# Patient Record
Sex: FEMALE | Race: WHITE | NOT HISPANIC OR LATINO | Employment: OTHER | URBAN - NONMETROPOLITAN AREA
[De-identification: names, ages, dates, MRNs, and addresses within clinical notes are randomized per-mention and may not be internally consistent; named-entity substitution may affect disease eponyms.]

---

## 2024-01-28 ENCOUNTER — HOSPITAL ENCOUNTER (EMERGENCY)
Facility: HOSPITAL | Age: 89
Discharge: HOME | End: 2024-01-28
Attending: EMERGENCY MEDICINE
Payer: MEDICARE

## 2024-01-28 ENCOUNTER — APPOINTMENT (OUTPATIENT)
Dept: RADIOLOGY | Facility: HOSPITAL | Age: 89
End: 2024-01-28
Payer: MEDICARE

## 2024-01-28 VITALS
DIASTOLIC BLOOD PRESSURE: 64 MMHG | WEIGHT: 176.37 LBS | BODY MASS INDEX: 34.63 KG/M2 | HEART RATE: 78 BPM | HEIGHT: 60 IN | RESPIRATION RATE: 18 BRPM | OXYGEN SATURATION: 100 % | TEMPERATURE: 96.6 F | SYSTOLIC BLOOD PRESSURE: 121 MMHG

## 2024-01-28 DIAGNOSIS — K52.9 COLITIS: Primary | ICD-10-CM

## 2024-01-28 DIAGNOSIS — N39.0 URINARY TRACT INFECTION WITHOUT HEMATURIA, SITE UNSPECIFIED: ICD-10-CM

## 2024-01-28 LAB
ALBUMIN SERPL BCP-MCNC: 3.9 G/DL (ref 3.4–5)
ALP SERPL-CCNC: 64 U/L (ref 33–136)
ALT SERPL W P-5'-P-CCNC: 8 U/L (ref 7–45)
ANION GAP SERPL CALC-SCNC: 12 MMOL/L (ref 10–20)
APPEARANCE UR: ABNORMAL
AST SERPL W P-5'-P-CCNC: 17 U/L (ref 9–39)
BACTERIA #/AREA URNS AUTO: ABNORMAL /HPF
BASOPHILS # BLD AUTO: 0.08 X10*3/UL (ref 0–0.1)
BASOPHILS NFR BLD AUTO: 1.2 %
BILIRUB SERPL-MCNC: 0.8 MG/DL (ref 0–1.2)
BILIRUB UR STRIP.AUTO-MCNC: NEGATIVE MG/DL
BUN SERPL-MCNC: 20 MG/DL (ref 6–23)
CALCIUM SERPL-MCNC: 10 MG/DL (ref 8.6–10.3)
CHLORIDE SERPL-SCNC: 102 MMOL/L (ref 98–107)
CO2 SERPL-SCNC: 28 MMOL/L (ref 21–32)
COLOR UR: YELLOW
CREAT SERPL-MCNC: 1.11 MG/DL (ref 0.5–1.05)
EGFRCR SERPLBLD CKD-EPI 2021: 48 ML/MIN/1.73M*2
EOSINOPHIL # BLD AUTO: 0.26 X10*3/UL (ref 0–0.4)
EOSINOPHIL NFR BLD AUTO: 3.8 %
ERYTHROCYTE [DISTWIDTH] IN BLOOD BY AUTOMATED COUNT: 14 % (ref 11.5–14.5)
FLUAV RNA RESP QL NAA+PROBE: NOT DETECTED
FLUBV RNA RESP QL NAA+PROBE: NOT DETECTED
GLUCOSE SERPL-MCNC: 153 MG/DL (ref 74–99)
GLUCOSE UR STRIP.AUTO-MCNC: NEGATIVE MG/DL
HCT VFR BLD AUTO: 34.4 % (ref 36–46)
HEMOCCULT SP1 STL QL: POSITIVE
HGB BLD-MCNC: 11.1 G/DL (ref 12–16)
HOLD SPECIMEN: NORMAL
IMM GRANULOCYTES # BLD AUTO: 0.03 X10*3/UL (ref 0–0.5)
IMM GRANULOCYTES NFR BLD AUTO: 0.4 % (ref 0–0.9)
KETONES UR STRIP.AUTO-MCNC: NEGATIVE MG/DL
LACTATE SERPL-SCNC: 1.9 MMOL/L (ref 0.4–2)
LEUKOCYTE ESTERASE UR QL STRIP.AUTO: ABNORMAL
LIPASE SERPL-CCNC: 25 U/L (ref 9–82)
LYMPHOCYTES # BLD AUTO: 1.44 X10*3/UL (ref 0.8–3)
LYMPHOCYTES NFR BLD AUTO: 20.8 %
MCH RBC QN AUTO: 29.8 PG (ref 26–34)
MCHC RBC AUTO-ENTMCNC: 32.3 G/DL (ref 32–36)
MCV RBC AUTO: 93 FL (ref 80–100)
MONOCYTES # BLD AUTO: 0.52 X10*3/UL (ref 0.05–0.8)
MONOCYTES NFR BLD AUTO: 7.5 %
NEUTROPHILS # BLD AUTO: 4.58 X10*3/UL (ref 1.6–5.5)
NEUTROPHILS NFR BLD AUTO: 66.3 %
NITRITE UR QL STRIP.AUTO: POSITIVE
NRBC BLD-RTO: 0 /100 WBCS (ref 0–0)
PH UR STRIP.AUTO: 5 [PH]
PLATELET # BLD AUTO: 260 X10*3/UL (ref 150–450)
POTASSIUM SERPL-SCNC: 3.9 MMOL/L (ref 3.5–5.3)
PROT SERPL-MCNC: 6.8 G/DL (ref 6.4–8.2)
PROT UR STRIP.AUTO-MCNC: NEGATIVE MG/DL
RBC # BLD AUTO: 3.72 X10*6/UL (ref 4–5.2)
RBC # UR STRIP.AUTO: ABNORMAL /UL
RBC #/AREA URNS AUTO: ABNORMAL /HPF
SARS-COV-2 RNA RESP QL NAA+PROBE: NOT DETECTED
SARS-COV-2 RNA RESP QL NAA+PROBE: NOT DETECTED
SODIUM SERPL-SCNC: 138 MMOL/L (ref 136–145)
SP GR UR STRIP.AUTO: 1.01
TRANS CELLS #/AREA UR COMP ASSIST: ABNORMAL /HPF
UROBILINOGEN UR STRIP.AUTO-MCNC: <2 MG/DL
WBC # BLD AUTO: 6.9 X10*3/UL (ref 4.4–11.3)
WBC #/AREA URNS AUTO: >50 /HPF

## 2024-01-28 PROCEDURE — 81001 URINALYSIS AUTO W/SCOPE: CPT | Performed by: EMERGENCY MEDICINE

## 2024-01-28 PROCEDURE — 83690 ASSAY OF LIPASE: CPT | Performed by: EMERGENCY MEDICINE

## 2024-01-28 PROCEDURE — 99284 EMERGENCY DEPT VISIT MOD MDM: CPT | Mod: 25

## 2024-01-28 PROCEDURE — 96361 HYDRATE IV INFUSION ADD-ON: CPT

## 2024-01-28 PROCEDURE — 82270 OCCULT BLOOD FECES: CPT | Performed by: EMERGENCY MEDICINE

## 2024-01-28 PROCEDURE — 96365 THER/PROPH/DIAG IV INF INIT: CPT

## 2024-01-28 PROCEDURE — 36415 COLL VENOUS BLD VENIPUNCTURE: CPT | Performed by: EMERGENCY MEDICINE

## 2024-01-28 PROCEDURE — 80053 COMPREHEN METABOLIC PANEL: CPT | Performed by: EMERGENCY MEDICINE

## 2024-01-28 PROCEDURE — 87086 URINE CULTURE/COLONY COUNT: CPT | Mod: CONLAB | Performed by: EMERGENCY MEDICINE

## 2024-01-28 PROCEDURE — 87637 SARSCOV2&INF A&B&RSV AMP PRB: CPT | Performed by: EMERGENCY MEDICINE

## 2024-01-28 PROCEDURE — 83605 ASSAY OF LACTIC ACID: CPT | Performed by: EMERGENCY MEDICINE

## 2024-01-28 PROCEDURE — 87506 IADNA-DNA/RNA PROBE TQ 6-11: CPT | Mod: CONLAB | Performed by: EMERGENCY MEDICINE

## 2024-01-28 PROCEDURE — 2500000004 HC RX 250 GENERAL PHARMACY W/ HCPCS (ALT 636 FOR OP/ED): Performed by: EMERGENCY MEDICINE

## 2024-01-28 PROCEDURE — 87493 C DIFF AMPLIFIED PROBE: CPT | Mod: 59 | Performed by: EMERGENCY MEDICINE

## 2024-01-28 PROCEDURE — 74176 CT ABD & PELVIS W/O CONTRAST: CPT

## 2024-01-28 PROCEDURE — 99285 EMERGENCY DEPT VISIT HI MDM: CPT | Performed by: EMERGENCY MEDICINE

## 2024-01-28 PROCEDURE — 74176 CT ABD & PELVIS W/O CONTRAST: CPT | Performed by: RADIOLOGY

## 2024-01-28 PROCEDURE — 85025 COMPLETE CBC W/AUTO DIFF WBC: CPT | Performed by: EMERGENCY MEDICINE

## 2024-01-28 RX ORDER — HYDROCHLOROTHIAZIDE 25 MG/1
25 TABLET ORAL DAILY
COMMUNITY

## 2024-01-28 RX ORDER — CIPROFLOXACIN 2 MG/ML
400 INJECTION, SOLUTION INTRAVENOUS ONCE
Status: COMPLETED | OUTPATIENT
Start: 2024-01-28 | End: 2024-01-28

## 2024-01-28 RX ORDER — CIPROFLOXACIN 500 MG/1
250 TABLET ORAL 2 TIMES DAILY
Qty: 10 TABLET | Refills: 0 | Status: SHIPPED | OUTPATIENT
Start: 2024-01-28 | End: 2024-02-07

## 2024-01-28 RX ORDER — SODIUM CHLORIDE 9 MG/ML
100 INJECTION, SOLUTION INTRAVENOUS CONTINUOUS
Status: DISCONTINUED | OUTPATIENT
Start: 2024-01-28 | End: 2024-01-28 | Stop reason: HOSPADM

## 2024-01-28 RX ADMIN — CIPROFLOXACIN 400 MG: 2 INJECTION, SOLUTION INTRAVENOUS at 14:13

## 2024-01-28 RX ADMIN — SODIUM CHLORIDE 100 ML/HR: 9 INJECTION, SOLUTION INTRAVENOUS at 11:47

## 2024-01-28 RX ADMIN — SODIUM CHLORIDE 500 ML: 9 INJECTION, SOLUTION INTRAVENOUS at 11:49

## 2024-01-28 ASSESSMENT — COLUMBIA-SUICIDE SEVERITY RATING SCALE - C-SSRS
6. HAVE YOU EVER DONE ANYTHING, STARTED TO DO ANYTHING, OR PREPARED TO DO ANYTHING TO END YOUR LIFE?: NO
1. IN THE PAST MONTH, HAVE YOU WISHED YOU WERE DEAD OR WISHED YOU COULD GO TO SLEEP AND NOT WAKE UP?: NO
2. HAVE YOU ACTUALLY HAD ANY THOUGHTS OF KILLING YOURSELF?: NO

## 2024-01-28 ASSESSMENT — PAIN - FUNCTIONAL ASSESSMENT: PAIN_FUNCTIONAL_ASSESSMENT: 0-10

## 2024-01-28 ASSESSMENT — PAIN SCALES - GENERAL: PAINLEVEL_OUTOF10: 0 - NO PAIN

## 2024-01-28 NOTE — ED PROVIDER NOTES
[facility]  ED  Provider Note  1/28/2024 10:19 AM  AC06/AC06        History of Present Illness:   Vanessa Adame is a 88 y.o. female presenting to the ED for hypotension and diarrhea, beginning 3 days ago.  The complaint has been intermittent, moderate in severity, and worsened by coughing.  Patient was transferred from the [facility] for low blood pressure of 60 systolic.  On arrival to ED her blood pressure is 129/64.  The patient reports she been having diarrhea 2 or 3 times a day for the past 4 days or so.  She denies any significant abdominal pain but has some mild cramping.  She denies any blood in the stool.  She has no episode earlier last year and had hypokalemia with dehydration.  She feels somewhat lightheaded but denies true vertigo or spinning.  She denies any fever or chills.  She denies any change in medication or diet.      Review of Systems:   Pertinent positives and review of systems as noted above.  Remaining 10 review of systems is negative or noncontributory to today's episode of care.  Review of Systems       --------------------------------------------- PAST HISTORY ---------------------------------------------  Past Medical History:  has no past medical history on file.    Past Surgical History:  has no past surgical history on file.    Social History:      Family History: family history is not on file. Unless otherwise noted, family history is non contributory    Patient's Medications    No medications on file      The patient’s home medications have been reviewed.    Allergies: Patient has no allergy information on record.    -------------------------------------------------- RESULTS -------------------------------------------------  All laboratory and radiology results have been personally reviewed by myself   LABS:  Labs Reviewed   OCCULT BLOOD X1, STOOL - Abnormal       Result Value    Occult Blood, Stool X1 Positive (*)    COMPREHENSIVE METABOLIC  PANEL - Abnormal    Glucose 153 (*)     Sodium 138      Potassium 3.9      Chloride 102      Bicarbonate 28      Anion Gap 12      Urea Nitrogen 20      Creatinine 1.11 (*)     eGFR 48 (*)     Calcium 10.0      Albumin 3.9      Alkaline Phosphatase 64      Total Protein 6.8      AST 17      Bilirubin, Total 0.8      ALT 8     CBC WITH AUTO DIFFERENTIAL - Abnormal    WBC 6.9      nRBC 0.0      RBC 3.72 (*)     Hemoglobin 11.1 (*)     Hematocrit 34.4 (*)     MCV 93      MCH 29.8      MCHC 32.3      RDW 14.0      Platelets 260      Neutrophils % 66.3      Immature Granulocytes %, Automated 0.4      Lymphocytes % 20.8      Monocytes % 7.5      Eosinophils % 3.8      Basophils % 1.2      Neutrophils Absolute 4.58      Immature Granulocytes Absolute, Automated 0.03      Lymphocytes Absolute 1.44      Monocytes Absolute 0.52      Eosinophils Absolute 0.26      Basophils Absolute 0.08     URINALYSIS WITH REFLEX CULTURE AND MICROSCOPIC - Abnormal    Color, Urine Yellow      Appearance, Urine Hazy (*)     Specific Gravity, Urine 1.006      pH, Urine 5.0      Protein, Urine NEGATIVE      Glucose, Urine NEGATIVE      Blood, Urine SMALL (1+) (*)     Ketones, Urine NEGATIVE      Bilirubin, Urine NEGATIVE      Urobilinogen, Urine <2.0      Nitrite, Urine POSITIVE (*)     Leukocyte Esterase, Urine LARGE (3+) (*)    MICROSCOPIC ONLY, URINE - Abnormal    WBC, Urine >50 (*)     RBC, Urine 6-10 (*)     Transitional Epithelial Cells, Urine 1-2 (FEW)      Bacteria, Urine 1+ (*)    LACTATE - Normal    Lactate 1.9      Narrative:     Venipuncture immediately after or during the administration of Metamizole may lead to falsely low results. Testing should be performed immediately  prior to Metamizole dosing.   LIPASE - Normal    Lipase 25      Narrative:     Venipuncture immediately after or during the administration of Metamizole may lead to falsely low results. Testing should be performed immediately prior to Metamizole dosing.    SARS-COV-2 PCR, SCREEN ASYMPTOMATIC - Normal    Coronavirus 2019, PCR Not Detected      Narrative:     This assay has received FDA Emergency Use Authorization (EUA) and is only authorized for the duration of time that circumstances exist to justify the authorization of the emergency use of in vitro diagnostic tests for the detection of SARS-CoV-2 virus and/or diagnosis of COVID-19 infection under section 564(b)(1) of the Act, 21 U.S.C. 360bbb-3(b)(1). This assay is an in vitro diagnostic nucleic acid amplification test for the qualitative detection of SARS-CoV-2 from nasopharyngeal specimens and has been validated for use at [facility]. Negative results do not preclude COVID-19 infections and should not be used as the sole basis for diagnosis, treatment, or other management decisions.     SARS-COV-2 AND INFLUENZA A/B PCR - Normal    Flu A Result Not Detected      Flu B Result Not Detected      Coronavirus 2019, PCR Not Detected      Narrative:     This assay has received FDA Emergency Use Authorization (EUA) and  is only authorized for the duration of time that circumstances exist to justify the authorization of the emergency use of in vitro diagnostic tests for the detection of SARS-CoV-2 virus and/or diagnosis of COVID-19 infection under section 564(b)(1) of the Act, 21 U.S.C. 360bbb-3(b)(1). Testing for SARS-CoV-2 is only recommended for patients who meet current clinical and/or epidemiological criteria as defined by federal, state, or local public health directives. This assay is an in vitro diagnostic nucleic acid amplification test for the qualitative detection of SARS-CoV-2, Influenza A, and Influenza B from nasopharyngeal specimens and has been validated for use at [facility]. Negative results do not preclude COVID-19 infections or Influenza A/B infections, and should not be used as the sole basis for diagnosis, treatment, or other management decisions. If  Influenza A/B and RSV PCR results are negative, testing for Parainfluenza virus, Adenovirus and Metapneumovirus is routinely performed for [facility] pediatric oncology and intensive care inpatients, and is available on other patients by placing an add-on request.    URINE CULTURE   STOOL PATHOGEN PANEL, PCR   C. DIFFICILE, PCR   URINE CULTURE   URINALYSIS WITH REFLEX CULTURE AND MICROSCOPIC    Narrative:     The following orders were created for panel order Urinalysis with Reflex Culture and Microscopic.  Procedure                               Abnormality         Status                     ---------                               -----------         ------                     Urinalysis with Reflex C...[[id number]]  Abnormal            Final result               Extra Urine Gray Tube[[id number]]                                                         Please view results for these tests on the individual orders.   EXTRA URINE GRAY TUBE         RADIOLOGY:  Interpreted by Radiologist.  CT abdomen pelvis wo IV contrast   Final Result   Mild-to-moderate colonic wall thickening and trace pericolonic fat   stranding within the left colon that may reflect an element of   colitis, likely infectious/inflammatory in etiology. There is no   evidence of bowel obstruction. Colonic diverticulosis is evident   within this region without definite CT evidence of focal acute   diverticulitis.        Nonobstructing 3 mm left renal calculus.        Similar appearing presumed hepatic cysts. Additional chronic findings   as above.        MACRO:   None        Signed by: Roger Euceda 1/28/2024 12:24 PM   Dictation workstation:   [id number]          No results found for this or any previous visit (from the past 4464 hour(s)).  ------------------------- NURSING NOTES AND VITALS REVIEWED ---------------------------   The nursing notes within the ED encounter and vital signs as below have been reviewed.   There were no vitals taken for this  visit.  Oxygen Saturation Interpretation: Normal      ---------------------------------------------------PHYSICAL EXAM--------------------------------------  Physical Exam   Constitutional/General: Alert and oriented x3, well appearing, non toxic in NAD  Head: Normocephalic and atraumatic  Eyes: PERRL, EOMI, conjunctiva normal, sclera non icteric  Mouth: Oropharynx clear, handling secretions, no trismus, no asymmetry of the posterior oropharynx or uvular edema  Neck: Supple, full ROM, non tender to palpation in the midline, no stridor, no crepitus, no meningeal signs  Respiratory: Lungs clear to auscultation bilaterally, no wheezes, rales, or rhonchi. Not in respiratory distress  Cardiovascular:  Regular rate. Regular rhythm. No murmurs, gallops, or rubs. 2+ distal pulses  Chest: No chest wall tenderness  GI:  Abdomen Soft, Non tender, Non distended.  +BS. No organomegaly, no palpable masses,  No rebound, guarding, or rigidity.   Musculoskeletal: Moves all extremities x 4. Warm and well perfused, no clubbing, cyanosis, or edema. Capillary refill <3 seconds  Integument: skin warm and dry. No rashes.   Lymphatic: no lymphadenopathy noted  Neurologic: GCS 15, no focal deficits, symmetric strength 5/5 in the upper and lower extremities bilaterally  Psychiatric: Normal Affect    Procedures    ------------------------------ ED COURSE/MEDICAL DECISION MAKING----------------------    Medical Decision Making:   Patient has some subtle evidence of possible colitis in the left colon on CT scan.  She has no fever or chills.  Her white blood cell count is normal.  There is no evidence of ischemia.  She does have a significant urinary tract infection.  Will place the patient on Cipro to cover the possible colitis as well as the urinary tract infection.  She is to follow-up with her primary care doctor in 2 to 3 days for recheck.  I advised return to the ER for worsening symptoms or concerns.  Diagnoses as of 01/28/24 1556    Colitis   Urinary tract infection without hematuria, site unspecified      Counseling:   The emergency provider has spoken with the patient and family member patient and daughter and discussed today’s results, in addition to providing specific details for the plan of care and counseling regarding the diagnosis and prognosis.  Questions are answered at this time and they are agreeable with the plan.      --------------------------------- IMPRESSION AND DISPOSITION ---------------------------------        IMPRESSION  1. Colitis    2. Urinary tract infection without hematuria, site unspecified        DISPOSITION  Disposition: Discharge to nursing home  Patient condition is fair      Billing Provider Critical Care Time: 0 minutes     Emeterio Luna MD  01/28/24 1553       Emeterio Luna MD  01/28/24 1554       Emeterio Luna MD  01/28/24 1555

## 2024-01-28 NOTE — DISCHARGE INSTRUCTIONS
Ciprofloxacin as prescribed twice per day.    Follow-up with your doctor on Thursday or Friday for recheck.    Return for worsening symptoms or concerns.      Continue regular home medications.

## 2024-01-29 LAB

## 2024-01-30 LAB
BACTERIA UR CULT: ABNORMAL
BACTERIA UR CULT: NORMAL
C DIF TOX TCDA+TCDB STL QL NAA+PROBE: NOT DETECTED

## 2024-01-31 ENCOUNTER — TELEPHONE (OUTPATIENT)
Dept: PHARMACY | Facility: HOSPITAL | Age: 89
End: 2024-01-31
Payer: MEDICARE

## 2024-01-31 NOTE — PROGRESS NOTES
EDPD Note: Lab/Chart Reviewed    Reviewed Ms. Vanessa Adame 's chart regarding a contaminated urine culture/result that was taken during their recent emergency room visit. The patient was transferred back to their rehab/LTC facility .Therefore, I have faxed this information to [facility] at fax number [phone] .    No results found for the last 90 days.      No further follow up needed from EDPD Team.     Renan Vega, PharmD

## 2024-09-06 ENCOUNTER — APPOINTMENT (OUTPATIENT)
Dept: RADIOLOGY | Facility: HOSPITAL | Age: 89
End: 2024-09-06
Payer: MEDICARE

## 2024-09-06 ENCOUNTER — APPOINTMENT (OUTPATIENT)
Dept: CARDIOLOGY | Facility: HOSPITAL | Age: 89
End: 2024-09-06
Payer: MEDICARE

## 2024-09-06 ENCOUNTER — HOSPITAL ENCOUNTER (INPATIENT)
Facility: HOSPITAL | Age: 89
LOS: 1 days | Discharge: HOME | End: 2024-09-07
Attending: FAMILY MEDICINE | Admitting: INTERNAL MEDICINE
Payer: MEDICARE

## 2024-09-06 DIAGNOSIS — U07.1 COVID: Primary | ICD-10-CM

## 2024-09-06 DIAGNOSIS — R53.83 LETHARGY: ICD-10-CM

## 2024-09-06 DIAGNOSIS — K52.9 COLITIS: ICD-10-CM

## 2024-09-06 DIAGNOSIS — U07.1 COVID-19 VIRUS INFECTION: ICD-10-CM

## 2024-09-06 LAB
ALBUMIN SERPL BCP-MCNC: 3.8 G/DL (ref 3.4–5)
ALP SERPL-CCNC: 62 U/L (ref 33–136)
ALT SERPL W P-5'-P-CCNC: 10 U/L (ref 7–45)
ANION GAP SERPL CALC-SCNC: 11 MMOL/L (ref 10–20)
APPEARANCE UR: CLEAR
AST SERPL W P-5'-P-CCNC: 20 U/L (ref 9–39)
BASOPHILS # BLD AUTO: 0.03 X10*3/UL (ref 0–0.1)
BASOPHILS NFR BLD AUTO: 0.6 %
BILIRUB SERPL-MCNC: 0.5 MG/DL (ref 0–1.2)
BILIRUB UR STRIP.AUTO-MCNC: NEGATIVE MG/DL
BNP SERPL-MCNC: 73 PG/ML (ref 0–99)
BUN SERPL-MCNC: 20 MG/DL (ref 6–23)
CALCIUM SERPL-MCNC: 9 MG/DL (ref 8.6–10.3)
CARDIAC TROPONIN I PNL SERPL HS: 9 NG/L (ref 0–13)
CHLORIDE SERPL-SCNC: 101 MMOL/L (ref 98–107)
CO2 SERPL-SCNC: 25 MMOL/L (ref 21–32)
COLOR UR: ABNORMAL
CREAT SERPL-MCNC: 0.9 MG/DL (ref 0.5–1.05)
EGFRCR SERPLBLD CKD-EPI 2021: 62 ML/MIN/1.73M*2
EOSINOPHIL # BLD AUTO: 0.1 X10*3/UL (ref 0–0.4)
EOSINOPHIL NFR BLD AUTO: 2.1 %
ERYTHROCYTE [DISTWIDTH] IN BLOOD BY AUTOMATED COUNT: 13.2 % (ref 11.5–14.5)
GLUCOSE SERPL-MCNC: 120 MG/DL (ref 74–99)
GLUCOSE UR STRIP.AUTO-MCNC: NEGATIVE MG/DL
HCT VFR BLD AUTO: 32.1 % (ref 36–46)
HGB BLD-MCNC: 10.6 G/DL (ref 12–16)
HOLD SPECIMEN: NORMAL
IMM GRANULOCYTES # BLD AUTO: 0 X10*3/UL (ref 0–0.5)
IMM GRANULOCYTES NFR BLD AUTO: 0 % (ref 0–0.9)
KETONES UR STRIP.AUTO-MCNC: NEGATIVE MG/DL
LACTATE SERPL-SCNC: 1.1 MMOL/L (ref 0.4–2)
LEUKOCYTE ESTERASE UR QL STRIP.AUTO: NEGATIVE
LYMPHOCYTES # BLD AUTO: 2.13 X10*3/UL (ref 0.8–3)
LYMPHOCYTES NFR BLD AUTO: 43.8 %
MAGNESIUM SERPL-MCNC: 1.78 MG/DL (ref 1.6–2.4)
MCH RBC QN AUTO: 29.9 PG (ref 26–34)
MCHC RBC AUTO-ENTMCNC: 33 G/DL (ref 32–36)
MCV RBC AUTO: 90 FL (ref 80–100)
MONOCYTES # BLD AUTO: 0.45 X10*3/UL (ref 0.05–0.8)
MONOCYTES NFR BLD AUTO: 9.3 %
NEUTROPHILS # BLD AUTO: 2.15 X10*3/UL (ref 1.6–5.5)
NEUTROPHILS NFR BLD AUTO: 44.2 %
NITRITE UR QL STRIP.AUTO: NEGATIVE
NRBC BLD-RTO: 0 /100 WBCS (ref 0–0)
PH UR STRIP.AUTO: 5 [PH]
PLATELET # BLD AUTO: 193 X10*3/UL (ref 150–450)
POTASSIUM SERPL-SCNC: 3.6 MMOL/L (ref 3.5–5.3)
PROT SERPL-MCNC: 6.6 G/DL (ref 6.4–8.2)
PROT UR STRIP.AUTO-MCNC: NEGATIVE MG/DL
RBC # BLD AUTO: 3.55 X10*6/UL (ref 4–5.2)
RBC # UR STRIP.AUTO: NEGATIVE /UL
SARS-COV-2 RNA RESP QL NAA+PROBE: DETECTED
SODIUM SERPL-SCNC: 133 MMOL/L (ref 136–145)
SP GR UR STRIP.AUTO: 1
UROBILINOGEN UR STRIP.AUTO-MCNC: <2 MG/DL
WBC # BLD AUTO: 4.9 X10*3/UL (ref 4.4–11.3)

## 2024-09-06 PROCEDURE — 84484 ASSAY OF TROPONIN QUANT: CPT | Performed by: FAMILY MEDICINE

## 2024-09-06 PROCEDURE — 96361 HYDRATE IV INFUSION ADD-ON: CPT

## 2024-09-06 PROCEDURE — 70450 CT HEAD/BRAIN W/O DYE: CPT

## 2024-09-06 PROCEDURE — 36415 COLL VENOUS BLD VENIPUNCTURE: CPT | Performed by: FAMILY MEDICINE

## 2024-09-06 PROCEDURE — 2500000002 HC RX 250 W HCPCS SELF ADMINISTERED DRUGS (ALT 637 FOR MEDICARE OP, ALT 636 FOR OP/ED): Mod: IPSPLIT | Performed by: NURSE PRACTITIONER

## 2024-09-06 PROCEDURE — 83605 ASSAY OF LACTIC ACID: CPT | Performed by: FAMILY MEDICINE

## 2024-09-06 PROCEDURE — 93005 ELECTROCARDIOGRAM TRACING: CPT

## 2024-09-06 PROCEDURE — 83735 ASSAY OF MAGNESIUM: CPT | Performed by: FAMILY MEDICINE

## 2024-09-06 PROCEDURE — 85025 COMPLETE CBC W/AUTO DIFF WBC: CPT | Performed by: FAMILY MEDICINE

## 2024-09-06 PROCEDURE — 2500000004 HC RX 250 GENERAL PHARMACY W/ HCPCS (ALT 636 FOR OP/ED): Mod: IPSPLIT | Performed by: NURSE PRACTITIONER

## 2024-09-06 PROCEDURE — 94760 N-INVAS EAR/PLS OXIMETRY 1: CPT | Mod: IPSPLIT

## 2024-09-06 PROCEDURE — 81003 URINALYSIS AUTO W/O SCOPE: CPT | Performed by: FAMILY MEDICINE

## 2024-09-06 PROCEDURE — 83880 ASSAY OF NATRIURETIC PEPTIDE: CPT | Performed by: FAMILY MEDICINE

## 2024-09-06 PROCEDURE — 71045 X-RAY EXAM CHEST 1 VIEW: CPT | Mod: FOREIGN READ | Performed by: RADIOLOGY

## 2024-09-06 PROCEDURE — 80053 COMPREHEN METABOLIC PANEL: CPT | Performed by: FAMILY MEDICINE

## 2024-09-06 PROCEDURE — 2500000001 HC RX 250 WO HCPCS SELF ADMINISTERED DRUGS (ALT 637 FOR MEDICARE OP): Mod: IPSPLIT | Performed by: NURSE PRACTITIONER

## 2024-09-06 PROCEDURE — 87635 SARS-COV-2 COVID-19 AMP PRB: CPT | Performed by: FAMILY MEDICINE

## 2024-09-06 PROCEDURE — 1210000001 HC SEMI-PRIVATE ROOM DAILY: Mod: IPSPLIT

## 2024-09-06 PROCEDURE — 96360 HYDRATION IV INFUSION INIT: CPT

## 2024-09-06 PROCEDURE — 71045 X-RAY EXAM CHEST 1 VIEW: CPT

## 2024-09-06 PROCEDURE — 2500000004 HC RX 250 GENERAL PHARMACY W/ HCPCS (ALT 636 FOR OP/ED): Performed by: FAMILY MEDICINE

## 2024-09-06 PROCEDURE — 93970 EXTREMITY STUDY: CPT

## 2024-09-06 PROCEDURE — 3E0DX3Z INTRODUCTION OF ANTI-INFLAMMATORY INTO MOUTH AND PHARYNX, EXTERNAL APPROACH: ICD-10-PCS | Performed by: INTERNAL MEDICINE

## 2024-09-06 PROCEDURE — 2500000004 HC RX 250 GENERAL PHARMACY W/ HCPCS (ALT 636 FOR OP/ED): Mod: IPSPLIT

## 2024-09-06 PROCEDURE — 70450 CT HEAD/BRAIN W/O DYE: CPT | Performed by: RADIOLOGY

## 2024-09-06 PROCEDURE — 99285 EMERGENCY DEPT VISIT HI MDM: CPT | Mod: 25

## 2024-09-06 PROCEDURE — 93971 EXTREMITY STUDY: CPT | Mod: FOREIGN READ | Performed by: RADIOLOGY

## 2024-09-06 RX ORDER — FUROSEMIDE 40 MG/1
40 TABLET ORAL DAILY
COMMUNITY

## 2024-09-06 RX ORDER — METOPROLOL TARTRATE 25 MG/1
25 TABLET, FILM COATED ORAL 2 TIMES DAILY
COMMUNITY

## 2024-09-06 RX ORDER — DEXAMETHASONE 4 MG/1
6 TABLET ORAL DAILY
Status: DISCONTINUED | OUTPATIENT
Start: 2024-09-07 | End: 2024-09-07 | Stop reason: HOSPADM

## 2024-09-06 RX ORDER — SODIUM CHLORIDE 9 MG/ML
INJECTION, SOLUTION INTRAVENOUS
Status: COMPLETED
Start: 2024-09-06 | End: 2024-09-06

## 2024-09-06 RX ORDER — CETIRIZINE HYDROCHLORIDE 10 MG/1
10 TABLET ORAL DAILY
Status: DISCONTINUED | OUTPATIENT
Start: 2024-09-06 | End: 2024-09-07 | Stop reason: HOSPADM

## 2024-09-06 RX ORDER — DEXAMETHASONE 4 MG/1
6 TABLET ORAL ONCE
Status: COMPLETED | OUTPATIENT
Start: 2024-09-06 | End: 2024-09-06

## 2024-09-06 RX ORDER — ENALAPRIL MALEATE 20 MG/1
20 TABLET ORAL DAILY
COMMUNITY

## 2024-09-06 RX ORDER — PANTOPRAZOLE SODIUM 40 MG/10ML
40 INJECTION, POWDER, LYOPHILIZED, FOR SOLUTION INTRAVENOUS
Status: DISCONTINUED | OUTPATIENT
Start: 2024-09-07 | End: 2024-09-07

## 2024-09-06 RX ORDER — OXYBUTYNIN CHLORIDE 5 MG/1
5 TABLET ORAL 2 TIMES DAILY
COMMUNITY

## 2024-09-06 RX ORDER — ONDANSETRON 4 MG/1
4 TABLET, ORALLY DISINTEGRATING ORAL EVERY 8 HOURS PRN
Status: DISCONTINUED | OUTPATIENT
Start: 2024-09-06 | End: 2024-09-07 | Stop reason: HOSPADM

## 2024-09-06 RX ORDER — LOPERAMIDE HCL 2 MG
2 TABLET ORAL AS NEEDED
COMMUNITY

## 2024-09-06 RX ORDER — ENALAPRIL MALEATE 5 MG/1
20 TABLET ORAL DAILY
Status: DISCONTINUED | OUTPATIENT
Start: 2024-09-06 | End: 2024-09-07 | Stop reason: HOSPADM

## 2024-09-06 RX ORDER — AZITHROMYCIN 250 MG/1
500 TABLET, FILM COATED ORAL
Status: DISCONTINUED | OUTPATIENT
Start: 2024-09-06 | End: 2024-09-07 | Stop reason: HOSPADM

## 2024-09-06 RX ORDER — POTASSIUM CHLORIDE 20 MEQ/1
20 TABLET, EXTENDED RELEASE ORAL 2 TIMES DAILY
Status: DISCONTINUED | OUTPATIENT
Start: 2024-09-06 | End: 2024-09-07 | Stop reason: HOSPADM

## 2024-09-06 RX ORDER — METOPROLOL TARTRATE 25 MG/1
25 TABLET, FILM COATED ORAL 2 TIMES DAILY
Status: DISCONTINUED | OUTPATIENT
Start: 2024-09-06 | End: 2024-09-07 | Stop reason: HOSPADM

## 2024-09-06 RX ORDER — FLUTICASONE PROPIONATE 50 MCG
1 SPRAY, SUSPENSION (ML) NASAL DAILY PRN
COMMUNITY

## 2024-09-06 RX ORDER — IBUPROFEN 200 MG
200 TABLET ORAL DAILY PRN
COMMUNITY

## 2024-09-06 RX ORDER — LANOLIN ALCOHOL/MO/W.PET/CERES
50 CREAM (GRAM) TOPICAL 2 TIMES DAILY
COMMUNITY

## 2024-09-06 RX ORDER — POLYETHYLENE GLYCOL 3350 17 G/17G
17 POWDER, FOR SOLUTION ORAL DAILY
Status: DISCONTINUED | OUTPATIENT
Start: 2024-09-06 | End: 2024-09-07 | Stop reason: HOSPADM

## 2024-09-06 RX ORDER — ACETAMINOPHEN 325 MG/1
650 TABLET ORAL EVERY 6 HOURS PRN
Status: DISCONTINUED | OUTPATIENT
Start: 2024-09-06 | End: 2024-09-07 | Stop reason: HOSPADM

## 2024-09-06 RX ORDER — ONDANSETRON HYDROCHLORIDE 2 MG/ML
4 INJECTION, SOLUTION INTRAVENOUS EVERY 8 HOURS PRN
Status: DISCONTINUED | OUTPATIENT
Start: 2024-09-06 | End: 2024-09-07 | Stop reason: HOSPADM

## 2024-09-06 RX ORDER — POTASSIUM CHLORIDE 20 MEQ/1
20 TABLET, EXTENDED RELEASE ORAL 2 TIMES DAILY
COMMUNITY

## 2024-09-06 RX ORDER — ACETAMINOPHEN 325 MG/1
650 TABLET ORAL EVERY 6 HOURS PRN
COMMUNITY

## 2024-09-06 RX ORDER — LORATADINE 10 MG/1
10 TABLET ORAL DAILY
COMMUNITY

## 2024-09-06 RX ORDER — ENOXAPARIN SODIUM 100 MG/ML
40 INJECTION SUBCUTANEOUS EVERY 24 HOURS
Status: DISCONTINUED | OUTPATIENT
Start: 2024-09-06 | End: 2024-09-07 | Stop reason: HOSPADM

## 2024-09-06 RX ORDER — SODIUM CHLORIDE 9 MG/ML
125 INJECTION, SOLUTION INTRAVENOUS CONTINUOUS
Status: DISCONTINUED | OUTPATIENT
Start: 2024-09-06 | End: 2024-09-07 | Stop reason: HOSPADM

## 2024-09-06 RX ORDER — FLUTICASONE PROPIONATE 50 MCG
1 SPRAY, SUSPENSION (ML) NASAL DAILY PRN
Status: DISCONTINUED | OUTPATIENT
Start: 2024-09-06 | End: 2024-09-07 | Stop reason: HOSPADM

## 2024-09-06 RX ORDER — PANTOPRAZOLE SODIUM 40 MG/1
40 TABLET, DELAYED RELEASE ORAL
Status: DISCONTINUED | OUTPATIENT
Start: 2024-09-07 | End: 2024-09-07 | Stop reason: HOSPADM

## 2024-09-06 SDOH — ECONOMIC STABILITY: HOUSING INSECURITY: AT ANY TIME IN THE PAST 12 MONTHS, WERE YOU HOMELESS OR LIVING IN A SHELTER (INCLUDING NOW)?: NO

## 2024-09-06 SDOH — ECONOMIC STABILITY: TRANSPORTATION INSECURITY
IN THE PAST 12 MONTHS, HAS THE LACK OF TRANSPORTATION KEPT YOU FROM MEDICAL APPOINTMENTS OR FROM GETTING MEDICATIONS?: NO

## 2024-09-06 SDOH — SOCIAL STABILITY: SOCIAL INSECURITY: DO YOU FEEL ANYONE HAS EXPLOITED OR TAKEN ADVANTAGE OF YOU FINANCIALLY OR OF YOUR PERSONAL PROPERTY?: NO

## 2024-09-06 SDOH — SOCIAL STABILITY: SOCIAL INSECURITY: ARE YOU OR HAVE YOU BEEN THREATENED OR ABUSED PHYSICALLY, EMOTIONALLY, OR SEXUALLY BY ANYONE?: NO

## 2024-09-06 SDOH — ECONOMIC STABILITY: INCOME INSECURITY: HOW HARD IS IT FOR YOU TO PAY FOR THE VERY BASICS LIKE FOOD, HOUSING, MEDICAL CARE, AND HEATING?: NOT HARD AT ALL

## 2024-09-06 SDOH — SOCIAL STABILITY: SOCIAL INSECURITY: DO YOU FEEL UNSAFE GOING BACK TO THE PLACE WHERE YOU ARE LIVING?: NO

## 2024-09-06 SDOH — SOCIAL STABILITY: SOCIAL INSECURITY: DOES ANYONE TRY TO KEEP YOU FROM HAVING/CONTACTING OTHER FRIENDS OR DOING THINGS OUTSIDE YOUR HOME?: NO

## 2024-09-06 SDOH — SOCIAL STABILITY: SOCIAL INSECURITY: ARE THERE ANY APPARENT SIGNS OF INJURIES/BEHAVIORS THAT COULD BE RELATED TO ABUSE/NEGLECT?: NO

## 2024-09-06 SDOH — ECONOMIC STABILITY: TRANSPORTATION INSECURITY
IN THE PAST 12 MONTHS, HAS LACK OF TRANSPORTATION KEPT YOU FROM MEETINGS, WORK, OR FROM GETTING THINGS NEEDED FOR DAILY LIVING?: NO

## 2024-09-06 SDOH — ECONOMIC STABILITY: INCOME INSECURITY: IN THE LAST 12 MONTHS, WAS THERE A TIME WHEN YOU WERE NOT ABLE TO PAY THE MORTGAGE OR RENT ON TIME?: NO

## 2024-09-06 SDOH — SOCIAL STABILITY: SOCIAL INSECURITY: WERE YOU ABLE TO COMPLETE ALL THE BEHAVIORAL HEALTH SCREENINGS?: YES

## 2024-09-06 SDOH — SOCIAL STABILITY: SOCIAL INSECURITY: ABUSE: ADULT

## 2024-09-06 SDOH — SOCIAL STABILITY: SOCIAL INSECURITY: HAS ANYONE EVER THREATENED TO HURT YOUR FAMILY OR YOUR PETS?: NO

## 2024-09-06 SDOH — SOCIAL STABILITY: SOCIAL INSECURITY: HAVE YOU HAD THOUGHTS OF HARMING ANYONE ELSE?: NO

## 2024-09-06 SDOH — ECONOMIC STABILITY: HOUSING INSECURITY: IN THE PAST 12 MONTHS, HOW MANY TIMES HAVE YOU MOVED WHERE YOU WERE LIVING?: 1

## 2024-09-06 ASSESSMENT — COGNITIVE AND FUNCTIONAL STATUS - GENERAL
DAILY ACTIVITIY SCORE: 23
STANDING UP FROM CHAIR USING ARMS: A LITTLE
WALKING IN HOSPITAL ROOM: A LITTLE
MOVING TO AND FROM BED TO CHAIR: A LITTLE
TURNING FROM BACK TO SIDE WHILE IN FLAT BAD: A LITTLE
TURNING FROM BACK TO SIDE WHILE IN FLAT BAD: A LITTLE
CLIMB 3 TO 5 STEPS WITH RAILING: A LOT
STANDING UP FROM CHAIR USING ARMS: A LITTLE
CLIMB 3 TO 5 STEPS WITH RAILING: A LOT
MOVING TO AND FROM BED TO CHAIR: A LITTLE
DAILY ACTIVITIY SCORE: 23
WALKING IN HOSPITAL ROOM: A LITTLE
PATIENT BASELINE BEDBOUND: NO
MOBILITY SCORE: 18
DRESSING REGULAR LOWER BODY CLOTHING: A LITTLE
DRESSING REGULAR LOWER BODY CLOTHING: A LITTLE
MOBILITY SCORE: 18

## 2024-09-06 ASSESSMENT — ACTIVITIES OF DAILY LIVING (ADL)
ASSISTIVE_DEVICE: OTHER (COMMENT)
WALKS IN HOME: NEEDS ASSISTANCE
PATIENT'S MEMORY ADEQUATE TO SAFELY COMPLETE DAILY ACTIVITIES?: YES
GROOMING: INDEPENDENT
HEARING - RIGHT EAR: FUNCTIONAL
JUDGMENT_ADEQUATE_SAFELY_COMPLETE_DAILY_ACTIVITIES: YES
ADEQUATE_TO_COMPLETE_ADL: YES
DRESSING YOURSELF: INDEPENDENT
BATHING: INDEPENDENT
TOILETING: NEEDS ASSISTANCE
HEARING - LEFT EAR: FUNCTIONAL
FEEDING YOURSELF: INDEPENDENT

## 2024-09-06 ASSESSMENT — PATIENT HEALTH QUESTIONNAIRE - PHQ9
SUM OF ALL RESPONSES TO PHQ9 QUESTIONS 1 & 2: 0
1. LITTLE INTEREST OR PLEASURE IN DOING THINGS: NOT AT ALL
2. FEELING DOWN, DEPRESSED OR HOPELESS: NOT AT ALL

## 2024-09-06 ASSESSMENT — LIFESTYLE VARIABLES
SKIP TO QUESTIONS 9-10: 1
HOW OFTEN DO YOU HAVE 6 OR MORE DRINKS ON ONE OCCASION: NEVER
HOW OFTEN DO YOU HAVE A DRINK CONTAINING ALCOHOL: NEVER
AUDIT-C TOTAL SCORE: 0
HOW MANY STANDARD DRINKS CONTAINING ALCOHOL DO YOU HAVE ON A TYPICAL DAY: PATIENT DOES NOT DRINK
AUDIT-C TOTAL SCORE: 0

## 2024-09-06 ASSESSMENT — PAIN SCALES - GENERAL
PAINLEVEL_OUTOF10: 0 - NO PAIN
PAINLEVEL_OUTOF10: 0 - NO PAIN

## 2024-09-06 ASSESSMENT — PAIN - FUNCTIONAL ASSESSMENT
PAIN_FUNCTIONAL_ASSESSMENT: 0-10

## 2024-09-06 ASSESSMENT — COLUMBIA-SUICIDE SEVERITY RATING SCALE - C-SSRS
1. IN THE PAST MONTH, HAVE YOU WISHED YOU WERE DEAD OR WISHED YOU COULD GO TO SLEEP AND NOT WAKE UP?: NO
2. HAVE YOU ACTUALLY HAD ANY THOUGHTS OF KILLING YOURSELF?: NO
6. HAVE YOU EVER DONE ANYTHING, STARTED TO DO ANYTHING, OR PREPARED TO DO ANYTHING TO END YOUR LIFE?: NO

## 2024-09-06 NOTE — PROGRESS NOTES
09/06/24 1542   Discharge Planning   Living Arrangements Other (Comment)   Support Systems Other (Comment)   Assistance Needed mod I suing rollator   Type of Residence Assisted living   Care Facility Name [facility]   Expected Discharge Disposition Home     [facility] the [facility] and uses a Rolator for ambulation safety. She is here with Covid and will need to return in isolation to the [facility]. HERIBERTO Luna

## 2024-09-06 NOTE — ED PROVIDER NOTES
HPI   Chief Complaint   Patient presents with    Weakness, Gen       HPI  This 88-year-old female patient brought into the emergency room with a complaint of weakness, patient got up to use the bathroom and stated that she was really unable to walk as result she was brought to the ER for evaluation however she denies any difficulty moving arms or legs any chest pain or short of breath any arm or leg drift or numbness ting arms or legs.  Denies any palpitation rapid or slow heartbeat blacked out or pass out.  Denies trauma fall or injury.  Also denies any fever or chills nausea vomiting or any diarrhea currently however she had diarrhea about 2 days ago 2 loose bowel movement without any blood in stool or black stool.  Denies UTI symptoms.  Denies patient admitted with sinus congestion and stuffy nose.  Denies any chest pain or short of breath.    Family history: Reviewed  Social history: Reviewed, denies substance abuse.  Review of system: 10 review of system obtained review of system as In HPI otherwise negative.    Patient History   No past medical history on file.  No past surgical history on file.  No family history on file.  Social History     Tobacco Use    Smoking status: Never    Smokeless tobacco: Never   Substance Use Topics    Alcohol use: Not on file    Drug use: Not on file   The EKG obtained 8 825 hours showed sinus rhythm first-degree AV block, left axis deviation rate of 72.  Left bundle branch block.  Abnormal EKG.  Left bundle kristen block first-degree AV block also cited on prior EKG since November 7, 2021.  No new changes.  Abnormal EKG.  I read this EKG.    Second EKG done at 939 hours: Sinus rhythm with rate of 65, first-degree AV block, left axis deviation, left bundle branch block.  No ST-T wave elevation left bundle kristen block also started on multiple EKGs since then number 7/2021.  No new changes noted.  Abnormal EKG.  I read this EKG.        Physical Exam   ED Triage Vitals [09/06/24  "0749]   Temperature Heart Rate Respirations BP   36.8 °C (98.2 °F) 74 20 133/85      SpO2 Temp Source Heart Rate Source Patient Position   100 % Temporal -- Lying      BP Location FiO2 (%)     Right arm --       Physical Exam  Constitutional:       Appearance: Normal appearance.      Comments: Elderly female patient was breathing without acute distress.  Somewhat anxious when asked about any problem but no tachypnea hypoxemia respiratory symptoms noted awake alert oriented x 3 no facial drooping or drooling no stridor breathing comfortably.  Throat was clear intact no ear nose discharge noted neck is supple   HENT:      Head: Normocephalic and atraumatic.      Right Ear: External ear normal.      Left Ear: External ear normal.      Nose: Nose normal.      Mouth/Throat:      Mouth: Mucous membranes are moist.   Eyes:      Extraocular Movements: Extraocular movements intact.      Conjunctiva/sclera: Conjunctivae normal.      Pupils: Pupils are equal, round, and reactive to light.   Neck:      Vascular: No carotid bruit.   Cardiovascular:      Rate and Rhythm: Normal rate and regular rhythm.      Pulses: Normal pulses.      Heart sounds: Normal heart sounds. No murmur heard.     No friction rub. No gallop.      Comments: Regular rate and rhythm no friction rub no murmur no JVD \"peripheral pulses noted some tenderness in the left upper calf but no palpable venous cords Homans' sign negative she also complained of right leg pain but there was no tenderness in palpation with palpation of right lower extremity.  Intact DP PT pulses good capillary refill negative for toes intact sensation.  Pulmonary:      Effort: Pulmonary effort is normal. No respiratory distress.      Breath sounds: Normal breath sounds. No stridor. No rales.      Comments: Clear breath sound bilaterally without wheezing rales or rhonchi no tachypnea hypoxemia restlessness noted good skin perfusion left calf slightly tender as described in cardiovascular " examination.  No JVD good peripheral pulses good skin perfusion intact distal pulse intact sensation no cyanosis or hypoxemia.  No tachypnea.  Abdominal:      General: Abdomen is flat. Bowel sounds are normal.      Palpations: Abdomen is soft.      Comments: Careful examination abdomen revealed abdomen soft positive bowel sound nontender no guarding, rebound or rigidity.  No CVA tenderness noted.   Musculoskeletal:         General: No swelling, tenderness, deformity or signs of injury. Normal range of motion.      Cervical back: Normal range of motion and neck supple. No rigidity or tenderness.      Right lower leg: No edema.      Left lower leg: No edema.   Lymphadenopathy:      Cervical: No cervical adenopathy.   Skin:     Comments: No rash or bruising or petechiae ecchymosis.  Normal skin turgor and perfusion.   Neurological:      General: No focal deficit present.      Mental Status: She is alert and oriented to person, place, and time.      Cranial Nerves: No cranial nerve deficit.      Sensory: No sensory deficit.      Motor: No weakness.      Coordination: Coordination normal.      Gait: Gait normal.      Deep Tendon Reflexes: Reflexes normal.      Comments: No facial drooping or drooling no arms or leg drift noted symmetrical strength and range of motion both upper extremity strength about 5 out of 5.  Cerebellar examination grossly within normal range.  Cranial nerve II to XII grossly intact.  Neck is supple.  Spine nontender.  Talking breathing comfortably.   Psychiatric:         Mood and Affect: Mood normal.         Behavior: Behavior normal.           ED Course & MDM   Diagnoses as of 09/11/24 1010   COVID   COVID-19 virus infection   Lethargy     This 88-year-old female patient brought into the emergency room with a complaint of weakness, patient got up to use the bathroom and stated that she was really unable to walk as result she was brought to the ER for evaluation however she denies any difficulty  moving arms or legs any chest pain or short of breath any arm or leg drift or numbness ting arms or legs.  Denies any palpitation rapid or slow heartbeat blacked out or pass out.  Denies trauma fall or injury.  Also denies any fever or chills nausea vomiting or any diarrhea currently however she had diarrhea about 2 days ago 2 loose bowel movement without any blood in stool or black stool.  Denies UTI symptoms.  Denies patient admitted with sinus congestion and stuffy nose.  Denies any chest pain or short of breath.    Upon examination she was awake and alert no facial drooping or drooling upper airway congestion noted throat without edema x-ray discharge intact neck supple lungs clear heart regular rate and rhythm respiratory abdomen soft nontender mild tenderness in the left upper calf area without any palpable venous cords Homans' sign negative bilaterally intact distal pulse intact sensation grimace arms or legs neck is supple no recent adjustments neuro examination within normal 8 no motor or sensory deficit no facial drooping or drooling no stridor no nuchal rigidity.    Due to patient advanced age complaining of generalized weakness some diarrhea couple days ago but no diarrhea for last 2 days no abdominal pain I still ordered normal saline 125 cc/h until we get BMP report back order EKG and complete labs including cardiac enzymes and urinalysis.  I also ordered ultrasound lower extremities chest x-ray urinalysis and COVID-19 test.  CT of the head was also ordered.            No data recorded     Reinaldo Coma Scale Score: 15 (09/06/24 0753 : Yoav Villeda RN)                           Medical Decision Making    EKG showed no ST-T evaluation.  Urinalysis was negative.  Troponin was negative lactate level is 1.1.  White count of 4.9 and H&H is 10 and 32 differential normal chemistry grossly unremarkable left sodium 133 glucose 120 patient magnesium was normal however patient found to have positive COVID-19  test.  CT of the head was negative for acute intracranial abnormality ultrasound showed no DVT and chest x-ray showed no acute cardiopulmonary process.  However due to patient presenting symptoms I recommended hospitalization and patient admitted for further care.  Patient was admitted due to lethargy and COVID-19 infection with  Procedure  Procedures     Shannon Young MD  09/06/24 0817       Shannon Young MD  09/06/24 0838       Shannon Young MD  09/06/24 1004       Shannon Young MD  09/11/24 1012

## 2024-09-06 NOTE — CARE PLAN
The patient's goals for the shift include      The clinical goals for the shift include Patient will use call bell throughout the shift for assistance    Patient was admitted this shift, stated no pain. Patient is tolerating IV fluids. VSS this shift. Patient received home morning medications this afternoon. Patient was a one person assist with a walker to the bedside commode. Patient requested that her daughter to not visit while in the hospital. Patient had a good appetite this shift. Call bell is within reach, no new requests at this time.

## 2024-09-07 VITALS
RESPIRATION RATE: 18 BRPM | BODY MASS INDEX: 32.2 KG/M2 | DIASTOLIC BLOOD PRESSURE: 58 MMHG | HEIGHT: 60 IN | OXYGEN SATURATION: 96 % | HEART RATE: 57 BPM | TEMPERATURE: 97.6 F | SYSTOLIC BLOOD PRESSURE: 127 MMHG | WEIGHT: 164.02 LBS

## 2024-09-07 LAB
ATRIAL RATE: 65 BPM
ATRIAL RATE: 72 BPM
P AXIS: 65 DEGREES
P AXIS: 65 DEGREES
P OFFSET: 134 MS
P OFFSET: 156 MS
P ONSET: 94 MS
P ONSET: 95 MS
PR INTERVAL: 234 MS
PR INTERVAL: 236 MS
Q ONSET: 211 MS
Q ONSET: 213 MS
QRS COUNT: 11 BEATS
QRS COUNT: 12 BEATS
QRS DURATION: 168 MS
QRS DURATION: 170 MS
QT INTERVAL: 444 MS
QT INTERVAL: 464 MS
QTC CALCULATION(BAZETT): 482 MS
QTC CALCULATION(BAZETT): 486 MS
QTC FREDERICIA: 472 MS
QTC FREDERICIA: 476 MS
R AXIS: -62 DEGREES
R AXIS: -63 DEGREES
T AXIS: 87 DEGREES
T AXIS: 92 DEGREES
T OFFSET: 433 MS
T OFFSET: 445 MS
VENTRICULAR RATE: 65 BPM
VENTRICULAR RATE: 72 BPM

## 2024-09-07 PROCEDURE — 2500000004 HC RX 250 GENERAL PHARMACY W/ HCPCS (ALT 636 FOR OP/ED): Mod: IPSPLIT | Performed by: NURSE PRACTITIONER

## 2024-09-07 PROCEDURE — 2500000004 HC RX 250 GENERAL PHARMACY W/ HCPCS (ALT 636 FOR OP/ED): Mod: IPSPLIT

## 2024-09-07 PROCEDURE — 2500000001 HC RX 250 WO HCPCS SELF ADMINISTERED DRUGS (ALT 637 FOR MEDICARE OP): Mod: IPSPLIT | Performed by: NURSE PRACTITIONER

## 2024-09-07 PROCEDURE — 2500000002 HC RX 250 W HCPCS SELF ADMINISTERED DRUGS (ALT 637 FOR MEDICARE OP, ALT 636 FOR OP/ED): Mod: IPSPLIT | Performed by: NURSE PRACTITIONER

## 2024-09-07 PROCEDURE — 94760 N-INVAS EAR/PLS OXIMETRY 1: CPT | Mod: IPSPLIT

## 2024-09-07 RX ORDER — DEXAMETHASONE 6 MG/1
6 TABLET ORAL DAILY
Qty: 8 TABLET | Refills: 0 | Status: SHIPPED | OUTPATIENT
Start: 2024-09-08 | End: 2024-09-16

## 2024-09-07 RX ORDER — PANTOPRAZOLE SODIUM 40 MG/1
40 TABLET, DELAYED RELEASE ORAL
Qty: 90 TABLET | Refills: 3 | Status: SHIPPED | OUTPATIENT
Start: 2024-09-08

## 2024-09-07 RX ORDER — AZITHROMYCIN 500 MG/1
500 TABLET, FILM COATED ORAL
Qty: 2 TABLET | Refills: 0 | Status: SHIPPED | OUTPATIENT
Start: 2024-09-08 | End: 2024-09-10

## 2024-09-07 RX ORDER — SODIUM CHLORIDE 9 MG/ML
INJECTION, SOLUTION INTRAVENOUS
Status: COMPLETED
Start: 2024-09-07 | End: 2024-09-07

## 2024-09-07 ASSESSMENT — COGNITIVE AND FUNCTIONAL STATUS - GENERAL
TURNING FROM BACK TO SIDE WHILE IN FLAT BAD: A LITTLE
DAILY ACTIVITIY SCORE: 23
MOVING TO AND FROM BED TO CHAIR: A LITTLE
MOBILITY SCORE: 18
DRESSING REGULAR LOWER BODY CLOTHING: A LITTLE
CLIMB 3 TO 5 STEPS WITH RAILING: A LOT
STANDING UP FROM CHAIR USING ARMS: A LITTLE
WALKING IN HOSPITAL ROOM: A LITTLE

## 2024-09-07 NOTE — CARE PLAN
The patient's goals for the shift include  discharge back to assisted living.    The clinical goals for the shift include Pt will use call bell for assistance throughout the shift    Over the shift, the patient did not make progress toward the following goals. Barriers to progression include none. Recommendations to address these barriers include follow care plan and medication.

## 2024-09-07 NOTE — DISCHARGE SUMMARY
Discharge Diagnosis  COVID-19 virus infection    Issues Requiring Follow-Up  None    Discharge Meds     Medication List      START taking these medications     azithromycin 500 mg tablet; Commonly known as: Zithromax; Take 1 tablet   (500 mg) by mouth once every 24 hours for 2 doses.; Start taking on:   September 8, 2024   dexAMETHasone 6 mg tablet; Commonly known as: Decadron; Take 1 tablet (6   mg) by mouth once daily for 8 doses.; Start taking on: September 8, 2024   pantoprazole 40 mg EC tablet; Commonly known as: ProtoNix; Take 1 tablet   (40 mg) by mouth once daily in the morning. Take before meals. Do not   crush, chew, or split.; Start taking on: September 8, 2024     CONTINUE taking these medications     acetaminophen 325 mg tablet; Commonly known as: Tylenol   enalapril 20 mg tablet; Commonly known as: Vasotec   fluticasone 50 mcg/actuation nasal spray; Commonly known as: Flonase   furosemide 40 mg tablet; Commonly known as: Lasix   ibuprofen 200 mg tablet   loperamide 2 mg tablet; Commonly known as: Imodium A-D   loratadine 10 mg tablet; Commonly known as: Claritin   metoprolol tartrate 25 mg tablet; Commonly known as: Lopressor   oxybutynin 5 mg tablet; Commonly known as: Ditropan   potassium chloride CR 20 mEq ER tablet; Commonly known as: Klor-Con M20   pyridoxine 50 mg tablet; Commonly known as: Vitamin B-6     STOP taking these medications     hydroCHLOROthiazide 25 mg tablet; Commonly known as: HYDRODiuril       Test Results Pending At Discharge  Pending Labs       Order Current Status    Gray Top Collected (09/06/24 0801)    Green Top Collected (09/06/24 0801)    Extra Tubes In process            Hospital Course    Vanessa Adame is a 88 y.o. female  with PMHx significant for HTN, CHF, hypokalemia, OAB, HLD, CKD II, spinal stenosis, OA, diverticulosis, falls, injury to LLE from fall 7/2023.  who presented to [facility] due to generalized weakness. She expressed that a couple of days ago she  was sneezing excessively and then felt that she would not be able to walk to the bathroom. She is found to have COVID on testing in the ED. Dexamethasone and azithromycin initiated.     Pertinent Physical Exam At Time of Discharge  Constitutional:       Appearance: She is obese. She is not ill-appearing.   HENT:      Head: Atraumatic.      Nose: Nose normal.      Mouth/Throat:      Mouth: Mucous membranes are moist.   Eyes:      Extraocular Movements: Extraocular movements intact.      Pupils: Pupils are equal, round, and reactive to light.   Cardiovascular:      Rate and Rhythm: Normal rate and regular rhythm.   Pulmonary:      Effort: Pulmonary effort is normal.      Breath sounds: Normal breath sounds.   Abdominal:      General: Bowel sounds are normal.      Palpations: Abdomen is soft.   Musculoskeletal:      Right lower leg: Edema present.      Left lower leg: Edema present.   Skin:     Capillary Refill: Capillary refill takes less than 2 seconds.   Neurological:      General: No focal deficit present.      Mental Status: She is alert and oriented to person, place, and time.   Psychiatric:         Mood and Affect: Mood normal.         Behavior: Behavior normal.     Outpatient Follow-Up  No future appointments.      Erin Leroy, APRN-CNP

## 2024-09-07 NOTE — H&P
History of Present Illness  Vanessa Adame is a 88 y.o. female  with PMHx significant for HTN, CHF, hypokalemia, OAB, HLD, CKD II, spinal stenosis, OA, diverticulosis, falls, injury to LLE from fall 7/2023.  who presented to [facility] due to generalized weakness. She expressed that a couple of days ago she was sneezing excessively and then felt that she would not be able to walk to the bathroom. She is found to have COVID on testing in the ED. Dexamethasone and azithromycin initiated.     12 Point ROS negative unless noted in above HPI     Past Medical History  Past Medical History:   Diagnosis Date    Anxiety     CHF (congestive heart failure) (Multi)     Chronic osteoarthritis     Chronic renal disease, stage III (Multi)     Dementia with behavioral problem (Multi)     Diabetes mellitus (Multi)     Diverticulosis of intestine without perforation or abscess without bleeding     History of fall     Hyperlipemia     Hypertension     Hypokalemia     Lack of coordination     Overactive bladder     Senile dementia, uncomplicated (Multi)     Spinal stenosis     Unsteady gait     Weakness generalized        Surgical History  Past Surgical History:   Procedure Laterality Date    TOTAL KNEE ARTHROPLASTY Left         Social History  She reports that she has never smoked. She has never been exposed to tobacco smoke. She has never used smokeless tobacco. She reports that she does not drink alcohol and does not use drugs.    Allergies  Penicillin and Sulfa (sulfonamide antibiotics)     Physical Exam  Constitutional:       Appearance: She is obese. She is not ill-appearing.   HENT:      Head: Atraumatic.      Nose: Nose normal.      Mouth/Throat:      Mouth: Mucous membranes are moist.   Eyes:      Extraocular Movements: Extraocular movements intact.      Pupils: Pupils are equal, round, and reactive to light.   Cardiovascular:      Rate and Rhythm: Normal rate and regular rhythm.   Pulmonary:      Effort: Pulmonary  "effort is normal.      Breath sounds: Normal breath sounds.   Abdominal:      General: Bowel sounds are normal.      Palpations: Abdomen is soft.   Musculoskeletal:      Right lower leg: Edema present.      Left lower leg: Edema present.   Skin:     Capillary Refill: Capillary refill takes less than 2 seconds.   Neurological:      General: No focal deficit present.      Mental Status: She is alert and oriented to person, place, and time.   Psychiatric:         Mood and Affect: Mood normal.         Behavior: Behavior normal.          Last Recorded Vitals  Blood pressure 127/58, pulse 57, temperature 36.4 °C (97.6 °F), temperature source Temporal, resp. rate 18, height 1.53 m (5' 0.24\"), weight 74.4 kg (164 lb 0.4 oz), SpO2 96%.    Relevant Results  Scheduled medications  azithromycin, 500 mg, oral, q24h PEREZ  cetirizine, 10 mg, oral, Daily  dexAMETHasone, 6 mg, oral, Daily  enalapril, 20 mg, oral, Daily  enoxaparin, 40 mg, subcutaneous, q24h  metoprolol tartrate, 25 mg, oral, BID  pantoprazole, 40 mg, oral, Daily before breakfast  polyethylene glycol, 17 g, oral, Daily  potassium chloride CR, 20 mEq, oral, BID      Continuous medications  sodium chloride 0.9%, 125 mL/hr, Last Rate: 125 mL/hr (09/07/24 0556)      PRN medications  PRN medications: acetaminophen, fluticasone, ondansetron ODT **OR** ondansetron     No results found for this or any previous visit (from the past 24 hour(s)).     Imaging  ECG 12 lead    Result Date: 9/7/2024  Sinus rhythm with 1st degree AV block Left axis deviation Left bundle branch block Abnormal ECG When compared with ECG of 06-SEP-2024 08:25, (unconfirmed) No significant change was found See ED provider note for full interpretation and clinical correlation Confirmed by Venus Colunga ([id number]) on 9/7/2024 1:25:30 PM    ECG 12 lead    Result Date: 9/7/2024  Sinus rhythm with 1st degree AV block Left axis deviation Left bundle branch block Abnormal ECG When compared with ECG of 13-AUG-2023 " 01:04, Previous ECG has undetermined rhythm, needs review QRS duration has increased T wave amplitude has decreased in Anterior leads See ED provider note for full interpretation and clinical correlation Confirmed by Venus Colunga ([id number]) on 9/7/2024 1:24:47 PM    Vascular US lower extremity venous duplex bilateral    Result Date: 9/6/2024  STUDY: Bilateral lower extremity venous ultrasound; Completed Time: 9/6/2024 10:19 INDICATION: BLE pain and weakness.  Covid positive. COMPARISON: None available. ACCESSION NUMBER(S): [id number] ORDERING CLINICIAN: LOUIS BOCANEGRA TECHNIQUE: Ultrasound of the bilateral lower extremity veins.  Multiple images were obtained. FINDINGS: The bilateral common femoral, femoral, and popliteal veins demonstrated normal compressibility, normal phasic venous flow, and normal response to augmentation.  There is no evidence for echogenic thrombi.  The visualized deep calf veins are patent.    No evidence for DVT within the lower extremities bilaterally.  Signed by Baltazar Mcconnell MD    CT head wo IV contrast    Result Date: 9/6/2024  Interpreted By:  Priti Myers, STUDY: CT HEAD WO IV CONTRAST;  9/6/2024 8:44 am   INDICATION: Signs/Symptoms:Generalized weakness.,  Could not walk to the bathroom.   COMPARISON: 11/07/2021   ACCESSION NUMBER(S): [id number]   ORDERING CLINICIAN: LOUIS BOCANEGRA   TECHNIQUE: Axial images of 5 mm thickness were obtained through the head without intravenous contrast sagittal and coronal reconstructions were acquired.   FINDINGS: BRAIN PARENCHYMA: Periventricular and subcortical white matter changes are present.  No masses are seen. No mass effect.  No acute cortical infarct or mass effect is seen.   HEMORRHAGE: There is no evidence for hemorrhage.   VENTRICLES and EXTRA-AXIAL SPACES: The ventricles, sulci and cisterns are prominent suggesting volume loss.   INTRACRANIAL VESSELS:  atherosclerotic calcification.   EXTRACRANIAL SOFT TISSUES: Within normal limits.    PARANASAL SINUSES/MASTOIDS: Mucosal thickening is seen in the left maxillary sinus and in ethmoid air cells   CALVARIUM: No destructive lesion or depressed skull fracture. Small left from osteoma is again seen.       Diffuse volume loss and periventricular white matter changes, which given patient's age likely represent small vessel ischemic disease.   No CT evidence of acute hemorrhage or acute cortical infarct.     MACRO: None   Signed by: Priti Myers 9/6/2024 8:54 AM Dictation workstation:   [id number]    XR chest 1 view    Result Date: 9/6/2024  STUDY: Chest Radiograph, One View; 9/6/2024, 0817 INDICATION: Weakness. COMPARISON: XR chest 11/7/2021, 6/15/2021 ACCESSION NUMBER(S): [id number] ORDERING CLINICIAN: LOUIS BOCANEGRA TECHNIQUE:  Frontal chest was obtained at 0817 hours. FINDINGS: CARDIOMEDIASTINAL SILHOUETTE: Cardiomediastinal silhouette is normal in size and configuration.  LUNGS: Lungs are clear.  ABDOMEN: No remarkable upper abdominal findings.  BONES: No acute osseous changes.    No acute findings. Signed by Gustavo Levi MD      Assessment/Plan   Vanessa Adame is a 88 y.o. female  with PMHx significant for HTN, CHF, hypokalemia, OAB, HLD, CKD II, spinal stenosis, OA, diverticulosis, falls, injury to LLE from fall 7/2023.  who presented to [facility] due to generalized weakness. She expressed that a couple of days ago she was sneezing excessively and then felt that she would not be able to walk to the bathroom. She is found to have COVID on testing in the ED. Dexamethasone and azithromycin initiated.     Disposition: Admitted 2/2 generalized weakness 2/2 COVID. Stable on room air. Plan for discharge in the morning if remains stable.     I spent 45 minutes in the professional and overall care of this patient.      Erin Leroy, APRN-CNP  Internal Medicine

## 2024-09-07 NOTE — CARE PLAN
The clinical goals for the shift include Pt will use call bell for assistance throughout the shift    Over the shift, the patient did make progress toward the following goals. Barriers to progression include COVID isolation. Pt resting quietly throughout the night. Continue NS @ 125ml/hr, tolerating IV fluids. VSS, RA. One assist to use bedside commode. Denies pain and no sign of distress. Call light within reach, no new requests at this time.

## 2024-10-03 ENCOUNTER — APPOINTMENT (OUTPATIENT)
Dept: RADIOLOGY | Facility: HOSPITAL | Age: 89
End: 2024-10-03
Payer: MEDICARE

## 2024-10-03 ENCOUNTER — HOSPITAL ENCOUNTER (EMERGENCY)
Facility: HOSPITAL | Age: 89
Discharge: HOME | End: 2024-10-04
Attending: EMERGENCY MEDICINE
Payer: MEDICARE

## 2024-10-03 DIAGNOSIS — M19.011 ARTHRITIS OF SHOULDER REGION, RIGHT: Primary | ICD-10-CM

## 2024-10-03 PROCEDURE — 2500000004 HC RX 250 GENERAL PHARMACY W/ HCPCS (ALT 636 FOR OP/ED): Performed by: EMERGENCY MEDICINE

## 2024-10-03 PROCEDURE — 73030 X-RAY EXAM OF SHOULDER: CPT | Mod: RIGHT SIDE | Performed by: STUDENT IN AN ORGANIZED HEALTH CARE EDUCATION/TRAINING PROGRAM

## 2024-10-03 PROCEDURE — 99284 EMERGENCY DEPT VISIT MOD MDM: CPT | Mod: 25

## 2024-10-03 PROCEDURE — 73030 X-RAY EXAM OF SHOULDER: CPT | Mod: RT

## 2024-10-03 PROCEDURE — 96374 THER/PROPH/DIAG INJ IV PUSH: CPT

## 2024-10-03 RX ORDER — KETOROLAC TROMETHAMINE 15 MG/ML
15 INJECTION, SOLUTION INTRAMUSCULAR; INTRAVENOUS ONCE
Status: COMPLETED | OUTPATIENT
Start: 2024-10-03 | End: 2024-10-03

## 2024-10-03 ASSESSMENT — PAIN DESCRIPTION - ORIENTATION: ORIENTATION: RIGHT

## 2024-10-03 ASSESSMENT — PAIN - FUNCTIONAL ASSESSMENT: PAIN_FUNCTIONAL_ASSESSMENT: 0-10

## 2024-10-03 ASSESSMENT — PAIN SCALES - GENERAL: PAINLEVEL_OUTOF10: 7

## 2024-10-03 ASSESSMENT — PAIN DESCRIPTION - LOCATION: LOCATION: ARM

## 2024-10-04 ENCOUNTER — APPOINTMENT (OUTPATIENT)
Dept: RADIOLOGY | Facility: HOSPITAL | Age: 89
End: 2024-10-04
Payer: MEDICARE

## 2024-10-04 ENCOUNTER — HOSPITAL ENCOUNTER (OUTPATIENT)
Dept: CARDIOLOGY | Facility: HOSPITAL | Age: 89
Discharge: HOME | End: 2024-10-04
Payer: MEDICARE

## 2024-10-04 VITALS
TEMPERATURE: 97.6 F | OXYGEN SATURATION: 97 % | HEART RATE: 88 BPM | BODY MASS INDEX: 32.2 KG/M2 | SYSTOLIC BLOOD PRESSURE: 122 MMHG | HEIGHT: 60 IN | DIASTOLIC BLOOD PRESSURE: 75 MMHG | WEIGHT: 164 LBS | RESPIRATION RATE: 17 BRPM

## 2024-10-04 PROCEDURE — 73200 CT UPPER EXTREMITY W/O DYE: CPT | Mod: RT

## 2024-10-04 PROCEDURE — 73200 CT UPPER EXTREMITY W/O DYE: CPT | Mod: RIGHT SIDE | Performed by: RADIOLOGY

## 2024-10-04 PROCEDURE — 2500000001 HC RX 250 WO HCPCS SELF ADMINISTERED DRUGS (ALT 637 FOR MEDICARE OP): Performed by: EMERGENCY MEDICINE

## 2024-10-04 PROCEDURE — 93005 ELECTROCARDIOGRAM TRACING: CPT

## 2024-10-04 RX ORDER — HYDROCODONE BITARTRATE AND ACETAMINOPHEN 5; 325 MG/1; MG/1
1 TABLET ORAL EVERY 6 HOURS PRN
Qty: 20 TABLET | Refills: 0 | Status: SHIPPED | OUTPATIENT
Start: 2024-10-04 | End: 2024-10-09

## 2024-10-04 RX ORDER — TRAMADOL HYDROCHLORIDE 50 MG/1
50 TABLET ORAL ONCE
Status: COMPLETED | OUTPATIENT
Start: 2024-10-04 | End: 2024-10-04

## 2024-10-04 ASSESSMENT — PAIN SCALES - GENERAL: PAINLEVEL_OUTOF10: 7

## 2024-10-04 NOTE — ED PROVIDER NOTES
HPI   Chief Complaint   Patient presents with    Arm Pain       Patient was brought in by one of the ambulance services from one of the local nursing homes.  She has had right shoulder pain for several months but is really getting worse in the last couple days.  Tylenol does not seem to be helping any.  She has not injured it recently with falls sneezes any convulsive actions.  Denies any numbness or weakness distally.  On exam the pain seems to be mostly in the right AC joint.            Patient History   Past Medical History:   Diagnosis Date    Anxiety     CHF (congestive heart failure)     Chronic osteoarthritis     Chronic renal disease, stage III (Multi)     Dementia with behavioral problem (Multi)     Diabetes mellitus (Multi)     Diverticulosis of intestine without perforation or abscess without bleeding     History of fall     Hyperlipemia     Hypertension     Hypokalemia     Lack of coordination     Overactive bladder     Senile dementia, uncomplicated (Multi)     Spinal stenosis     Unsteady gait     Weakness generalized      Past Surgical History:   Procedure Laterality Date    TOTAL KNEE ARTHROPLASTY Left      No family history on file.  Social History     Tobacco Use    Smoking status: Never     Passive exposure: Never    Smokeless tobacco: Never   Substance Use Topics    Alcohol use: Never    Drug use: Never       Physical Exam   ED Triage Vitals [10/03/24 2057]   Temperature Heart Rate Respirations BP   36.4 °C (97.6 °F) 91 15 144/62      SpO2 Temp src Heart Rate Source Patient Position   98 % -- -- --      BP Location FiO2 (%)     -- --       Physical Exam  Vitals and nursing note reviewed.   HENT:      Head: Normocephalic and atraumatic.      Right Ear: Tympanic membrane and ear canal normal.      Left Ear: Tympanic membrane and ear canal normal.      Nose: Nose normal.      Mouth/Throat:      Mouth: Mucous membranes are moist.   Cardiovascular:      Rate and Rhythm: Normal rate.   Pulmonary:       Effort: Pulmonary effort is normal.      Breath sounds: Normal breath sounds.   Abdominal:      General: Abdomen is flat.      Palpations: Abdomen is soft.   Musculoskeletal:         General: Tenderness present. Normal range of motion.      Cervical back: Normal range of motion.      Comments: There is some tenderness over the right AC joint and in the glenohumeral joint.  Limitation in range of motion due to pain.   Skin:     General: Skin is warm and dry.   Neurological:      General: No focal deficit present.      Mental Status: She is alert.           ED Course & MDM   Diagnoses as of 10/26/24 0023   Arthritis of shoulder region, right                 No data recorded     Young Harris Coma Scale Score: 15 (10/03/24 2115 : Natasha Estes, JOHN)                           Medical Decision Making  Plain films did not show much so we performed a CT of the right shoulder which shows severe glenohumeral as well as acromioclavicular arthritis.  No occult fracture was noted.  I try to find the patient something a bit stronger for her pain and but the illness is a burden on her primary care physician there is a nursing home to find her some orthopedic follow-up, especially since she does not want to go out toward [facility].      EKG interpreted by me: Sinus rhythm with rate of 90, with first-degree AV block.  Left bundle branch block present.        Procedure  Procedures     Gustavo Espana MD  10/04/24 0211       Gustavo Espana MD  10/26/24 0023

## 2024-10-05 LAB
ATRIAL RATE: 90 BPM
P AXIS: 77 DEGREES
P OFFSET: 169 MS
P ONSET: 99 MS
PR INTERVAL: 230 MS
Q ONSET: 214 MS
QRS COUNT: 15 BEATS
QRS DURATION: 164 MS
QT INTERVAL: 408 MS
QTC CALCULATION(BAZETT): 499 MS
QTC FREDERICIA: 467 MS
R AXIS: -61 DEGREES
T AXIS: 99 DEGREES
T OFFSET: 418 MS
VENTRICULAR RATE: 90 BPM

## 2024-11-19 ENCOUNTER — APPOINTMENT (OUTPATIENT)
Dept: RADIOLOGY | Facility: HOSPITAL | Age: 89
End: 2024-11-19
Payer: MEDICARE

## 2024-11-19 ENCOUNTER — HOSPITAL ENCOUNTER (EMERGENCY)
Facility: HOSPITAL | Age: 89
Discharge: HOME | End: 2024-11-19
Attending: EMERGENCY MEDICINE
Payer: MEDICARE

## 2024-11-19 VITALS
SYSTOLIC BLOOD PRESSURE: 159 MMHG | HEIGHT: 60 IN | OXYGEN SATURATION: 100 % | TEMPERATURE: 98.4 F | RESPIRATION RATE: 19 BRPM | BODY MASS INDEX: 32.07 KG/M2 | WEIGHT: 163.36 LBS | HEART RATE: 88 BPM | DIASTOLIC BLOOD PRESSURE: 64 MMHG

## 2024-11-19 DIAGNOSIS — S09.90XA CLOSED HEAD INJURY, INITIAL ENCOUNTER: ICD-10-CM

## 2024-11-19 DIAGNOSIS — S16.1XXA STRAIN OF NECK MUSCLE, INITIAL ENCOUNTER: ICD-10-CM

## 2024-11-19 DIAGNOSIS — S30.0XXA CONTUSION OF PELVIS, INITIAL ENCOUNTER: ICD-10-CM
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
## 2024-11-19 LAB
ANION GAP SERPL CALC-SCNC: 8 MMOL/L (ref 10–20)
BASOPHILS # BLD AUTO: 0.07 X10*3/UL (ref 0–0.1)
BASOPHILS NFR BLD AUTO: 1.1 %
BUN SERPL-MCNC: 27 MG/DL (ref 6–23)
CALCIUM SERPL-MCNC: 9.6 MG/DL (ref 8.6–10.3)
CHLORIDE SERPL-SCNC: 106 MMOL/L (ref 98–107)
CO2 SERPL-SCNC: 27 MMOL/L (ref 21–32)
CREAT SERPL-MCNC: 1.14 MG/DL (ref 0.5–1.05)
EGFRCR SERPLBLD CKD-EPI 2021: 46 ML/MIN/1.73M*2
EOSINOPHIL # BLD AUTO: 0.16 X10*3/UL (ref 0–0.4)
EOSINOPHIL NFR BLD AUTO: 2.4 %
ERYTHROCYTE [DISTWIDTH] IN BLOOD BY AUTOMATED COUNT: 13.8 % (ref 11.5–14.5)
GLUCOSE SERPL-MCNC: 108 MG/DL (ref 74–99)
HCT VFR BLD AUTO: 30.8 % (ref 36–46)
HGB BLD-MCNC: 10.2 G/DL (ref 12–16)
IMM GRANULOCYTES # BLD AUTO: 0.02 X10*3/UL (ref 0–0.5)
IMM GRANULOCYTES NFR BLD AUTO: 0.3 % (ref 0–0.9)
LYMPHOCYTES # BLD AUTO: 2.07 X10*3/UL (ref 0.8–3)
LYMPHOCYTES NFR BLD AUTO: 31.2 %
MCH RBC QN AUTO: 30.1 PG (ref 26–34)
MCHC RBC AUTO-ENTMCNC: 33.1 G/DL (ref 32–36)
MCV RBC AUTO: 91 FL (ref 80–100)
MONOCYTES # BLD AUTO: 0.51 X10*3/UL (ref 0.05–0.8)
MONOCYTES NFR BLD AUTO: 7.7 %
NEUTROPHILS # BLD AUTO: 3.8 X10*3/UL (ref 1.6–5.5)
NEUTROPHILS NFR BLD AUTO: 57.3 %
NRBC BLD-RTO: 0 /100 WBCS (ref 0–0)
PLATELET # BLD AUTO: 245 X10*3/UL (ref 150–450)
POTASSIUM SERPL-SCNC: 4.1 MMOL/L (ref 3.5–5.3)
RBC # BLD AUTO: 3.39 X10*6/UL (ref 4–5.2)
SODIUM SERPL-SCNC: 137 MMOL/L (ref 136–145)
WBC # BLD AUTO: 6.6 X10*3/UL (ref 4.4–11.3)

## 2024-11-19 PROCEDURE — 72125 CT NECK SPINE W/O DYE: CPT

## 2024-11-19 PROCEDURE — 70450 CT HEAD/BRAIN W/O DYE: CPT

## 2024-11-19 PROCEDURE — 36415 COLL VENOUS BLD VENIPUNCTURE: CPT | Performed by: EMERGENCY MEDICINE

## 2024-11-19 PROCEDURE — 70450 CT HEAD/BRAIN W/O DYE: CPT | Performed by: RADIOLOGY

## 2024-11-19 PROCEDURE — 99285 EMERGENCY DEPT VISIT HI MDM: CPT | Mod: 25

## 2024-11-19 PROCEDURE — 73521 X-RAY EXAM HIPS BI 2 VIEWS: CPT

## 2024-11-19 PROCEDURE — 80048 BASIC METABOLIC PNL TOTAL CA: CPT | Performed by: EMERGENCY MEDICINE

## 2024-11-19 PROCEDURE — 85025 COMPLETE CBC W/AUTO DIFF WBC: CPT | Performed by: EMERGENCY MEDICINE

## 2024-11-19 PROCEDURE — 73521 X-RAY EXAM HIPS BI 2 VIEWS: CPT | Mod: BILATERAL PROCEDURE | Performed by: RADIOLOGY

## 2024-11-19 PROCEDURE — 72125 CT NECK SPINE W/O DYE: CPT | Performed by: RADIOLOGY

## 2024-11-19 ASSESSMENT — COLUMBIA-SUICIDE SEVERITY RATING SCALE - C-SSRS
2. HAVE YOU ACTUALLY HAD ANY THOUGHTS OF KILLING YOURSELF?: NO
1. IN THE PAST MONTH, HAVE YOU WISHED YOU WERE DEAD OR WISHED YOU COULD GO TO SLEEP AND NOT WAKE UP?: NO
6. HAVE YOU EVER DONE ANYTHING, STARTED TO DO ANYTHING, OR PREPARED TO DO ANYTHING TO END YOUR LIFE?: NO

## 2024-11-19 ASSESSMENT — PAIN - FUNCTIONAL ASSESSMENT: PAIN_FUNCTIONAL_ASSESSMENT: 0-10

## 2024-11-19 NOTE — ED PROVIDER NOTES
[facility]   ED  Provider Note  11/19/2024 11:14 AM  AC03/AC03      Chief Complaint   Patient presents with    Fall        History of Present Illness:   Vanessa Adame is a 89 y.o. female presenting to the ED for a fall, beginning 1 hour prior to arrival.  The complaint has been persistent mild,  in severity, and worsened by movement.  [facility] this [facility].  She fell backwards and struck the back of her head.  She also complains of bilateral hip and posterior pelvic pain from landing on her backside.  She has mild pain in her right wrist.      Review of Systems:   Pertinent positives and review of systems as noted above.  Remaining 10 review of systems is negative or noncontributory to today's episode of care.  Review of Systems       --------------------------------------------- PAST HISTORY ---------------------------------------------  Past Medical History:   Past Medical History:   Diagnosis Date    Anxiety     CHF (congestive heart failure)     Chronic osteoarthritis     Chronic renal disease, stage III (Multi)     Dementia with behavioral problem (Multi)     Diabetes mellitus (Multi)     Diverticulosis of intestine without perforation or abscess without bleeding     History of fall     Hyperlipemia     Hypertension     Hypokalemia     Lack of coordination     Overactive bladder     Senile dementia, uncomplicated (Multi)     Spinal stenosis     Unsteady gait     Weakness generalized         Past Surgical History:   Past Surgical History:   Procedure Laterality Date    TOTAL KNEE ARTHROPLASTY Left         Social History:   Social History     Social History Narrative    Not on file        Family History: family history is not on file. Unless otherwise noted, family history is non contributory    Patient's Medications   New Prescriptions    No medications on file   Previous Medications    ACETAMINOPHEN (TYLENOL) 325 MG TABLET    Take 2 tablets (650 mg) by  mouth every 6 hours if needed for mild pain (1 - 3).    DEXAMETHASONE (DECADRON) 6 MG TABLET    Take 1 tablet (6 mg) by mouth once daily for 8 doses.    ENALAPRIL (VASOTEC) 20 MG TABLET    Take 1 tablet (20 mg) by mouth once daily.    FLUTICASONE (FLONASE) 50 MCG/ACTUATION NASAL SPRAY    Administer 1 spray into each nostril once daily as needed for rhinitis. Shake gently. Before first use, prime pump. After use, clean tip and replace cap.    FUROSEMIDE (LASIX) 40 MG TABLET    Take 1 tablet (40 mg) by mouth once daily.    IBUPROFEN 200 MG TABLET    Take 1 tablet (200 mg) by mouth once daily as needed for mild pain (1 - 3).    LOPERAMIDE (IMODIUM A-D) 2 MG TABLET    Take 1 tablet (2 mg) by mouth if needed for diarrhea.    LORATADINE (CLARITIN) 10 MG TABLET    Take 1 tablet (10 mg) by mouth once daily.    METOPROLOL TARTRATE (LOPRESSOR) 25 MG TABLET    Take 1 tablet (25 mg) by mouth 2 times a day.    OXYBUTYNIN (DITROPAN) 5 MG TABLET    Take 1 tablet (5 mg) by mouth 2 times a day.    PANTOPRAZOLE (PROTONIX) 40 MG EC TABLET    Take 1 tablet (40 mg) by mouth once daily in the morning. Take before meals. Do not crush, chew, or split.    POTASSIUM CHLORIDE CR 20 MEQ ER TABLET    Take 1 tablet (20 mEq) by mouth 2 times a day. Do not crush or chew.    PYRIDOXINE (VITAMIN B-6) 50 MG TABLET    Take 1 tablet (50 mg) by mouth 2 times a day.   Modified Medications    No medications on file   Discontinued Medications    No medications on file      The patient’s home medications have been reviewed.    Allergies: Amoxapine, Penicillin, and Sulfa (sulfonamide antibiotics)    -------------------------------------------------- RESULTS -------------------------------------------------  All laboratory and radiology results have been personally reviewed by myself   LABS:  Labs Reviewed   CBC WITH AUTO DIFFERENTIAL - Abnormal       Result Value    WBC 6.6      nRBC 0.0      RBC 3.39 (*)     Hemoglobin 10.2 (*)     Hematocrit 30.8 (*)      MCV 91      MCH 30.1      MCHC 33.1      RDW 13.8      Platelets 245      Neutrophils % 57.3      Immature Granulocytes %, Automated 0.3      Lymphocytes % 31.2      Monocytes % 7.7      Eosinophils % 2.4      Basophils % 1.1      Neutrophils Absolute 3.80      Immature Granulocytes Absolute, Automated 0.02      Lymphocytes Absolute 2.07      Monocytes Absolute 0.51      Eosinophils Absolute 0.16      Basophils Absolute 0.07     BASIC METABOLIC PANEL - Abnormal    Glucose 108 (*)     Sodium 137      Potassium 4.1      Chloride 106      Bicarbonate 27      Anion Gap 8 (*)     Urea Nitrogen 27 (*)     Creatinine 1.14 (*)     eGFR 46 (*)     Calcium 9.6           RADIOLOGY:  Interpreted by Radiologist.  CT cervical spine wo IV contrast   Final Result   Multilevel spondylosis without acute osseous abnormality of the   cervical spine.        MACRO:   None        Signed by: Juno Negrete 11/19/2024 12:18 PM   Dictation workstation:   [id number]      CT head wo IV contrast   Final Result   No CT evidence of acute intracranial injury.                  MACRO:   None             Signed by: Juno Negrete 11/19/2024 12:14 PM   Dictation workstation:   [id number]      XR hips bilateral 2 VW w pelvis when performed   Final Result   Mild osteoarthritis bilateral hips and sacroiliac joints        Signed by: Roney Montes 11/19/2024 12:06 PM   Dictation workstation:   [id number]      XR wrist right 3+ views    (Results Pending)       Encounter Date: 10/03/24   ECG 12 lead   Result Value    Ventricular Rate 90    Atrial Rate 90    AZ Interval 230    QRS Duration 164    QT Interval 408    QTC Calculation(Bazett) 499    P Axis 77    R Axis -61    T Axis 99    QRS Count 15    Q Onset 214    P Onset 99    P Offset 169    T Offset 418    QTC Fredericia 467    Narrative    Sinus rhythm with 1st degree AV block  Left axis deviation  Left bundle branch block  Abnormal ECG  When compared with ECG of 06-SEP-2024 09:39,  No significant  change was found  See ED provider note for full interpretation and clinical correlation  Confirmed by Venus Colunga ([id number]) on 10/5/2024 10:33:26 AM     ------------------------- NURSING NOTES AND VITALS REVIEWED ---------------------------   The nursing notes within the ED encounter and vital signs as below have been reviewed.   /64   Pulse 88   Temp 36.9 °C (98.4 °F) (Tympanic)   Resp 19   Ht 1.524 m (5')   Wt 74.1 kg (163 lb 5.8 oz)   SpO2 100%   BMI 31.90 kg/m²   Oxygen Saturation Interpretation: Normal      ---------------------------------------------------PHYSICAL EXAM--------------------------------------  Physical Exam   Constitutional/General: Alert,  well appearing, non toxic in NAD  Head: Normocephalic and atraumatic  Eyes: PERRL, EOMI, conjunctiva normal, sclera non icteric  Mouth: Oropharynx clear, handling secretions, no trismus, no asymmetry of the posterior oropharynx or uvular edema  Neck: Supple, full ROM, non tender to palpation in the midline, no stridor, no crepitus, no meningeal signs  Respiratory: Lungs clear to auscultation bilaterally, no wheezes, rales, or rhonchi. Not in respiratory distress  Cardiovascular:  Regular rate. Regular rhythm. No murmurs, gallops, or rubs. 2+ distal pulses  Chest: No chest wall tenderness  GI:  Abdomen Soft, Non tender, Non distended.  +BS. No organomegaly, no palpable masses,  No rebound, guarding, or rigidity.   Musculoskeletal: Moves all extremities x 4. Warm and well perfused, no clubbing, cyanosis, or edema. Capillary refill <3 seconds  Integument: skin warm and dry. No rashes.   Lymphatic: no lymphadenopathy noted  Neurologic: No focal deficits, symmetric strength 5/5 in the upper and lower extremities bilaterally  Psychiatric: Normal Affect    Procedures    ------------------------------ ED COURSE/MEDICAL DECISION MAKING----------------------  Diagnoses as of 11/19/24 1338   Fall, initial encounter   Closed head injury, initial encounter    Strain of neck muscle, initial encounter   Contusion of pelvis, initial encounter      Patient's x-rays show no significant pathology from today's fall.  Patient declines x-ray of her right wrist.  She states it is not that painful.  She is stable for outpatient management.      Medical Decision Making:   Discharged to nursing home  Diagnoses as of 11/19/24 1338   Fall, initial encounter   Closed head injury, initial encounter   Strain of neck muscle, initial encounter   Contusion of pelvis, initial encounter      Counseling:   The emergency provider has spoken with the patient and discussed today’s results, in addition to providing specific details for the plan of care and counseling regarding the diagnosis and prognosis.  Questions are answered at this time and they are agreeable with the plan.      --------------------------------- IMPRESSION AND DISPOSITION ---------------------------------        IMPRESSION  1. Fall, initial encounter    2. Closed head injury, initial encounter    3. Strain of neck muscle, initial encounter    4. Contusion of pelvis, initial encounter        DISPOSITION  Disposition: Discharge to nursing home  Patient condition is fair      Billing Provider Critical Care Time: 0 minutes     Emeterio Luna MD  11/21/24 2871

## 2024-11-19 NOTE — DISCHARGE INSTRUCTIONS
Tylenol for pain.    Follow-up with primary care doctor in 3 to 5 days for recheck.    Return for any worsening symptoms or concerns.    Use your walker or cane to prevent future falls is much much as possible.

## 2025-01-18 ENCOUNTER — APPOINTMENT (OUTPATIENT)
Dept: CARDIOLOGY | Facility: HOSPITAL | Age: 89
End: 2025-01-18
Payer: MEDICARE

## 2025-01-18 ENCOUNTER — APPOINTMENT (OUTPATIENT)
Dept: RADIOLOGY | Facility: HOSPITAL | Age: 89
End: 2025-01-18
Payer: MEDICARE

## 2025-01-18 ENCOUNTER — HOSPITAL ENCOUNTER (EMERGENCY)
Facility: HOSPITAL | Age: 89
Discharge: HOME | End: 2025-01-18
Attending: EMERGENCY MEDICINE
Payer: MEDICARE

## 2025-01-18 VITALS
HEIGHT: 60 IN | BODY MASS INDEX: 36.31 KG/M2 | DIASTOLIC BLOOD PRESSURE: 85 MMHG | OXYGEN SATURATION: 98 % | WEIGHT: 184.97 LBS | RESPIRATION RATE: 18 BRPM | TEMPERATURE: 98.4 F | HEART RATE: 101 BPM | SYSTOLIC BLOOD PRESSURE: 107 MMHG

## 2025-01-18 DIAGNOSIS — R11.2 NAUSEA AND VOMITING, UNSPECIFIED VOMITING TYPE: Primary | ICD-10-CM

## 2025-01-18 LAB
ALBUMIN SERPL BCP-MCNC: 4.4 G/DL (ref 3.4–5)
ALP SERPL-CCNC: 71 U/L (ref 33–136)
ALT SERPL W P-5'-P-CCNC: 11 U/L (ref 7–45)
ANION GAP SERPL CALC-SCNC: 16 MMOL/L (ref 10–20)
APPEARANCE UR: CLEAR
AST SERPL W P-5'-P-CCNC: 27 U/L (ref 9–39)
BASOPHILS # BLD AUTO: 0.07 X10*3/UL (ref 0–0.1)
BASOPHILS NFR BLD AUTO: 0.7 %
BILIRUB SERPL-MCNC: 0.9 MG/DL (ref 0–1.2)
BILIRUB UR STRIP.AUTO-MCNC: NEGATIVE MG/DL
BUN SERPL-MCNC: 29 MG/DL (ref 6–23)
CALCIUM SERPL-MCNC: 10.1 MG/DL (ref 8.6–10.3)
CARDIAC TROPONIN I PNL SERPL HS: 12 NG/L (ref 0–13)
CHLORIDE SERPL-SCNC: 102 MMOL/L (ref 98–107)
CO2 SERPL-SCNC: 23 MMOL/L (ref 21–32)
COLOR UR: YELLOW
CREAT SERPL-MCNC: 1 MG/DL (ref 0.5–1.05)
EGFRCR SERPLBLD CKD-EPI 2021: 54 ML/MIN/1.73M*2
EOSINOPHIL # BLD AUTO: 0.12 X10*3/UL (ref 0–0.4)
EOSINOPHIL NFR BLD AUTO: 1.1 %
ERYTHROCYTE [DISTWIDTH] IN BLOOD BY AUTOMATED COUNT: 12.9 % (ref 11.5–14.5)
FLUAV RNA RESP QL NAA+PROBE: NOT DETECTED
FLUBV RNA RESP QL NAA+PROBE: NOT DETECTED
GLUCOSE SERPL-MCNC: 141 MG/DL (ref 74–99)
GLUCOSE UR STRIP.AUTO-MCNC: NEGATIVE MG/DL
HCT VFR BLD AUTO: 37.1 % (ref 36–46)
HGB BLD-MCNC: 11.9 G/DL (ref 12–16)
HOLD SPECIMEN: NORMAL
HOLD SPECIMEN: NORMAL
IMM GRANULOCYTES # BLD AUTO: 0.04 X10*3/UL (ref 0–0.5)
IMM GRANULOCYTES NFR BLD AUTO: 0.4 % (ref 0–0.9)
KETONES UR STRIP.AUTO-MCNC: NEGATIVE MG/DL
LEUKOCYTE ESTERASE UR QL STRIP.AUTO: ABNORMAL
LYMPHOCYTES # BLD AUTO: 1.51 X10*3/UL (ref 0.8–3)
LYMPHOCYTES NFR BLD AUTO: 14.4 %
MCH RBC QN AUTO: 29.4 PG (ref 26–34)
MCHC RBC AUTO-ENTMCNC: 32.1 G/DL (ref 32–36)
MCV RBC AUTO: 92 FL (ref 80–100)
MONOCYTES # BLD AUTO: 0.59 X10*3/UL (ref 0.05–0.8)
MONOCYTES NFR BLD AUTO: 5.6 %
NEUTROPHILS # BLD AUTO: 8.16 X10*3/UL (ref 1.6–5.5)
NEUTROPHILS NFR BLD AUTO: 77.8 %
NITRITE UR QL STRIP.AUTO: NEGATIVE
NRBC BLD-RTO: 0 /100 WBCS (ref 0–0)
PH UR STRIP.AUTO: 5 [PH]
PLATELET # BLD AUTO: 314 X10*3/UL (ref 150–450)
POTASSIUM SERPL-SCNC: 4.1 MMOL/L (ref 3.5–5.3)
PROT SERPL-MCNC: 7.5 G/DL (ref 6.4–8.2)
PROT UR STRIP.AUTO-MCNC: NEGATIVE MG/DL
RBC # BLD AUTO: 4.05 X10*6/UL (ref 4–5.2)
RBC # UR STRIP.AUTO: NEGATIVE /UL
RBC #/AREA URNS AUTO: ABNORMAL /HPF
SARS-COV-2 RNA RESP QL NAA+PROBE: NOT DETECTED
SODIUM SERPL-SCNC: 137 MMOL/L (ref 136–145)
SP GR UR STRIP.AUTO: 1.01
UROBILINOGEN UR STRIP.AUTO-MCNC: <2 MG/DL
WBC # BLD AUTO: 10.5 X10*3/UL (ref 4.4–11.3)
WBC #/AREA URNS AUTO: ABNORMAL /HPF

## 2025-01-18 PROCEDURE — 81001 URINALYSIS AUTO W/SCOPE: CPT | Performed by: EMERGENCY MEDICINE

## 2025-01-18 PROCEDURE — 2500000005 HC RX 250 GENERAL PHARMACY W/O HCPCS

## 2025-01-18 PROCEDURE — 87086 URINE CULTURE/COLONY COUNT: CPT | Mod: CONLAB | Performed by: EMERGENCY MEDICINE

## 2025-01-18 PROCEDURE — 36415 COLL VENOUS BLD VENIPUNCTURE: CPT | Performed by: EMERGENCY MEDICINE

## 2025-01-18 PROCEDURE — 85025 COMPLETE CBC W/AUTO DIFF WBC: CPT | Performed by: EMERGENCY MEDICINE

## 2025-01-18 PROCEDURE — 71045 X-RAY EXAM CHEST 1 VIEW: CPT | Performed by: RADIOLOGY

## 2025-01-18 PROCEDURE — 84484 ASSAY OF TROPONIN QUANT: CPT | Performed by: EMERGENCY MEDICINE

## 2025-01-18 PROCEDURE — 96374 THER/PROPH/DIAG INJ IV PUSH: CPT

## 2025-01-18 PROCEDURE — 71045 X-RAY EXAM CHEST 1 VIEW: CPT

## 2025-01-18 PROCEDURE — 99285 EMERGENCY DEPT VISIT HI MDM: CPT | Performed by: EMERGENCY MEDICINE

## 2025-01-18 PROCEDURE — 87636 SARSCOV2 & INF A&B AMP PRB: CPT | Performed by: EMERGENCY MEDICINE

## 2025-01-18 PROCEDURE — 2500000004 HC RX 250 GENERAL PHARMACY W/ HCPCS (ALT 636 FOR OP/ED): Performed by: EMERGENCY MEDICINE

## 2025-01-18 PROCEDURE — 80053 COMPREHEN METABOLIC PANEL: CPT | Performed by: EMERGENCY MEDICINE

## 2025-01-18 PROCEDURE — 93005 ELECTROCARDIOGRAM TRACING: CPT

## 2025-01-18 RX ORDER — ONDANSETRON 4 MG/1
4 TABLET, ORALLY DISINTEGRATING ORAL EVERY 8 HOURS PRN
Qty: 6 TABLET | Refills: 0 | Status: SHIPPED | OUTPATIENT
Start: 2025-01-18

## 2025-01-18 RX ORDER — LIDOCAINE 560 MG/1
1 PATCH PERCUTANEOUS; TOPICAL; TRANSDERMAL DAILY
Status: DISCONTINUED | OUTPATIENT
Start: 2025-01-18 | End: 2025-01-18 | Stop reason: HOSPADM

## 2025-01-18 RX ORDER — ONDANSETRON HYDROCHLORIDE 2 MG/ML
4 INJECTION, SOLUTION INTRAVENOUS ONCE
Status: COMPLETED | OUTPATIENT
Start: 2025-01-18 | End: 2025-01-18

## 2025-01-18 RX ORDER — LIDOCAINE 560 MG/1
PATCH PERCUTANEOUS; TOPICAL; TRANSDERMAL
Status: DISCONTINUED
Start: 2025-01-18 | End: 2025-01-18 | Stop reason: HOSPADM

## 2025-01-18 RX ADMIN — LIDOCAINE 1 PATCH: 560 PATCH PERCUTANEOUS; TOPICAL; TRANSDERMAL at 18:16

## 2025-01-18 RX ADMIN — LIDOCAINE 1 PATCH: 4 PATCH TOPICAL at 18:16

## 2025-01-18 RX ADMIN — ONDANSETRON 4 MG: 2 INJECTION INTRAMUSCULAR; INTRAVENOUS at 16:20

## 2025-01-18 RX ADMIN — SODIUM CHLORIDE 500 ML: 9 INJECTION, SOLUTION INTRAVENOUS at 16:21

## 2025-01-18 ASSESSMENT — PAIN DESCRIPTION - LOCATION: LOCATION: SHOULDER

## 2025-01-18 ASSESSMENT — COLUMBIA-SUICIDE SEVERITY RATING SCALE - C-SSRS
6. HAVE YOU EVER DONE ANYTHING, STARTED TO DO ANYTHING, OR PREPARED TO DO ANYTHING TO END YOUR LIFE?: NO
2. HAVE YOU ACTUALLY HAD ANY THOUGHTS OF KILLING YOURSELF?: NO
1. IN THE PAST MONTH, HAVE YOU WISHED YOU WERE DEAD OR WISHED YOU COULD GO TO SLEEP AND NOT WAKE UP?: NO

## 2025-01-18 ASSESSMENT — PAIN - FUNCTIONAL ASSESSMENT
PAIN_FUNCTIONAL_ASSESSMENT: 0-10
PAIN_FUNCTIONAL_ASSESSMENT: 0-10

## 2025-01-18 ASSESSMENT — PAIN SCALES - GENERAL
PAINLEVEL_OUTOF10: 0 - NO PAIN
PAINLEVEL_OUTOF10: 9

## 2025-01-18 ASSESSMENT — PAIN DESCRIPTION - PAIN TYPE: TYPE: CHRONIC PAIN

## 2025-01-18 ASSESSMENT — PAIN DESCRIPTION - ORIENTATION: ORIENTATION: RIGHT

## 2025-01-18 NOTE — ED PROVIDER NOTES
HPI   Chief Complaint   Patient presents with    Flu Symptoms     Nausea, vomiting, diarrhea since yesterday at 1400        chief complaint nausea and vomiting multiple times   history of present illness patient states that she woke up this morning nauseated and eventually started vomiting she states she vomited multiple times.  The nurse at the assisted living facility attempted to give her oral Zofran but patient vomited as the Zofran was being administered.  The patient denies any abdominal pain no diarrhea no chest pain no shortness of breath she normally ambulates with a walker she denies any urinary symptoms whatsoever no urinary urgency no decreased urination no hematuria or any other urinary symptoms the patient states currently she has no symptoms in particular.      this patient has a history of anxiety, CHF, chronic renal disease, diabetes, and  chronic arthritis     physical exam:    General: Vitals noted, no distress. Afebrile. Alert and oriented  x 4 .  Pupils equal and reactive bilaterally    EENT: TMs clear. Posterior oropharynx unremarkable. No meningismus. No LAD.   Tongue dry    Cardiac: Regular, rate, rhythm, no murmurs rubs or gallops.     Pulmonary: Lungs clear bilaterally with good aeration. No adventitious breath sounds. No wheezes rales or rhonchi.     Abdomen: Soft, nonsurgical. Nontender. No peritoneal signs. Normoactive bowel sounds. No pulsatile masses.  absolutely no tenderness to abdominal palpation in all quadrants    Extremities: No peripheral edema. Negative Homans bilaterally, no cords. right shoulder is chronically painful and she has limited range of motion chronically on the right shoulder she has received prior cortisone treatments for the right shoulder disability.  Neurovascular exam right upper extremity is normal    Skin: No rash. Intact.     Neuro: No focal neurologic deficits, Cranial nerves normal as tested from II through XII.            this patient does have a history  of CHF, chronic renal disease, dementia, diverticulosis electrolyte imbalance and overactive bladder        Patient History   Past Medical History:   Diagnosis Date    Anxiety     CHF (congestive heart failure)     Chronic osteoarthritis     Chronic renal disease, stage III (Multi)     Dementia with behavioral problem (Multi)     Diabetes mellitus (Multi)     Diverticulosis of intestine without perforation or abscess without bleeding     History of fall     Hyperlipemia     Hypertension     Hypokalemia     Lack of coordination     Overactive bladder     Senile dementia, uncomplicated (Multi)     Spinal stenosis     Unsteady gait     Weakness generalized      Past Surgical History:   Procedure Laterality Date    TOTAL KNEE ARTHROPLASTY Left      No family history on file.  Social History     Tobacco Use    Smoking status: Never     Passive exposure: Never    Smokeless tobacco: Never   Substance Use Topics    Alcohol use: Never    Drug use: Never       Physical Exam   ED Triage Vitals [01/18/25 1557]   Temperature Heart Rate Respirations BP   36.7 °C (98.1 °F) (!) 111 16 148/73      SpO2 Temp Source Heart Rate Source Patient Position   94 % Temporal -- --      BP Location FiO2 (%)     -- --       Physical Exam      ED Course & [facility]   ED Course as of 01/18/25 2038   Sat Jan 18, 2025   1707  EKG interpreted by me sinus rhythm, first-degree AV block, left axis deviation, ventricular rate 93, nonspecific ST-T wave changes no STEMI [AG]   1811   He states she does not want any x-rays she wants some lotion or patch on her shoulder for pain.  I will give her lidocaine patch [AG]   1814 CBC and Auto Differential(!)    CBC normal other than hemoglobin 11.9 [AG]   1815  urinalysis 6-10 white cells trace leukocyte esterase [AG]   1815 Comprehensive metabolic panel(!)   comprehensive profile glucose 141 BUN 29 [AG]   1852  urinalysis 6-10 white cells per high-powered field.  Lidocaine patch applied to right shoulder daughter is at  the bedside and states right shoulder pain is chronic no other testing for that is needed. [AG]   1853   Patient held down glass of ginger ale.  No vomiting no nausea [AG]   1854  the patient has no unusual or new urinary tract infection symptoms.  We will perform urine culture and Dr. Freeman her primary care doctor can follow-up with the culture results [AG]   1858  discussed with the patient and the daughter.  Since the patient has no new symptoms urine culture can be done on this urine to determine if a true urinary tract infection exists and then can be followed up by her Dr. Freeman [AG]   1922  chest x-ray negative for pneumonia. [AG]   1922   Flu negative COVID-negative [AG]   1923  troponin normal [AG]   1923  patient EKG today with left bundle branch block is similar to prior EKGs with left bundle branch block. [AG]   1924   Patient's doctor is Сергей Freeman she feels fine and wants to go back to her assisted living center she held down fluids well with no nausea no vomiting her blood pressure is 141/91 [AG]      ED Course User Index  [AG] Shay Britt MD         Diagnoses as of 01/18/25 2038   Nausea and vomiting, unspecified vomiting type                 No data recorded     Reinaldo Coma Scale Score: 15 (01/18/25 1603 : Mercy Villalobos, JOHN)                           Medical Decision Making      Procedure  Procedures     Shay Britt MD  01/18/25 2037       Shay Britt MD  01/18/25 2038

## 2025-01-19 LAB — HOLD SPECIMEN: NORMAL

## 2025-01-19 NOTE — DISCHARGE INSTRUCTIONS
try to drink as much clear fluid tonight as you can tolerate.  Small sips frequently   inform the assisted living staff that they need to let Dr. Сергей Freeman know that the urine culture has been ordered and he should follow-up on the urine culture in 2 days.  Return to the ER if any symptoms change or worsen.  I will call in some prescription for nausea medicine if case you need it.

## 2025-01-20 LAB
ATRIAL RATE: 93 BPM
P AXIS: 76 DEGREES
P OFFSET: 139 MS
P ONSET: 101 MS
PR INTERVAL: 224 MS
Q ONSET: 213 MS
QRS COUNT: 16 BEATS
QRS DURATION: 162 MS
QT INTERVAL: 390 MS
QTC CALCULATION(BAZETT): 484 MS
QTC FREDERICIA: 451 MS
R AXIS: -59 DEGREES
T AXIS: 100 DEGREES
T OFFSET: 408 MS
VENTRICULAR RATE: 93 BPM

## 2025-01-21 LAB — BACTERIA UR CULT: ABNORMAL

## 2025-01-22 ENCOUNTER — TELEPHONE (OUTPATIENT)
Dept: PHARMACY | Facility: HOSPITAL | Age: 89
End: 2025-01-22
Payer: MEDICARE

## 2025-01-22 NOTE — PROGRESS NOTES
EDPD Note: Lab/Chart Reviewed    Reviewed Mr./Mrs./Ms. Vanessa Adame 's chart regarding a positive urine culture/result that was taken during their recent emergency room visit. The patient was transferred to a non- facility. Therefore, I have faxed this information to Andrei at [facility] at fax number [phone] .     Susceptibility data from last 90 days.  Collected Specimen Info Organism Amoxicillin/Clavulanate Ampicillin Ampicillin/Sulbactam Cefazolin Cefazolin (uncomplicated UTIs only) Ciprofloxacin Gentamicin Nitrofurantoin Piperacillin/Tazobactam Trimethoprim/Sulfamethoxazole   01/18/25 Urine from Clean Catch/Voided Escherichia coli  S  I  S  S  S  S  S  S  S  S       No further follow up needed from EDPD Team.     Birdie Milan, FeD

## 2025-02-10 ENCOUNTER — APPOINTMENT (OUTPATIENT)
Dept: CARDIOLOGY | Facility: HOSPITAL | Age: 89
End: 2025-02-10
Payer: MEDICARE

## 2025-02-10 ENCOUNTER — HOSPITAL ENCOUNTER (INPATIENT)
Facility: HOSPITAL | Age: 89
LOS: 4 days | Discharge: NURSING, REHAB OR LONG-TERM CARE | End: 2025-02-15
Attending: EMERGENCY MEDICINE | Admitting: HOSPITALIST
Payer: MEDICARE

## 2025-02-10 ENCOUNTER — APPOINTMENT (OUTPATIENT)
Dept: RADIOLOGY | Facility: HOSPITAL | Age: 89
End: 2025-02-10
Payer: MEDICARE

## 2025-02-10 DIAGNOSIS — E86.0 DEHYDRATION: ICD-10-CM

## 2025-02-10 DIAGNOSIS — R60.0 LOCALIZED EDEMA: ICD-10-CM

## 2025-02-10 DIAGNOSIS — K57.92 DIVERTICULITIS: Primary | ICD-10-CM

## 2025-02-10 DIAGNOSIS — N39.0 URINARY TRACT INFECTION IN ELDERLY PATIENT: ICD-10-CM

## 2025-02-10 LAB
ALBUMIN SERPL BCP-MCNC: 4 G/DL (ref 3.4–5)
ALP SERPL-CCNC: 81 U/L (ref 33–136)
ALT SERPL W P-5'-P-CCNC: 10 U/L (ref 7–45)
ANION GAP SERPL CALC-SCNC: 15 MMOL/L (ref 10–20)
APPEARANCE UR: ABNORMAL
AST SERPL W P-5'-P-CCNC: 16 U/L (ref 9–39)
BACTERIA #/AREA URNS AUTO: ABNORMAL /HPF
BASOPHILS # BLD AUTO: 0.07 X10*3/UL (ref 0–0.1)
BASOPHILS NFR BLD AUTO: 0.5 %
BILIRUB SERPL-MCNC: 0.7 MG/DL (ref 0–1.2)
BILIRUB UR STRIP.AUTO-MCNC: NEGATIVE MG/DL
BUN SERPL-MCNC: 30 MG/DL (ref 6–23)
CALCIUM SERPL-MCNC: 10 MG/DL (ref 8.6–10.3)
CARDIAC TROPONIN I PNL SERPL HS: 12 NG/L (ref 0–13)
CHLORIDE SERPL-SCNC: 104 MMOL/L (ref 98–107)
CO2 SERPL-SCNC: 22 MMOL/L (ref 21–32)
COLOR UR: YELLOW
CREAT SERPL-MCNC: 1.56 MG/DL (ref 0.5–1.05)
EGFRCR SERPLBLD CKD-EPI 2021: 32 ML/MIN/1.73M*2
EOSINOPHIL # BLD AUTO: 0.06 X10*3/UL (ref 0–0.4)
EOSINOPHIL NFR BLD AUTO: 0.4 %
ERYTHROCYTE [DISTWIDTH] IN BLOOD BY AUTOMATED COUNT: 13.6 % (ref 11.5–14.5)
FLUAV RNA RESP QL NAA+PROBE: NOT DETECTED
FLUBV RNA RESP QL NAA+PROBE: NOT DETECTED
GLUCOSE SERPL-MCNC: 199 MG/DL (ref 74–99)
GLUCOSE UR STRIP.AUTO-MCNC: NEGATIVE MG/DL
HCT VFR BLD AUTO: 36.4 % (ref 36–46)
HGB BLD-MCNC: 11.6 G/DL (ref 12–16)
HOLD SPECIMEN: NORMAL
HOLD SPECIMEN: NORMAL
IMM GRANULOCYTES # BLD AUTO: 0.08 X10*3/UL (ref 0–0.5)
IMM GRANULOCYTES NFR BLD AUTO: 0.5 % (ref 0–0.9)
KETONES UR STRIP.AUTO-MCNC: NEGATIVE MG/DL
LACTATE SERPL-SCNC: 1.9 MMOL/L (ref 0.4–2)
LEUKOCYTE ESTERASE UR QL STRIP.AUTO: ABNORMAL
LYMPHOCYTES # BLD AUTO: 1.24 X10*3/UL (ref 0.8–3)
LYMPHOCYTES NFR BLD AUTO: 8.2 %
MCH RBC QN AUTO: 29.2 PG (ref 26–34)
MCHC RBC AUTO-ENTMCNC: 31.9 G/DL (ref 32–36)
MCV RBC AUTO: 92 FL (ref 80–100)
MONOCYTES # BLD AUTO: 1.02 X10*3/UL (ref 0.05–0.8)
MONOCYTES NFR BLD AUTO: 6.7 %
NEUTROPHILS # BLD AUTO: 12.68 X10*3/UL (ref 1.6–5.5)
NEUTROPHILS NFR BLD AUTO: 83.7 %
NITRITE UR QL STRIP.AUTO: NEGATIVE
NRBC BLD-RTO: 0 /100 WBCS (ref 0–0)
PH UR STRIP.AUTO: 5 [PH]
PLATELET # BLD AUTO: 301 X10*3/UL (ref 150–450)
POTASSIUM SERPL-SCNC: 4.1 MMOL/L (ref 3.5–5.3)
PROT SERPL-MCNC: 6.8 G/DL (ref 6.4–8.2)
PROT UR STRIP.AUTO-MCNC: NEGATIVE MG/DL
RBC # BLD AUTO: 3.97 X10*6/UL (ref 4–5.2)
RBC # UR STRIP.AUTO: ABNORMAL MG/DL
RBC #/AREA URNS AUTO: ABNORMAL /HPF
SARS-COV-2 RNA RESP QL NAA+PROBE: NOT DETECTED
SODIUM SERPL-SCNC: 137 MMOL/L (ref 136–145)
SP GR UR STRIP.AUTO: 1.01
SQUAMOUS #/AREA URNS AUTO: ABNORMAL /HPF
UROBILINOGEN UR STRIP.AUTO-MCNC: <2 MG/DL
WBC # BLD AUTO: 15.2 X10*3/UL (ref 4.4–11.3)
WBC #/AREA URNS AUTO: ABNORMAL /HPF
WBC CLUMPS #/AREA URNS AUTO: ABNORMAL /HPF

## 2025-02-10 PROCEDURE — 87077 CULTURE AEROBIC IDENTIFY: CPT | Mod: CONLAB | Performed by: EMERGENCY MEDICINE

## 2025-02-10 PROCEDURE — 96375 TX/PRO/DX INJ NEW DRUG ADDON: CPT

## 2025-02-10 PROCEDURE — 99285 EMERGENCY DEPT VISIT HI MDM: CPT | Mod: 25 | Performed by: EMERGENCY MEDICINE

## 2025-02-10 PROCEDURE — 36415 COLL VENOUS BLD VENIPUNCTURE: CPT | Performed by: EMERGENCY MEDICINE

## 2025-02-10 PROCEDURE — 83605 ASSAY OF LACTIC ACID: CPT | Performed by: EMERGENCY MEDICINE

## 2025-02-10 PROCEDURE — 87040 BLOOD CULTURE FOR BACTERIA: CPT | Mod: CONLAB | Performed by: EMERGENCY MEDICINE

## 2025-02-10 PROCEDURE — 2500000004 HC RX 250 GENERAL PHARMACY W/ HCPCS (ALT 636 FOR OP/ED)

## 2025-02-10 PROCEDURE — 96365 THER/PROPH/DIAG IV INF INIT: CPT

## 2025-02-10 PROCEDURE — 93005 ELECTROCARDIOGRAM TRACING: CPT

## 2025-02-10 PROCEDURE — 74176 CT ABD & PELVIS W/O CONTRAST: CPT

## 2025-02-10 PROCEDURE — 81001 URINALYSIS AUTO W/SCOPE: CPT | Performed by: EMERGENCY MEDICINE

## 2025-02-10 PROCEDURE — 2500000004 HC RX 250 GENERAL PHARMACY W/ HCPCS (ALT 636 FOR OP/ED): Performed by: EMERGENCY MEDICINE

## 2025-02-10 PROCEDURE — 80053 COMPREHEN METABOLIC PANEL: CPT | Performed by: EMERGENCY MEDICINE

## 2025-02-10 PROCEDURE — 96376 TX/PRO/DX INJ SAME DRUG ADON: CPT

## 2025-02-10 PROCEDURE — P9612 CATHETERIZE FOR URINE SPEC: HCPCS

## 2025-02-10 PROCEDURE — 74176 CT ABD & PELVIS W/O CONTRAST: CPT | Performed by: RADIOLOGY

## 2025-02-10 PROCEDURE — 85025 COMPLETE CBC W/AUTO DIFF WBC: CPT | Performed by: EMERGENCY MEDICINE

## 2025-02-10 PROCEDURE — 87636 SARSCOV2 & INF A&B AMP PRB: CPT | Performed by: EMERGENCY MEDICINE

## 2025-02-10 PROCEDURE — 84484 ASSAY OF TROPONIN QUANT: CPT | Performed by: EMERGENCY MEDICINE

## 2025-02-10 RX ORDER — CIPROFLOXACIN 2 MG/ML
400 INJECTION, SOLUTION INTRAVENOUS ONCE
Status: COMPLETED | OUTPATIENT
Start: 2025-02-10 | End: 2025-02-10

## 2025-02-10 RX ORDER — FENTANYL CITRATE 50 UG/ML
25 INJECTION, SOLUTION INTRAMUSCULAR; INTRAVENOUS ONCE
Status: COMPLETED | OUTPATIENT
Start: 2025-02-10 | End: 2025-02-10

## 2025-02-10 RX ORDER — MORPHINE SULFATE 2 MG/ML
1 INJECTION, SOLUTION INTRAMUSCULAR; INTRAVENOUS EVERY 4 HOURS PRN
Status: DISCONTINUED | OUTPATIENT
Start: 2025-02-10 | End: 2025-02-15 | Stop reason: HOSPADM

## 2025-02-10 RX ORDER — OXYCODONE HYDROCHLORIDE 5 MG/1
10 TABLET ORAL EVERY 6 HOURS PRN
Status: DISCONTINUED | OUTPATIENT
Start: 2025-02-10 | End: 2025-02-15 | Stop reason: HOSPADM

## 2025-02-10 RX ORDER — ONDANSETRON HYDROCHLORIDE 2 MG/ML
4 INJECTION, SOLUTION INTRAVENOUS ONCE
Status: COMPLETED | OUTPATIENT
Start: 2025-02-10 | End: 2025-02-10

## 2025-02-10 RX ORDER — ACETAMINOPHEN 325 MG/1
650 TABLET ORAL EVERY 6 HOURS PRN
Status: DISCONTINUED | OUTPATIENT
Start: 2025-02-10 | End: 2025-02-15 | Stop reason: HOSPADM

## 2025-02-10 RX ORDER — OXYCODONE HYDROCHLORIDE 5 MG/1
5 TABLET ORAL EVERY 6 HOURS PRN
Status: DISCONTINUED | OUTPATIENT
Start: 2025-02-10 | End: 2025-02-15 | Stop reason: HOSPADM

## 2025-02-10 RX ORDER — METRONIDAZOLE 500 MG/100ML
INJECTION, SOLUTION INTRAVENOUS
Status: COMPLETED
Start: 2025-02-10 | End: 2025-02-11

## 2025-02-10 RX ORDER — CALCIUM CARBONATE 200(500)MG
500 TABLET,CHEWABLE ORAL 4 TIMES DAILY PRN
Status: DISCONTINUED | OUTPATIENT
Start: 2025-02-10 | End: 2025-02-11

## 2025-02-10 RX ORDER — SODIUM CHLORIDE 9 MG/ML
100 INJECTION, SOLUTION INTRAVENOUS CONTINUOUS
Status: ACTIVE | OUTPATIENT
Start: 2025-02-10 | End: 2025-02-11

## 2025-02-10 RX ORDER — HEPARIN SODIUM 5000 [USP'U]/ML
5000 INJECTION, SOLUTION INTRAVENOUS; SUBCUTANEOUS EVERY 12 HOURS
Status: DISCONTINUED | OUTPATIENT
Start: 2025-02-10 | End: 2025-02-11

## 2025-02-10 RX ORDER — MORPHINE SULFATE 2 MG/ML
2 INJECTION, SOLUTION INTRAMUSCULAR; INTRAVENOUS EVERY 4 HOURS PRN
Status: DISCONTINUED | OUTPATIENT
Start: 2025-02-10 | End: 2025-02-15 | Stop reason: HOSPADM

## 2025-02-10 RX ORDER — ACETAMINOPHEN 500 MG
10 TABLET ORAL DAILY PRN
Status: DISCONTINUED | OUTPATIENT
Start: 2025-02-10 | End: 2025-02-15 | Stop reason: HOSPADM

## 2025-02-10 RX ORDER — METRONIDAZOLE 500 MG/100ML
500 INJECTION, SOLUTION INTRAVENOUS ONCE
Status: COMPLETED | OUTPATIENT
Start: 2025-02-10 | End: 2025-02-11

## 2025-02-10 RX ORDER — CIPROFLOXACIN 2 MG/ML
400 INJECTION, SOLUTION INTRAVENOUS EVERY 12 HOURS
Status: DISCONTINUED | OUTPATIENT
Start: 2025-02-11 | End: 2025-02-12

## 2025-02-10 RX ORDER — MORPHINE SULFATE 4 MG/ML
4 INJECTION INTRAVENOUS ONCE
Status: COMPLETED | OUTPATIENT
Start: 2025-02-10 | End: 2025-02-10

## 2025-02-10 RX ORDER — ALUMINUM HYDROXIDE, MAGNESIUM HYDROXIDE, AND SIMETHICONE 1200; 120; 1200 MG/30ML; MG/30ML; MG/30ML
20 SUSPENSION ORAL 4 TIMES DAILY PRN
Status: DISCONTINUED | OUTPATIENT
Start: 2025-02-10 | End: 2025-02-11

## 2025-02-10 RX ORDER — ONDANSETRON HYDROCHLORIDE 2 MG/ML
4 INJECTION, SOLUTION INTRAVENOUS EVERY 4 HOURS PRN
Status: DISCONTINUED | OUTPATIENT
Start: 2025-02-10 | End: 2025-02-15 | Stop reason: HOSPADM

## 2025-02-10 RX ORDER — METRONIDAZOLE 500 MG/100ML
500 INJECTION, SOLUTION INTRAVENOUS EVERY 8 HOURS
Status: DISCONTINUED | OUTPATIENT
Start: 2025-02-11 | End: 2025-02-12

## 2025-02-10 RX ADMIN — METRONIDAZOLE 500 MG: 500 INJECTION, SOLUTION INTRAVENOUS at 23:40

## 2025-02-10 RX ADMIN — SODIUM CHLORIDE 125 ML/HR: 9 INJECTION, SOLUTION INTRAVENOUS at 22:08

## 2025-02-10 RX ADMIN — ONDANSETRON 4 MG: 2 INJECTION INTRAMUSCULAR; INTRAVENOUS at 20:50

## 2025-02-10 RX ADMIN — CIPROFLOXACIN 400 MG: 2 INJECTION, SOLUTION INTRAVENOUS at 22:26

## 2025-02-10 RX ADMIN — MORPHINE SULFATE 4 MG: 4 INJECTION, SOLUTION INTRAMUSCULAR; INTRAVENOUS at 23:50

## 2025-02-10 RX ADMIN — FENTANYL CITRATE 25 MCG: 50 INJECTION INTRAMUSCULAR; INTRAVENOUS at 22:09

## 2025-02-10 RX ADMIN — ONDANSETRON 4 MG: 2 INJECTION INTRAMUSCULAR; INTRAVENOUS at 23:50

## 2025-02-10 ASSESSMENT — PAIN - FUNCTIONAL ASSESSMENT: PAIN_FUNCTIONAL_ASSESSMENT: 0-10

## 2025-02-10 ASSESSMENT — PAIN SCALES - GENERAL
PAINLEVEL_OUTOF10: 7
PAINLEVEL_OUTOF10: 8
PAINLEVEL_OUTOF10: 6
PAINLEVEL_OUTOF10: 7
PAINLEVEL_OUTOF10: 6

## 2025-02-10 ASSESSMENT — PAIN DESCRIPTION - LOCATION
LOCATION: ABDOMEN

## 2025-02-10 ASSESSMENT — PAIN DESCRIPTION - ORIENTATION: ORIENTATION: LOWER

## 2025-02-10 ASSESSMENT — PAIN DESCRIPTION - PAIN TYPE: TYPE: ACUTE PAIN

## 2025-02-11 ENCOUNTER — APPOINTMENT (OUTPATIENT)
Dept: RADIOLOGY | Facility: HOSPITAL | Age: 89
End: 2025-02-11
Payer: MEDICARE

## 2025-02-11 PROBLEM — K52.9 COLITIS: Status: RESOLVED | Noted: 2024-01-28 | Resolved: 2025-02-11
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
## 2025-02-11 PROBLEM — S09.90XA CLOSED HEAD INJURY: Status: RESOLVED | Noted: 2024-11-19 | Resolved: 2025-02-11

## 2025-02-11 PROBLEM — R53.83 LETHARGY: Status: RESOLVED | Noted: 2024-09-06 | Resolved: 2025-02-11

## 2025-02-11 PROBLEM — S30.0XXA CONTUSION OF PELVIS: Status: RESOLVED | Noted: 2024-11-19 | Resolved: 2025-02-11

## 2025-02-11 PROBLEM — I50.9 CHF (CONGESTIVE HEART FAILURE): Status: ACTIVE | Noted: 2025-02-11

## 2025-02-11 PROBLEM — S16.1XXA CERVICAL STRAIN: Status: RESOLVED | Noted: 2024-11-19 | Resolved: 2025-02-11

## 2025-02-11 PROBLEM — I10 HTN (HYPERTENSION): Status: ACTIVE | Noted: 2025-02-11

## 2025-02-11 PROBLEM — N32.81 OAB (OVERACTIVE BLADDER): Status: ACTIVE | Noted: 2025-02-11

## 2025-02-11 PROBLEM — U07.1 COVID-19 VIRUS INFECTION: Status: RESOLVED | Noted: 2024-09-06 | Resolved: 2025-02-11

## 2025-02-11 LAB
ANION GAP SERPL CALC-SCNC: 11 MMOL/L (ref 10–20)
APTT PPP: 32 SECONDS (ref 27–38)
BASOPHILS # BLD AUTO: 0.03 X10*3/UL (ref 0–0.1)
BASOPHILS NFR BLD AUTO: 0.3 %
BUN SERPL-MCNC: 29 MG/DL (ref 6–23)
CALCIUM SERPL-MCNC: 9 MG/DL (ref 8.6–10.3)
CHLORIDE SERPL-SCNC: 107 MMOL/L (ref 98–107)
CO2 SERPL-SCNC: 25 MMOL/L (ref 21–32)
CREAT SERPL-MCNC: 1.44 MG/DL (ref 0.5–1.05)
D DIMER PPP FEU-MCNC: 3864 NG/ML FEU
EGFRCR SERPLBLD CKD-EPI 2021: 35 ML/MIN/1.73M*2
EOSINOPHIL # BLD AUTO: 0.04 X10*3/UL (ref 0–0.4)
EOSINOPHIL NFR BLD AUTO: 0.4 %
ERYTHROCYTE [DISTWIDTH] IN BLOOD BY AUTOMATED COUNT: 13.7 % (ref 11.5–14.5)
GLUCOSE SERPL-MCNC: 151 MG/DL (ref 74–99)
HCT VFR BLD AUTO: 30 % (ref 36–46)
HGB BLD-MCNC: 9.8 G/DL (ref 12–16)
HOLD SPECIMEN: NORMAL
IMM GRANULOCYTES # BLD AUTO: 0.04 X10*3/UL (ref 0–0.5)
IMM GRANULOCYTES NFR BLD AUTO: 0.4 % (ref 0–0.9)
LYMPHOCYTES # BLD AUTO: 1.44 X10*3/UL (ref 0.8–3)
LYMPHOCYTES NFR BLD AUTO: 14.3 %
MCH RBC QN AUTO: 29.5 PG (ref 26–34)
MCHC RBC AUTO-ENTMCNC: 32.7 G/DL (ref 32–36)
MCV RBC AUTO: 90 FL (ref 80–100)
MONOCYTES # BLD AUTO: 0.77 X10*3/UL (ref 0.05–0.8)
MONOCYTES NFR BLD AUTO: 7.6 %
NEUTROPHILS # BLD AUTO: 7.75 X10*3/UL (ref 1.6–5.5)
NEUTROPHILS NFR BLD AUTO: 77 %
NRBC BLD-RTO: 0 /100 WBCS (ref 0–0)
PLATELET # BLD AUTO: 223 X10*3/UL (ref 150–450)
POTASSIUM SERPL-SCNC: 4.4 MMOL/L (ref 3.5–5.3)
RBC # BLD AUTO: 3.32 X10*6/UL (ref 4–5.2)
SODIUM SERPL-SCNC: 139 MMOL/L (ref 136–145)
UFH PPP CHRO-ACNC: 1.7 IU/ML
UFH PPP CHRO-ACNC: >2 IU/ML
WBC # BLD AUTO: 10.1 X10*3/UL (ref 4.4–11.3)

## 2025-02-11 PROCEDURE — 85730 THROMBOPLASTIN TIME PARTIAL: CPT | Mod: IPSPLIT | Performed by: NURSE PRACTITIONER

## 2025-02-11 PROCEDURE — 80048 BASIC METABOLIC PNL TOTAL CA: CPT | Mod: IPSPLIT | Performed by: HOSPITALIST

## 2025-02-11 PROCEDURE — 85025 COMPLETE CBC W/AUTO DIFF WBC: CPT | Mod: IPSPLIT | Performed by: HOSPITALIST

## 2025-02-11 PROCEDURE — 1210000001 HC SEMI-PRIVATE ROOM DAILY: Mod: IPSPLIT

## 2025-02-11 PROCEDURE — 2500000004 HC RX 250 GENERAL PHARMACY W/ HCPCS (ALT 636 FOR OP/ED): Mod: IPSPLIT | Performed by: NURSE PRACTITIONER

## 2025-02-11 PROCEDURE — 2500000002 HC RX 250 W HCPCS SELF ADMINISTERED DRUGS (ALT 637 FOR MEDICARE OP, ALT 636 FOR OP/ED): Mod: IPSPLIT | Performed by: NURSE PRACTITIONER

## 2025-02-11 PROCEDURE — 85379 FIBRIN DEGRADATION QUANT: CPT | Mod: IPSPLIT | Performed by: INTERNAL MEDICINE

## 2025-02-11 PROCEDURE — 36415 COLL VENOUS BLD VENIPUNCTURE: CPT | Mod: IPSPLIT | Performed by: HOSPITALIST

## 2025-02-11 PROCEDURE — 2500000001 HC RX 250 WO HCPCS SELF ADMINISTERED DRUGS (ALT 637 FOR MEDICARE OP): Mod: IPSPLIT | Performed by: NURSE PRACTITIONER

## 2025-02-11 PROCEDURE — 93970 EXTREMITY STUDY: CPT | Mod: IPSPLIT

## 2025-02-11 PROCEDURE — 93971 EXTREMITY STUDY: CPT | Performed by: RADIOLOGY

## 2025-02-11 PROCEDURE — 85520 HEPARIN ASSAY: CPT | Mod: IPSPLIT | Performed by: NURSE PRACTITIONER

## 2025-02-11 PROCEDURE — 94760 N-INVAS EAR/PLS OXIMETRY 1: CPT | Mod: IPSPLIT

## 2025-02-11 PROCEDURE — 36415 COLL VENOUS BLD VENIPUNCTURE: CPT | Mod: IPSPLIT | Performed by: INTERNAL MEDICINE

## 2025-02-11 PROCEDURE — 99223 1ST HOSP IP/OBS HIGH 75: CPT | Performed by: NURSE PRACTITIONER

## 2025-02-11 PROCEDURE — 2500000004 HC RX 250 GENERAL PHARMACY W/ HCPCS (ALT 636 FOR OP/ED): Mod: IPSPLIT | Performed by: HOSPITALIST

## 2025-02-11 RX ORDER — FUROSEMIDE 40 MG/1
40 TABLET ORAL DAILY
Status: DISCONTINUED | OUTPATIENT
Start: 2025-02-11 | End: 2025-02-15 | Stop reason: HOSPADM

## 2025-02-11 RX ORDER — ALUMINUM HYDROXIDE, MAGNESIUM HYDROXIDE, AND SIMETHICONE 1200; 120; 1200 MG/30ML; MG/30ML; MG/30ML
20 SUSPENSION ORAL 4 TIMES DAILY PRN
Status: DISCONTINUED | OUTPATIENT
Start: 2025-02-11 | End: 2025-02-15 | Stop reason: HOSPADM

## 2025-02-11 RX ORDER — CETIRIZINE HYDROCHLORIDE 10 MG/1
10 TABLET ORAL DAILY
Status: DISCONTINUED | OUTPATIENT
Start: 2025-02-11 | End: 2025-02-15 | Stop reason: HOSPADM

## 2025-02-11 RX ORDER — CALCIUM CARBONATE 200(500)MG
500 TABLET,CHEWABLE ORAL 4 TIMES DAILY PRN
Status: DISCONTINUED | OUTPATIENT
Start: 2025-02-11 | End: 2025-02-15 | Stop reason: HOSPADM

## 2025-02-11 RX ORDER — FLUTICASONE PROPIONATE 50 MCG
1 SPRAY, SUSPENSION (ML) NASAL DAILY PRN
Status: DISCONTINUED | OUTPATIENT
Start: 2025-02-11 | End: 2025-02-15 | Stop reason: HOSPADM

## 2025-02-11 RX ORDER — PANTOPRAZOLE SODIUM 40 MG/1
40 TABLET, DELAYED RELEASE ORAL
Status: DISCONTINUED | OUTPATIENT
Start: 2025-02-12 | End: 2025-02-15 | Stop reason: HOSPADM

## 2025-02-11 RX ORDER — OXYBUTYNIN CHLORIDE 5 MG/1
5 TABLET ORAL 2 TIMES DAILY
Status: DISCONTINUED | OUTPATIENT
Start: 2025-02-11 | End: 2025-02-15 | Stop reason: HOSPADM

## 2025-02-11 RX ORDER — ENALAPRIL MALEATE 5 MG/1
20 TABLET ORAL DAILY
Status: DISCONTINUED | OUTPATIENT
Start: 2025-02-11 | End: 2025-02-15 | Stop reason: HOSPADM

## 2025-02-11 RX ORDER — METOPROLOL TARTRATE 25 MG/1
25 TABLET, FILM COATED ORAL 2 TIMES DAILY
Status: DISCONTINUED | OUTPATIENT
Start: 2025-02-11 | End: 2025-02-15 | Stop reason: HOSPADM

## 2025-02-11 RX ORDER — HEPARIN SODIUM 10000 [USP'U]/100ML
0-4500 INJECTION, SOLUTION INTRAVENOUS CONTINUOUS
Status: DISCONTINUED | OUTPATIENT
Start: 2025-02-11 | End: 2025-02-13

## 2025-02-11 RX ADMIN — METRONIDAZOLE 500 MG: 500 INJECTION, SOLUTION INTRAVENOUS at 15:53

## 2025-02-11 RX ADMIN — METOPROLOL TARTRATE 25 MG: 25 TABLET, FILM COATED ORAL at 21:28

## 2025-02-11 RX ADMIN — METRONIDAZOLE 500 MG: 500 INJECTION, SOLUTION INTRAVENOUS at 09:00

## 2025-02-11 RX ADMIN — CIPROFLOXACIN 400 MG: 2 INJECTION, SOLUTION INTRAVENOUS at 10:23

## 2025-02-11 RX ADMIN — CIPROFLOXACIN 400 MG: 2 INJECTION, SOLUTION INTRAVENOUS at 21:28

## 2025-02-11 RX ADMIN — ENALAPRIL MALEATE 20 MG: 5 TABLET ORAL at 10:42

## 2025-02-11 RX ADMIN — HEPARIN SODIUM 5000 UNITS: 5000 INJECTION, SOLUTION INTRAVENOUS; SUBCUTANEOUS at 12:40

## 2025-02-11 RX ADMIN — HEPARIN SODIUM 1400 UNITS/HR: 10000 INJECTION, SOLUTION INTRAVENOUS at 15:53

## 2025-02-11 RX ADMIN — OXYBUTYNIN CHLORIDE 5 MG: 5 TABLET ORAL at 10:42

## 2025-02-11 RX ADMIN — CETIRIZINE HYDROCHLORIDE 10 MG: 10 TABLET, FILM COATED ORAL at 10:42

## 2025-02-11 RX ADMIN — HEPARIN SODIUM 5000 UNITS: 5000 INJECTION, SOLUTION INTRAVENOUS; SUBCUTANEOUS at 01:53

## 2025-02-11 RX ADMIN — OXYBUTYNIN CHLORIDE 5 MG: 5 TABLET ORAL at 20:20

## 2025-02-11 RX ADMIN — METOPROLOL TARTRATE 25 MG: 25 TABLET, FILM COATED ORAL at 10:42

## 2025-02-11 SDOH — SOCIAL STABILITY: SOCIAL INSECURITY: DOES ANYONE TRY TO KEEP YOU FROM HAVING/CONTACTING OTHER FRIENDS OR DOING THINGS OUTSIDE YOUR HOME?: NO

## 2025-02-11 SDOH — ECONOMIC STABILITY: INCOME INSECURITY: IN THE PAST 12 MONTHS HAS THE ELECTRIC, GAS, OIL, OR WATER COMPANY THREATENED TO SHUT OFF SERVICES IN YOUR HOME?: NO

## 2025-02-11 SDOH — SOCIAL STABILITY: SOCIAL INSECURITY: DO YOU FEEL UNSAFE GOING BACK TO THE PLACE WHERE YOU ARE LIVING?: NO

## 2025-02-11 SDOH — SOCIAL STABILITY: SOCIAL INSECURITY
WITHIN THE LAST YEAR, HAVE YOU BEEN KICKED, HIT, SLAPPED, OR OTHERWISE PHYSICALLY HURT BY YOUR PARTNER OR EX-PARTNER?: NO

## 2025-02-11 SDOH — SOCIAL STABILITY: SOCIAL INSECURITY: ARE YOU OR HAVE YOU BEEN THREATENED OR ABUSED PHYSICALLY, EMOTIONALLY, OR SEXUALLY BY ANYONE?: NO

## 2025-02-11 SDOH — SOCIAL STABILITY: SOCIAL INSECURITY: WITHIN THE LAST YEAR, HAVE YOU BEEN AFRAID OF YOUR PARTNER OR EX-PARTNER?: NO

## 2025-02-11 SDOH — ECONOMIC STABILITY: FOOD INSECURITY: WITHIN THE PAST 12 MONTHS, THE FOOD YOU BOUGHT JUST DIDN'T LAST AND YOU DIDN'T HAVE MONEY TO GET MORE.: NEVER TRUE

## 2025-02-11 SDOH — SOCIAL STABILITY: SOCIAL INSECURITY: HAS ANYONE EVER THREATENED TO HURT YOUR FAMILY OR YOUR PETS?: NO

## 2025-02-11 SDOH — SOCIAL STABILITY: SOCIAL INSECURITY: HAVE YOU HAD ANY THOUGHTS OF HARMING ANYONE ELSE?: NO

## 2025-02-11 SDOH — SOCIAL STABILITY: SOCIAL INSECURITY: WITHIN THE LAST YEAR, HAVE YOU BEEN HUMILIATED OR EMOTIONALLY ABUSED IN OTHER WAYS BY YOUR PARTNER OR EX-PARTNER?: NO

## 2025-02-11 SDOH — SOCIAL STABILITY: SOCIAL INSECURITY
WITHIN THE LAST YEAR, HAVE YOU BEEN RAPED OR FORCED TO HAVE ANY KIND OF SEXUAL ACTIVITY BY YOUR PARTNER OR EX-PARTNER?: NO

## 2025-02-11 SDOH — SOCIAL STABILITY: SOCIAL INSECURITY: DO YOU FEEL ANYONE HAS EXPLOITED OR TAKEN ADVANTAGE OF YOU FINANCIALLY OR OF YOUR PERSONAL PROPERTY?: NO

## 2025-02-11 SDOH — ECONOMIC STABILITY: FOOD INSECURITY: WITHIN THE PAST 12 MONTHS, YOU WORRIED THAT YOUR FOOD WOULD RUN OUT BEFORE YOU GOT THE MONEY TO BUY MORE.: NEVER TRUE

## 2025-02-11 SDOH — SOCIAL STABILITY: SOCIAL INSECURITY: WERE YOU ABLE TO COMPLETE ALL THE BEHAVIORAL HEALTH SCREENINGS?: YES

## 2025-02-11 SDOH — SOCIAL STABILITY: SOCIAL INSECURITY: ABUSE: ADULT

## 2025-02-11 SDOH — SOCIAL STABILITY: SOCIAL INSECURITY: HAVE YOU HAD THOUGHTS OF HARMING ANYONE ELSE?: NO

## 2025-02-11 SDOH — SOCIAL STABILITY: SOCIAL INSECURITY: ARE THERE ANY APPARENT SIGNS OF INJURIES/BEHAVIORS THAT COULD BE RELATED TO ABUSE/NEGLECT?: NO

## 2025-02-11 ASSESSMENT — PATIENT HEALTH QUESTIONNAIRE - PHQ9
2. FEELING DOWN, DEPRESSED OR HOPELESS: NOT AT ALL
SUM OF ALL RESPONSES TO PHQ9 QUESTIONS 1 & 2: 0
1. LITTLE INTEREST OR PLEASURE IN DOING THINGS: NOT AT ALL

## 2025-02-11 ASSESSMENT — LIFESTYLE VARIABLES
HOW OFTEN DO YOU HAVE A DRINK CONTAINING ALCOHOL: NEVER
SKIP TO QUESTIONS 9-10: 1
AUDIT-C TOTAL SCORE: 0
HOW OFTEN DO YOU HAVE 6 OR MORE DRINKS ON ONE OCCASION: NEVER
HOW MANY STANDARD DRINKS CONTAINING ALCOHOL DO YOU HAVE ON A TYPICAL DAY: PATIENT DOES NOT DRINK
AUDIT-C TOTAL SCORE: 0

## 2025-02-11 ASSESSMENT — ENCOUNTER SYMPTOMS
DIFFICULTY URINATING: 1
DYSURIA: 1
NAUSEA: 1
ABDOMINAL PAIN: 1
CONFUSION: 1

## 2025-02-11 ASSESSMENT — COGNITIVE AND FUNCTIONAL STATUS - GENERAL
TURNING FROM BACK TO SIDE WHILE IN FLAT BAD: A LITTLE
TOILETING: A LITTLE
TOILETING: A LITTLE
PATIENT BASELINE BEDBOUND: NO
CLIMB 3 TO 5 STEPS WITH RAILING: A LOT
MOBILITY SCORE: 23
STANDING UP FROM CHAIR USING ARMS: A LITTLE
CLIMB 3 TO 5 STEPS WITH RAILING: A LOT
TURNING FROM BACK TO SIDE WHILE IN FLAT BAD: A LITTLE
MOVING TO AND FROM BED TO CHAIR: A LITTLE
HELP NEEDED FOR BATHING: A LITTLE
DAILY ACTIVITIY SCORE: 22
WALKING IN HOSPITAL ROOM: A LITTLE
STANDING UP FROM CHAIR USING ARMS: A LITTLE
DAILY ACTIVITIY SCORE: 22
MOBILITY SCORE: 18
MOBILITY SCORE: 18
DAILY ACTIVITIY SCORE: 23
MOVING TO AND FROM BED TO CHAIR: A LITTLE
HELP NEEDED FOR BATHING: A LITTLE
TOILETING: A LITTLE
CLIMB 3 TO 5 STEPS WITH RAILING: A LITTLE
WALKING IN HOSPITAL ROOM: A LITTLE

## 2025-02-11 ASSESSMENT — PAIN - FUNCTIONAL ASSESSMENT
PAIN_FUNCTIONAL_ASSESSMENT: 0-10
PAIN_FUNCTIONAL_ASSESSMENT: 0-10

## 2025-02-11 ASSESSMENT — ACTIVITIES OF DAILY LIVING (ADL)
BATHING: NEEDS ASSISTANCE
WALKS IN HOME: NEEDS ASSISTANCE
HEARING - RIGHT EAR: FUNCTIONAL
LACK_OF_TRANSPORTATION: NO
JUDGMENT_ADEQUATE_SAFELY_COMPLETE_DAILY_ACTIVITIES: YES
HEARING - LEFT EAR: FUNCTIONAL
ASSISTIVE_DEVICE: WALKER
PATIENT'S MEMORY ADEQUATE TO SAFELY COMPLETE DAILY ACTIVITIES?: YES
GROOMING: INDEPENDENT
TOILETING: INDEPENDENT
ADEQUATE_TO_COMPLETE_ADL: YES
DRESSING YOURSELF: INDEPENDENT
FEEDING YOURSELF: INDEPENDENT

## 2025-02-11 ASSESSMENT — PAIN SCALES - GENERAL
PAINLEVEL_OUTOF10: 3
PAINLEVEL_OUTOF10: 0 - NO PAIN
PAINLEVEL_OUTOF10: 3

## 2025-02-11 NOTE — CARE PLAN
The patient's goals for the shift include      The clinical goals for the shift include Patient will report decreased pain through 1900    Over the shift, the patient did meet goal. Patient had US of AMAURY, Dr Oleary and Kristan FOX notified, new orders noted. Patient was up to chair with assist x 1 with FWW. Patient took meds per order without difficulty, patient is forgetful and needs cueing to move forward with tasks. Will continue with POC, call light in reach.

## 2025-02-11 NOTE — H&P
History Of Present Illness  Vanessa Adame is a 89 y.o. female presenting with past medical history of congestive heart failure, chronic renal disease, dementia, anxiety, hypertension, hyperlipidemia, who presented to the emergency department with complaints of abdominal pain, nausea and vomiting that started that evening. She states the pain was across her lower abdomen and felt like a stomach ache. She denied any back or flank pain, urinary symptoms, fever. She did endorse having chills. ED work up showed leukocytosis and elevated creatinine. She was given IV hydration and medication for nausea and pain. CT abdomen pelvis showed diverticulosis, possible early colitis, no obstruction. Urinalysis showed cystitis. IV Cipro and Flagyl were started and she was admitted to Medicine for further treatment and evaluation and close monitoring of hemodynamic status.     VS: /54   Pulse 82   Temp 36.5 °C (97.7 °F) (Temporal)   Resp 14   Ht 1.524 m (5')   Wt 77.6 kg (171 lb 1.2 oz)   SpO2 93%   BMI 33.41 kg/m²   EKG: SR with 1st degree AVB  UA: large LE, 2+ bacteria  Significant labs: WBC 15.2, BUN/Creat 30/1.56,      Past Medical History  Past Medical History:   Diagnosis Date    Anxiety     CHF (congestive heart failure)     Chronic osteoarthritis     Chronic renal disease, stage III (Multi)     Dementia with behavioral problem (Multi)     Diabetes mellitus (Multi)     Diverticulosis of intestine without perforation or abscess without bleeding     History of fall     Hyperlipemia     Hypertension     Hypokalemia     Lack of coordination     Overactive bladder     Senile dementia, uncomplicated (Multi)     Spinal stenosis     Unsteady gait     Weakness generalized      Surgical History  Past Surgical History:   Procedure Laterality Date    TOTAL KNEE ARTHROPLASTY Left       Social History  She reports that she has never smoked. She has never been exposed to tobacco smoke. She has never used smokeless  tobacco. She reports that she does not drink alcohol and does not use drugs.    Family History  No family history on file.     Allergies  Amoxapine, Penicillin, and Sulfa (sulfonamide antibiotics)    Review of Systems   Cardiovascular:  Positive for leg swelling.   Gastrointestinal:  Positive for abdominal pain and nausea.   Genitourinary:  Positive for difficulty urinating and dysuria.   Psychiatric/Behavioral:  Positive for confusion.    All other systems reviewed and are negative.       Physical Exam  Constitutional:       General: She is not in acute distress.     Appearance: Normal appearance. She is obese. She is not toxic-appearing.   HENT:      Head: Normocephalic and atraumatic.      Mouth/Throat:      Mouth: Mucous membranes are dry.   Eyes:      Extraocular Movements: Extraocular movements intact.      Pupils: Pupils are equal, round, and reactive to light.   Cardiovascular:      Rate and Rhythm: Normal rate and regular rhythm.      Heart sounds: No murmur heard.     No gallop.   Pulmonary:      Effort: Pulmonary effort is normal. No respiratory distress.      Breath sounds: Normal breath sounds. No wheezing, rhonchi or rales.   Abdominal:      General: Bowel sounds are normal. There is no distension.      Palpations: Abdomen is soft.      Tenderness: There is abdominal tenderness (left upper/lower quads). There is no guarding or rebound.   Musculoskeletal:         General: No swelling, tenderness, deformity or signs of injury. Normal range of motion.      Cervical back: Normal range of motion and neck supple.      Right lower leg: Edema present.      Left lower leg: Edema present.   Skin:     General: Skin is warm and dry.      Capillary Refill: Capillary refill takes less than 2 seconds.      Coloration: Skin is not jaundiced.      Findings: No bruising or rash.   Neurological:      General: No focal deficit present.      Mental Status: She is alert. Mental status is at baseline.   Psychiatric:          Mood and Affect: Mood normal.         Behavior: Behavior normal.          Last Recorded Vitals  Blood pressure 122/54, pulse 82, temperature 36.5 °C (97.7 °F), temperature source Temporal, resp. rate 14, height 1.524 m (5'), weight 77.6 kg (171 lb 1.2 oz), SpO2 93%.    Scheduled medications  cetirizine, 10 mg, oral, Daily  ciprofloxacin, 400 mg, intravenous, q12h  enalapril, 20 mg, oral, Daily  [Held by provider] furosemide, 40 mg, oral, Daily  heparin, 5,000 Units, subcutaneous, q12h  metoprolol tartrate, 25 mg, oral, BID  metroNIDAZOLE, 500 mg, intravenous, q8h  oxybutynin, 5 mg, oral, BID  [START ON 2/12/2025] pantoprazole, 40 mg, oral, Daily before breakfast    Continuous medications  sodium chloride 0.9%, 100 mL/hr, Last Rate: 100 mL/hr (02/11/25 1004)    PRN medications  PRN medications: acetaminophen, alum-mag hydroxide-simeth, benzocaine-menthol, calcium carbonate, fluticasone, melatonin, morphine, morphine, ondansetron, oxyCODONE, oxyCODONE    Relevant Results  ECG 12 lead  Result Date: 2/11/2025   Poor data quality, interpretation may be adversely affected Sinus rhythm with 1st degree AV block Left axis deviation Left bundle branch block Abnormal ECG When compared with ECG of 18-JAN-2025 17:04, (unconfirmed) No significant change was found    CT abdomen pelvis wo IV contrast  Result Date: 2/10/2025  Nonobstructing left renal calculus without hydronephrosis or hydroureter.   Minor fat stranding adjacent to the distal descending colon. Correlate with potential early colitis.   No other definite acute process abdomen or pelvis on noncontrast imaging.   MACRO: None.   Signed by: Carson Welsh 2/10/2025 11:22 PM Dictation workstation:   [id number]      Latest Reference Range & Units 02/10/25 21:19 02/11/25 06:02   GLUCOSE 74 - 99 mg/dL 199 (H) 151 (H)   SODIUM 136 - 145 mmol/L 137 139   POTASSIUM 3.5 - 5.3 mmol/L 4.1 4.4   CHLORIDE 98 - 107 mmol/L 104 107   Bicarbonate 21 - 32 mmol/L 22 25   Anion Gap 10 - 20  mmol/L 15 11   Blood Urea Nitrogen 6 - 23 mg/dL 30 (H) 29 (H)   Creatinine 0.50 - 1.05 mg/dL 1.56 (H) 1.44 (H)   EGFR >60 mL/min/1.73m*2 32 (L) 35 (L)   Calcium 8.6 - 10.3 mg/dL 10.0 9.0      Latest Reference Range & Units 02/10/25 21:19 02/11/25 06:02   WBC 4.4 - 11.3 x10*3/uL 15.2 (H) 10.1   nRBC 0.0 - 0.0 /100 WBCs 0.0 0.0   RBC 4.00 - 5.20 x10*6/uL 3.97 (L) 3.32 (L)   HEMOGLOBIN 12.0 - 16.0 g/dL 11.6 (L) 9.8 (L)   HEMATOCRIT 36.0 - 46.0 % 36.4 30.0 (L)   MCV 80 - 100 fL 92 90   MCH 26.0 - 34.0 pg 29.2 29.5   MCHC 32.0 - 36.0 g/dL 31.9 (L) 32.7   RED CELL DISTRIBUTION WIDTH 11.5 - 14.5 % 13.6 13.7   Platelets 150 - 450 x10*3/uL 301 223     Assessment/Plan   Assessment & Plan  Diverticulitis    Urinary tract infection in elderly patient    Dehydration    Nausea and vomiting    HTN (hypertension)    CHF (congestive heart failure)    OAB (overactive bladder)      Diverticulitis  - WBC 15.2  - CT A/P: Nonobstructing left renal calculus without hydronephrosis or hydroureter.   Minor fat stranding adjacent to the distal descending colon. Correlate with potential early colitis.   - IV cipro, flagyl, day 2  - pain relief as needed  - daily CBC  - follow blood cultures    NATACHA  Urinary tract infection in elderly patient  OAB (overactive bladder)  - creat 1.56 > 1.44  - GFR 32 > 35  - UA: LARGE LE, +WBC, 2+ BACTERIA  - avoid nephrotoxins, renally dose meds as able  - follow BMP  - continue oxybutynin    Dehydration  Nausea and vomiting  - IVF until tolerating orals  - zofran prn  - clears, ADAT    HTN (hypertension)  CHF (congestive heart failure)  Elevated D dimer  -dimer 3864  - not hypoxic, not tachy, not hypotensive  - send to [facility] for VQ if suspicions for PE  - or CT angio if creat settles   - continue enalapril, metoprolol    GI ppx: PPI  DVT ppx: heparin  Fluids: prn  Electrolytes: replace as needed  Nutrition: fulls, ADAT  Adjuncts: PIV  Code Status:  Pt requires inpatient stay at this time.      Kristan VELAZQUEZ  MARTA Kang  Attending Attestation:    Patient was seen and examined face to face, history and physical was taken personally at bedside the APRN-CNP, was present for the whole duration of the exam who participated in the documentation of this note. I performed the medical decision-making components (assessment and plan of care). I have reviewed the documentation and verified the findings in the note as written with additions or exceptions as stated in the body of this note.   Assisted living female, had some dental procedure yesterday, after procedure had a drink from daily products along with her son, she has started having abdominal pain and increasing nausea, did not relieved and was brought to emergency room.  She says she has been nauseated in the last 3 to 4 days with some weak abdominal pain but she denies having any diarrhea or any constipation no decrease in appetite despite nausea, emergency room workup showing possible diverticulitis patient also had some pyuria, on exam she is awake alert, slightly slow in answering, heart regular, lungs clear to auscultation, there is significant tenderness in the left lower and upper quadrant of abdomen, no rebound tenderness.  There is also tenderness over the suprapubic area.  When I was examining patient lower extremity, did not notice any edema except maybe mild edema around ankles bilaterally but she did have exquisite tenderness over calf more on the right side.  We will treat patient with IV fluids secondary to dehydration, give antiemetics medication treat diverticulitis and urinary tract infection awaiting cultures, continue with low residual diet advance as tolerated, check D-dimer, follow-up with further workup if it is positive.    Dr. Mayuri Avina MD  Internal Medicine

## 2025-02-11 NOTE — ED PROVIDER NOTES
"HPI   Chief Complaint   Patient presents with    Abdominal Pain    Nausea    Vomiting         History provided by:  Medical records, patient, relative and EMS personnel   used: No      This patient presents to the emergency department via BLS for evaluation of abdominal pain, nausea, vomiting that started this evening.  She does not believe she has had any fever, but she does have chills.  No cough, congestion, URI symptoms.  She localizes the pain across her lower abdomen.  She describes it as \"a stomachache\".  No back or flank pain.  No urinary symptoms.    Patient denies any chest pain, palpitations, shortness of breath.  No lightheadedness or dizziness.      Patient History   Past Medical History:   Diagnosis Date    Anxiety     CHF (congestive heart failure)     Chronic osteoarthritis     Chronic renal disease, stage III (Multi)     Dementia with behavioral problem (Multi)     Diabetes mellitus (Multi)     Diverticulosis of intestine without perforation or abscess without bleeding     History of fall     Hyperlipemia     Hypertension     Hypokalemia     Lack of coordination     Overactive bladder     Senile dementia, uncomplicated (Multi)     Spinal stenosis     Unsteady gait     Weakness generalized      Past Surgical History:   Procedure Laterality Date    TOTAL KNEE ARTHROPLASTY Left      No family history on file.  Social History     Tobacco Use    Smoking status: Never     Passive exposure: Never    Smokeless tobacco: Never   Substance Use Topics    Alcohol use: Never    Drug use: Never       Physical Exam   ED Triage Vitals [02/10/25 2034]   Temperature Heart Rate Respirations BP   36.1 °C (97 °F) 72 19 108/84      SpO2 Temp src Heart Rate Source Patient Position   98 % -- -- --      BP Location FiO2 (%)     -- --       Physical Exam  Vitals reviewed.   Constitutional:       Appearance: She is obese.   HENT:      Head: Normocephalic and atraumatic.   Eyes:      Extraocular Movements: " Extraocular movements intact.      Pupils: Pupils are equal, round, and reactive to light.   Cardiovascular:      Rate and Rhythm: Normal rate and regular rhythm.      Heart sounds: Normal heart sounds.   Pulmonary:      Effort: Pulmonary effort is normal.      Breath sounds: Normal breath sounds.   Abdominal:      General: Abdomen is protuberant. Bowel sounds are normal. There is no distension.      Palpations: Abdomen is soft.      Tenderness: There is abdominal tenderness in the right lower quadrant and left lower quadrant. There is no guarding or rebound.   Skin:     General: Skin is warm and dry.      Capillary Refill: Capillary refill takes less than 2 seconds.   Neurological:      General: No focal deficit present.      Mental Status: She is alert.   Psychiatric:         Mood and Affect: Mood normal.           ED Course & MDM   ED Course as of 02/10/25 2339   Mon Feb 10, 2025   2145 WBC, Urine(!): 11-20  Antibiotic ordered [MN]   2152 Patient reassessed, denies any improvement [MN]   2329 Patient reassessed, results reviewed [MN]      ED Course User Index  [MN] Nhung Brasher MD         Diagnoses as of 02/10/25 2339   Diverticulitis   Urinary tract infection in elderly patient   Dehydration          Labs Reviewed   CBC WITH AUTO DIFFERENTIAL - Abnormal       Result Value    WBC 15.2 (*)     nRBC 0.0      RBC 3.97 (*)     Hemoglobin 11.6 (*)     Hematocrit 36.4      MCV 92      MCH 29.2      MCHC 31.9 (*)     RDW 13.6      Platelets 301      Neutrophils % 83.7      Immature Granulocytes %, Automated 0.5      Lymphocytes % 8.2      Monocytes % 6.7      Eosinophils % 0.4      Basophils % 0.5      Neutrophils Absolute 12.68 (*)     Immature Granulocytes Absolute, Automated 0.08      Lymphocytes Absolute 1.24      Monocytes Absolute 1.02 (*)     Eosinophils Absolute 0.06      Basophils Absolute 0.07     COMPREHENSIVE METABOLIC PANEL - Abnormal    Glucose 199 (*)     Sodium 137      Potassium 4.1      Chloride  104      Bicarbonate 22      Anion Gap 15      Urea Nitrogen 30 (*)     Creatinine 1.56 (*)     eGFR 32 (*)     Calcium 10.0      Albumin 4.0      Alkaline Phosphatase 81      Total Protein 6.8      AST 16      Bilirubin, Total 0.7      ALT 10     URINALYSIS WITH REFLEX CULTURE AND MICROSCOPIC - Abnormal    Color, Urine Yellow      Appearance, Urine Hazy (*)     Specific Gravity, Urine 1.009      pH, Urine 5.0      Protein, Urine NEGATIVE      Glucose, Urine NEGATIVE      Blood, Urine SMALL (1+) (*)     Ketones, Urine NEGATIVE      Bilirubin, Urine NEGATIVE      Urobilinogen, Urine <2.0      Nitrite, Urine NEGATIVE      Leukocyte Esterase, Urine LARGE (3+) (*)    MICROSCOPIC ONLY, URINE - Abnormal    WBC, Urine 11-20 (*)     WBC Clumps, Urine FEW      RBC, Urine 1-2      Squamous Epithelial Cells, Urine 1-9 (SPARSE)      Bacteria, Urine 2+ (*)    LACTATE - Normal    Lactate 1.9      Narrative:     Venipuncture immediately after or during the administration of Metamizole may lead to falsely low results. Testing should be performed immediately prior to Metamizole dosing.   TROPONIN I, HIGH SENSITIVITY - Normal    Troponin I, High Sensitivity 12      Narrative:     Less than 99th percentile of normal range cutoff-  Female and children under 18 years old <14 ng/L; Male <21 ng/L: Negative  Repeat testing should be performed if clinically indicated.     Female and children under 18 years old 14-50 ng/L; Male 21-50 ng/L:  Consistent with possible cardiac damage and possible increased clinical   risk. Serial measurements may help to assess extent of myocardial damage.     >50 ng/L: Consistent with cardiac damage, increased clinical risk and  myocardial infarction. Serial measurements may help assess extent of   myocardial damage.      NOTE: Children less than 1 year old may have higher baseline troponin   levels and results should be interpreted in conjunction with the overall   clinical context.     NOTE: Troponin I  testing is performed using a different   testing methodology at [facility] than at [facility]. Direct result comparisons should only   be made within the same method.   SARS-COV-2 AND INFLUENZA A/B PCR - Normal    Flu A Result Not Detected      Flu B Result Not Detected      Coronavirus 2019, PCR Not Detected      Narrative:     This assay is an FDA-cleared, in vitro diagnostic nucleic acid amplification test for the qualitative detection and differentiation of SARS CoV-2/ Influenza A/B from nasopharyngeal specimens collected from individuals with signs and symptoms of respiratory tract infections, and has been validated for use at [facility]. Negative results do not preclude COVID-19/ Influenza A/B infections and should not be used as the sole basis for diagnosis, treatment, or other management decisions. Testing for SARS CoV-2 is recommended only for patients who meet current clinical and/or epidemiological criteria defined by federal, state, or local public health directives.   URINE CULTURE   BLOOD CULTURE   BLOOD CULTURE   URINALYSIS WITH REFLEX CULTURE AND MICROSCOPIC    Narrative:     The following orders were created for panel order Urinalysis with Reflex Culture and Microscopic.  Procedure                               Abnormality         Status                     ---------                               -----------         ------                     Urinalysis with Reflex C...[[id number]]  Abnormal            Final result               Extra Urine Gray Tube[[id number]]                            In process                   Please view results for these tests on the individual orders.   EXTRA URINE GRAY TUBE     ED Medication Administration from 02/10/2025 2027 to 02/10/2025 2341         Date/Time Order Dose Route Action Action by     02/10/2025 2050 EST ondansetron (Zofran) injection 4 mg 4 mg intravenous Given ESHA Gomez     02/10/2025 2208 EST sodium  chloride 0.9% infusion 125 mL/hr intravenous New Bag Rice, C     02/10/2025 2209 EST fentaNYL PF (Sublimaze) injection 25 mcg 25 mcg intravenous Given Rice, C     02/10/2025 2226 EST ciprofloxacin (Cipro) 400 mg in dextrose 5%  mL 400 mg intravenous New Bag Rice, C     02/10/2025 2326 EST ciprofloxacin (Cipro) 400 mg in dextrose 5%  mL 0 mg intravenous Stopped Rice, C     02/10/2025 2340 EST metroNIDAZOLE (Flagyl) 500 mg in sodium chloride (iso)  mL 500 mg intravenous New Bag Rice, C          CT abdomen pelvis wo IV contrast   Final Result   Nonobstructing left renal calculus without hydronephrosis or   hydroureter.        Minor fat stranding adjacent to the distal descending colon.   Correlate with potential early colitis.        No other definite acute process abdomen or pelvis on noncontrast   imaging.        MACRO:   None.        Signed by: Carson Welsh 2/10/2025 11:22 PM   Dictation workstation:   [id number]               No data recorded     [place] Coma Scale Score: 15 (02/10/25 2059 : Milagro Gomez RN)                     EKG  2034 --twelve-lead EKG was obtained and read by me. This demonstrates normal sinus rhythm with first-degree AV block, left axis deviation, left bundle branch block, but no ectopy, no ischemia, no pericarditis. There was no change compared to most recent prior EKG. 1/18/25      Medical Decision Making  This patient presents emergency department with the above history and physical.  Given presentation and history concern for the possibility of dehydration; therefore IV X established and IV hydration and antiemetics administered.  Patient given IV fentanyl for pain with no improvement; therefore, morphine ordered.    Patient does not have a surgical abdomen but does have localizing tenderness; therefore, CT scan abdomen pelvis obtained.  This was read by radiology as diverticulosis with adjacent stranding/early diverticulitis/colitis, stable liver cyst,  nonobstructive renal stone, no free air, no obstruction.    Straight cath urine shows positive pyuria.  Culture added.  Cipro and Flagyl ordered for antibiotic coverage.  On laboratory evaluation patient has an peripheral leukocytosis, BUN/creatinine elevated above baseline without significant electrolyte imbalance.  Lactate normal.    EKG shows no ischemia, arrhythmia or change from baseline.  Troponin is normal.    Patient will require hospitalization for ongoing evaluation and treatment.  Case was discussed by phone with Dr. Dubose on-call for the [facility] hospitalist group tonight including patient's past medical history, presenting signs and symptoms, current clinical condition and test results.  He has graciously accepted the patient for admission.    Procedure  Procedures     Nhung Brasher MD  02/10/25 5172

## 2025-02-12 LAB
ATRIAL RATE: 80 BPM
ATRIAL RATE: 93 BPM
HOLD SPECIMEN: NORMAL
HOLD SPECIMEN: NORMAL
P AXIS: 72 DEGREES
P AXIS: 76 DEGREES
P OFFSET: 139 MS
P OFFSET: 147 MS
P ONSET: 101 MS
P ONSET: 103 MS
PR INTERVAL: 224 MS
PR INTERVAL: 226 MS
Q ONSET: 213 MS
Q ONSET: 216 MS
QRS COUNT: 13 BEATS
QRS COUNT: 16 BEATS
QRS DURATION: 160 MS
QRS DURATION: 162 MS
QT INTERVAL: 390 MS
QT INTERVAL: 398 MS
QTC CALCULATION(BAZETT): 459 MS
QTC CALCULATION(BAZETT): 484 MS
QTC FREDERICIA: 438 MS
QTC FREDERICIA: 451 MS
R AXIS: -59 DEGREES
R AXIS: -61 DEGREES
T AXIS: 100 DEGREES
T AXIS: 93 DEGREES
T OFFSET: 408 MS
T OFFSET: 415 MS
UFH PPP CHRO-ACNC: 0.4 IU/ML
UFH PPP CHRO-ACNC: 0.5 IU/ML
UFH PPP CHRO-ACNC: 0.7 IU/ML
UFH PPP CHRO-ACNC: 0.8 IU/ML
UFH PPP CHRO-ACNC: 0.8 IU/ML
UFH PPP CHRO-ACNC: 0.9 IU/ML
UFH PPP CHRO-ACNC: 0.9 IU/ML
VENTRICULAR RATE: 80 BPM
VENTRICULAR RATE: 93 BPM

## 2025-02-12 PROCEDURE — 36415 COLL VENOUS BLD VENIPUNCTURE: CPT | Mod: IPSPLIT | Performed by: NURSE PRACTITIONER

## 2025-02-12 PROCEDURE — 85520 HEPARIN ASSAY: CPT | Mod: IPSPLIT | Performed by: NURSE PRACTITIONER

## 2025-02-12 PROCEDURE — 2500000002 HC RX 250 W HCPCS SELF ADMINISTERED DRUGS (ALT 637 FOR MEDICARE OP, ALT 636 FOR OP/ED): Mod: IPSPLIT | Performed by: NURSE PRACTITIONER

## 2025-02-12 PROCEDURE — 1210000001 HC SEMI-PRIVATE ROOM DAILY: Mod: IPSPLIT

## 2025-02-12 PROCEDURE — 99232 SBSQ HOSP IP/OBS MODERATE 35: CPT | Performed by: NURSE PRACTITIONER

## 2025-02-12 PROCEDURE — 2500000004 HC RX 250 GENERAL PHARMACY W/ HCPCS (ALT 636 FOR OP/ED): Mod: IPSPLIT | Performed by: NURSE PRACTITIONER

## 2025-02-12 PROCEDURE — 2500000001 HC RX 250 WO HCPCS SELF ADMINISTERED DRUGS (ALT 637 FOR MEDICARE OP): Mod: IPSPLIT | Performed by: NURSE PRACTITIONER

## 2025-02-12 PROCEDURE — 94760 N-INVAS EAR/PLS OXIMETRY 1: CPT | Mod: IPSPLIT

## 2025-02-12 RX ORDER — CIPROFLOXACIN 500 MG/1
500 TABLET ORAL EVERY 12 HOURS SCHEDULED
Status: DISCONTINUED | OUTPATIENT
Start: 2025-02-12 | End: 2025-02-12

## 2025-02-12 RX ORDER — CIPROFLOXACIN 500 MG/1
500 TABLET ORAL EVERY 24 HOURS
Status: DISCONTINUED | OUTPATIENT
Start: 2025-02-13 | End: 2025-02-13

## 2025-02-12 RX ORDER — METRONIDAZOLE 250 MG/1
500 TABLET ORAL EVERY 8 HOURS SCHEDULED
Status: DISCONTINUED | OUTPATIENT
Start: 2025-02-12 | End: 2025-02-13

## 2025-02-12 RX ADMIN — OXYBUTYNIN CHLORIDE 5 MG: 5 TABLET ORAL at 20:39

## 2025-02-12 RX ADMIN — CIPROFLOXACIN 400 MG: 2 INJECTION, SOLUTION INTRAVENOUS at 10:21

## 2025-02-12 RX ADMIN — METOPROLOL TARTRATE 25 MG: 25 TABLET, FILM COATED ORAL at 08:20

## 2025-02-12 RX ADMIN — HEPARIN SODIUM 700 UNITS/HR: 10000 INJECTION, SOLUTION INTRAVENOUS at 17:45

## 2025-02-12 RX ADMIN — METRONIDAZOLE 500 MG: 500 INJECTION, SOLUTION INTRAVENOUS at 00:18

## 2025-02-12 RX ADMIN — METRONIDAZOLE 500 MG: 250 TABLET ORAL at 15:48

## 2025-02-12 RX ADMIN — METRONIDAZOLE 500 MG: 250 TABLET ORAL at 21:03

## 2025-02-12 RX ADMIN — OXYBUTYNIN CHLORIDE 5 MG: 5 TABLET ORAL at 08:20

## 2025-02-12 RX ADMIN — CETIRIZINE HYDROCHLORIDE 10 MG: 10 TABLET, FILM COATED ORAL at 08:20

## 2025-02-12 RX ADMIN — PANTOPRAZOLE SODIUM 40 MG: 40 TABLET, DELAYED RELEASE ORAL at 06:03

## 2025-02-12 RX ADMIN — METRONIDAZOLE 500 MG: 500 INJECTION, SOLUTION INTRAVENOUS at 08:20

## 2025-02-12 ASSESSMENT — PAIN SCALES - GENERAL
PAINLEVEL_OUTOF10: 0 - NO PAIN
PAINLEVEL_OUTOF10: 0 - NO PAIN

## 2025-02-12 ASSESSMENT — COGNITIVE AND FUNCTIONAL STATUS - GENERAL
WALKING IN HOSPITAL ROOM: A LITTLE
TOILETING: A LITTLE
DAILY ACTIVITIY SCORE: 22
HELP NEEDED FOR BATHING: A LITTLE
TURNING FROM BACK TO SIDE WHILE IN FLAT BAD: A LITTLE
STANDING UP FROM CHAIR USING ARMS: A LITTLE
MOVING TO AND FROM BED TO CHAIR: A LITTLE
CLIMB 3 TO 5 STEPS WITH RAILING: A LOT
MOBILITY SCORE: 18

## 2025-02-12 NOTE — CARE PLAN
The patient's goals for the shift include      The clinical goals for the shift include Patient will use call bell for assitance through 1900    Over the shift, the patient did meet goal. Patient is forgetful at times, patient was up in chair for all meals. Patient is pleasant and cooperative. Patient denies discomfort at this time. Will continue with POC, call light in reach.

## 2025-02-12 NOTE — NURSING NOTE
Spoke with Beatris Dash from lab regarding heparin assay results that were drawn almost 3 hours ago, the result is not visible in the patient's chart from nursing's view, but  Beatris was able to review and give result of 0.7 for the 15:59 heparin assay draw and brought paper copy showing result

## 2025-02-12 NOTE — CARE PLAN
The clinical goals for the shift include pt will use call light for any needs throughout the shift.    Over the shift, the patient did not make progress toward the following goals. Pt resting quietly throughout the night. Continue Heparin, hold Heparin per protocol. Lyric IV ATB. VSS, HR aisha when sleeping, RA. Desat SpO2 when sleeping at times. Remained forgetful and needs cueing to move forward with tasks. Continue with POC and bed alarm. Call light within reach.

## 2025-02-13 LAB
ANION GAP SERPL CALC-SCNC: 8 MMOL/L (ref 10–20)
BACTERIA UR CULT: ABNORMAL
BUN SERPL-MCNC: 18 MG/DL (ref 6–23)
CALCIUM SERPL-MCNC: 8.9 MG/DL (ref 8.6–10.3)
CHLORIDE SERPL-SCNC: 107 MMOL/L (ref 98–107)
CO2 SERPL-SCNC: 25 MMOL/L (ref 21–32)
CREAT SERPL-MCNC: 1.02 MG/DL (ref 0.5–1.05)
EGFRCR SERPLBLD CKD-EPI 2021: 53 ML/MIN/1.73M*2
ERYTHROCYTE [DISTWIDTH] IN BLOOD BY AUTOMATED COUNT: 13.7 % (ref 11.5–14.5)
GLUCOSE SERPL-MCNC: 106 MG/DL (ref 74–99)
HCT VFR BLD AUTO: 28.5 % (ref 36–46)
HGB BLD-MCNC: 9.1 G/DL (ref 12–16)
MCH RBC QN AUTO: 29.5 PG (ref 26–34)
MCHC RBC AUTO-ENTMCNC: 31.9 G/DL (ref 32–36)
MCV RBC AUTO: 93 FL (ref 80–100)
NRBC BLD-RTO: 0 /100 WBCS (ref 0–0)
PLATELET # BLD AUTO: 209 X10*3/UL (ref 150–450)
POTASSIUM SERPL-SCNC: 3.7 MMOL/L (ref 3.5–5.3)
RBC # BLD AUTO: 3.08 X10*6/UL (ref 4–5.2)
SODIUM SERPL-SCNC: 136 MMOL/L (ref 136–145)
UFH PPP CHRO-ACNC: 0.4 IU/ML
WBC # BLD AUTO: 4.9 X10*3/UL (ref 4.4–11.3)

## 2025-02-13 PROCEDURE — 99232 SBSQ HOSP IP/OBS MODERATE 35: CPT | Performed by: NURSE PRACTITIONER

## 2025-02-13 PROCEDURE — 36415 COLL VENOUS BLD VENIPUNCTURE: CPT | Mod: IPSPLIT | Performed by: NURSE PRACTITIONER

## 2025-02-13 PROCEDURE — 85027 COMPLETE CBC AUTOMATED: CPT | Mod: IPSPLIT | Performed by: NURSE PRACTITIONER

## 2025-02-13 PROCEDURE — 80048 BASIC METABOLIC PNL TOTAL CA: CPT | Mod: IPSPLIT | Performed by: NURSE PRACTITIONER

## 2025-02-13 PROCEDURE — 2500000002 HC RX 250 W HCPCS SELF ADMINISTERED DRUGS (ALT 637 FOR MEDICARE OP, ALT 636 FOR OP/ED): Mod: IPSPLIT | Performed by: NURSE PRACTITIONER

## 2025-02-13 PROCEDURE — 94760 N-INVAS EAR/PLS OXIMETRY 1: CPT | Mod: IPSPLIT

## 2025-02-13 PROCEDURE — 2500000001 HC RX 250 WO HCPCS SELF ADMINISTERED DRUGS (ALT 637 FOR MEDICARE OP): Mod: IPSPLIT | Performed by: HOSPITALIST

## 2025-02-13 PROCEDURE — 85520 HEPARIN ASSAY: CPT | Mod: IPSPLIT | Performed by: NURSE PRACTITIONER

## 2025-02-13 PROCEDURE — 2500000001 HC RX 250 WO HCPCS SELF ADMINISTERED DRUGS (ALT 637 FOR MEDICARE OP): Mod: IPSPLIT | Performed by: NURSE PRACTITIONER

## 2025-02-13 PROCEDURE — 2500000004 HC RX 250 GENERAL PHARMACY W/ HCPCS (ALT 636 FOR OP/ED): Mod: IPSPLIT | Performed by: NURSE PRACTITIONER

## 2025-02-13 PROCEDURE — 1210000001 HC SEMI-PRIVATE ROOM DAILY: Mod: IPSPLIT

## 2025-02-13 RX ORDER — VANCOMYCIN HYDROCHLORIDE 1 G/20ML
INJECTION, POWDER, LYOPHILIZED, FOR SOLUTION INTRAVENOUS DAILY PRN
Status: DISCONTINUED | OUTPATIENT
Start: 2025-02-13 | End: 2025-02-15 | Stop reason: HOSPADM

## 2025-02-13 RX ORDER — IBUPROFEN 400 MG/1
400 TABLET ORAL EVERY 6 HOURS PRN
Status: DISCONTINUED | OUTPATIENT
Start: 2025-02-13 | End: 2025-02-15 | Stop reason: HOSPADM

## 2025-02-13 RX ORDER — VANCOMYCIN 1.75 G/350ML
1250 INJECTION, SOLUTION INTRAVENOUS EVERY 24 HOURS
Status: DISCONTINUED | OUTPATIENT
Start: 2025-02-13 | End: 2025-02-15 | Stop reason: HOSPADM

## 2025-02-13 RX ADMIN — VANCOMYCIN 1250 MG: 1.75 INJECTION, SOLUTION INTRAVENOUS at 16:56

## 2025-02-13 RX ADMIN — CIPROFLOXACIN 500 MG: 500 TABLET, FILM COATED ORAL at 08:14

## 2025-02-13 RX ADMIN — ENALAPRIL MALEATE 20 MG: 5 TABLET ORAL at 08:14

## 2025-02-13 RX ADMIN — APIXABAN 10 MG: 5 TABLET, FILM COATED ORAL at 20:52

## 2025-02-13 RX ADMIN — METRONIDAZOLE 500 MG: 250 TABLET ORAL at 14:00

## 2025-02-13 RX ADMIN — CETIRIZINE HYDROCHLORIDE 10 MG: 10 TABLET, FILM COATED ORAL at 08:15

## 2025-02-13 RX ADMIN — METOPROLOL TARTRATE 25 MG: 25 TABLET, FILM COATED ORAL at 08:15

## 2025-02-13 RX ADMIN — METRONIDAZOLE 500 MG: 250 TABLET ORAL at 06:30

## 2025-02-13 RX ADMIN — METOPROLOL TARTRATE 25 MG: 25 TABLET, FILM COATED ORAL at 20:53

## 2025-02-13 RX ADMIN — IBUPROFEN 400 MG: 400 TABLET ORAL at 21:22

## 2025-02-13 RX ADMIN — APIXABAN 10 MG: 5 TABLET, FILM COATED ORAL at 09:14

## 2025-02-13 RX ADMIN — OXYBUTYNIN CHLORIDE 5 MG: 5 TABLET ORAL at 20:53

## 2025-02-13 RX ADMIN — PANTOPRAZOLE SODIUM 40 MG: 40 TABLET, DELAYED RELEASE ORAL at 06:30

## 2025-02-13 RX ADMIN — OXYBUTYNIN CHLORIDE 5 MG: 5 TABLET ORAL at 08:14

## 2025-02-13 ASSESSMENT — PAIN SCALES - GENERAL
PAINLEVEL_OUTOF10: 0 - NO PAIN
PAINLEVEL_OUTOF10: 0 - NO PAIN
PAINLEVEL_OUTOF10: 3

## 2025-02-13 ASSESSMENT — PAIN - FUNCTIONAL ASSESSMENT: PAIN_FUNCTIONAL_ASSESSMENT: 0-10

## 2025-02-13 NOTE — NURSING NOTE
Patient alert and oriented x3 with confusion. Pleasant and cooperative. Pending urine culture results per ID. Started on IV vanco this shift. Tolerating medications. Vitals wnl. No complaints. Call light within reach. Continue to monitor.

## 2025-02-13 NOTE — PROGRESS NOTES
Vanessa Adame is a 89 y.o. female on day 1 of admission presenting with Diverticulitis.      Subjective   Patient assessed at bedside; sitting up in a chair. She complained of being hungry and wanting more food. Daughter at bedside; POC discussed.       Objective     Last Recorded Vitals  BP (!) 112/46 (BP Location: Right arm, Patient Position: Lying)   Pulse 69   Temp 36.9 °C (98.5 °F) (Temporal)   Resp 16   Wt 77.6 kg (171 lb 1.2 oz)   SpO2 98%   Intake/Output last 3 Shifts:    Intake/Output Summary (Last 24 hours) at 2/12/2025 2003  Last data filed at 2/12/2025 1700  Gross per 24 hour   Intake 660 ml   Output 600 ml   Net 60 ml       Admission Weight  Weight: 80.1 kg (176 lb 9.4 oz) (02/10/25 2034)    Daily Weight  02/11/25 : 77.6 kg (171 lb 1.2 oz)    Image Results      Physical Exam  Vitals reviewed.   Constitutional:       Appearance: Normal appearance. She is obese.   HENT:      Head: Normocephalic and atraumatic.      Right Ear: External ear normal.      Left Ear: External ear normal.      Nose: Nose normal.      Mouth/Throat:      Mouth: Mucous membranes are moist.      Pharynx: Oropharynx is clear.   Eyes:      Conjunctiva/sclera: Conjunctivae normal.      Pupils: Pupils are equal, round, and reactive to light.   Cardiovascular:      Rate and Rhythm: Normal rate and regular rhythm.      Pulses: Normal pulses.      Heart sounds: Normal heart sounds.   Pulmonary:      Effort: Pulmonary effort is normal.      Breath sounds: Normal breath sounds.   Abdominal:      General: Bowel sounds are normal.      Palpations: Abdomen is soft.      Tenderness: There is abdominal tenderness.   Musculoskeletal:         General: Normal range of motion.      Cervical back: Normal range of motion and neck supple.   Skin:     General: Skin is warm and dry.   Neurological:      General: No focal deficit present.      Mental Status: She is alert and oriented to person, place, and time.   Psychiatric:         Mood and  Affect: Mood normal.         Behavior: Behavior normal.         Relevant Results    Scheduled medications  cetirizine, 10 mg, oral, Daily  [START ON 2/13/2025] ciprofloxacin, 500 mg, oral, q24h  enalapril, 20 mg, oral, Daily  [Held by provider] furosemide, 40 mg, oral, Daily  metoprolol tartrate, 25 mg, oral, BID  metroNIDAZOLE, 500 mg, oral, q8h PEREZ  oxybutynin, 5 mg, oral, BID  pantoprazole, 40 mg, oral, Daily before breakfast      Continuous medications  heparin, 0-4,500 Units/hr, Last Rate: 700 Units/hr (02/12/25 1745)      PRN medications  PRN medications: acetaminophen, alum-mag hydroxide-simeth, benzocaine-menthol, calcium carbonate, fluticasone, heparin, melatonin, morphine, morphine, ondansetron, oxyCODONE, oxyCODONE    Results for orders placed or performed during the hospital encounter of 02/10/25 (from the past 24 hours)   Heparin Assay, UFH   Result Value Ref Range    Heparin Unfractionated 1.7 (HH) See Comment Below for Therapeutic Ranges IU/mL   Heparin Assay, UFH   Result Value Ref Range    Heparin Unfractionated 0.9 See Comment Below for Therapeutic Ranges IU/mL   Heparin Assay, UFH   Result Value Ref Range    Heparin Unfractionated 0.8 See Comment Below for Therapeutic Ranges IU/mL   Heparin Assay, UFH   Result Value Ref Range    Heparin Unfractionated 0.5 See Comment Below for Therapeutic Ranges IU/mL   Heparin Assay, UFH   Result Value Ref Range    Heparin Unfractionated 0.8 See Comment Below for Therapeutic Ranges IU/mL   Green Top   Result Value Ref Range    Extra Tube Hold for add-ons.    Lavender Top   Result Value Ref Range    Extra Tube Hold for add-ons.    Heparin Assay, UFH   Result Value Ref Range    Heparin Unfractionated 0.9 See Comment Below for Therapeutic Ranges IU/mL   Heparin Assay, UFH   Result Value Ref Range    Heparin Unfractionated 0.7 See Comment Below for Therapeutic Ranges IU/mL                   Assessment/Plan      Assessment & Plan  Diverticulitis    Urinary tract  infection in elderly patient    Dehydration    Nausea and vomiting    HTN (hypertension)    CHF (congestive heart failure)    OAB (overactive bladder)    Acute Diverticulitis  - WBC 15.2  - CT A/P: Nonobstructing left renal calculus without hydronephrosis or hydroureter.   Minor fat stranding adjacent to the distal descending colon. Correlate with potential early colitis.   - continue ciprofloxacin 500 mg daily, flagyl 500 mg q8h; day 3  - pain relief as needed  - Blood culture pending  - increase diet to regular    Acute DVT RLE  - US BLE: . Partial thrombus within the right peroneal vein at the area of  patient tenderness.      NATACHA  Acute Cystitis without hematuria  OAB (overactive bladder)  - creat 1.56 > 1.44  - GFR 32 > 35  - UA: LARGE LE, +WBC, 2+ BACTERIA  - Urine culture grew Staphylococcus aureus  - avoid nephrotoxins, renally dose meds as able  - follow BMP  - continue oxybutynin 5 mg BID     Dehydration, resolved  Nausea and vomiting, resolved  - discontinued IVF   - continue zofran  4 mg q8h, prn  - regular diet     Essential HTN (hypertension)  Chronic Diastolic Heart failure  Elevated D dimer  -dimer 3864  -normal LVSF  with an EF of 60-65% with impaired relaxation pattern LVDF  - not hypoxic, not tachy, not hypotensive  - continue enalapril 20 mg daily, metoprolol tartrate 25 mg BID  - monitor BP     GI ppx:  - continue pantoprazole 40 mg daily    DVT ppx:   -  continue heparin drip     Code Status: Full    Disposition: Pt requires inpatient stay at this time.       Qing Moser, APRN-CNP

## 2025-02-13 NOTE — CONSULTS
Vancomycin Dosing by Pharmacy- INITIAL    Vanessa Adame is a 89 y.o. year old female who Pharmacy has been consulted for vancomycin dosing for other MRSA UTI . Based on the patient's indication and renal status this patient will be dosed based on a goal AUC of 400-600.     Renal function is currently improving.    Visit Vitals  BP (!) 107/46 (BP Location: Right arm, Patient Position: Lying)   Pulse 66   Temp 36.6 °C (97.9 °F) (Temporal)   Resp 16        Lab Results   Component Value Date    CREATININE 1.02 2025    CREATININE 1.44 (H) 2025    CREATININE 1.56 (H) 02/10/2025    CREATININE 1.00 2025        Patient weight is as follows:   Vitals:    25 0041   Weight: 77.6 kg (171 lb 1.2 oz)       Cultures:  Susceptibility data for the encounter in last 14 days.  Collected Specimen Info Organism Nitrofurantoin Oxacillin Trimethoprim/Sulfamethoxazole Vancomycin   02/10/25 Urine from Straight Catheter Methicillin Resistant Staphylococcus aureus (MRSA)  S  R  S  S         I/O last 3 completed shifts:  In: 660 (8.5 mL/kg) [P.O.:360; IV Piggyback:300]  Out: 1100 (14.2 mL/kg) [Urine:1100 (0.4 mL/kg/hr)]  Weight: 77.6 kg   I/O during current shift:  I/O this shift:  In: 360 [P.O.:360]  Out: 300 [Urine:300]    Temp (24hrs), Av.6 °C (97.8 °F), Min:36.2 °C (97.1 °F), Max:36.9 °C (98.5 °F)         Assessment/Plan     Patient will not be given a loading dose.  Will initiate vancomycin maintenance,  1250 mg every 24 hours.    This dosing regimen is predicted by [facility] to result in the following pharmacokinetic parameters:  Loading dose: N/A  Regimen: 1250 mg IV every 24 hours.  Start time: 16:14 on 2025  Exposure target: AUC24 (range)400-600 mg/L.hr   XCE48-05: 370 mg/L.hr  AUC24,ss: 540 mg/L.hr  Probability of AUC24 > 400: 82 %  Ctrough,ss: 16.9 mg/L  Probability of Ctrough,ss > 20: 32 %    Follow-up level will be ordered on  at 0500 unless clinically indicated sooner.  Will continue to  monitor renal function daily while on vancomycin and order serum creatinine at least every 48 hours if not already ordered.  Follow for continued vancomycin needs, clinical response, and signs/symptoms of toxicity.       Iris Haas, PharmD

## 2025-02-13 NOTE — CARE PLAN
The patient's goals for the shift include      The clinical goals for the shift include patient till tolerate iv medications throughout shift    Patient tolerated iv heparin well over night. Had 2 therapeutic assays. Next draw at 0800. Rested majority of the night. Call light within reach. Will continue with poc.

## 2025-02-13 NOTE — PROGRESS NOTES
Vanessa Adame is a 89 y.o. female on day 2 of admission presenting with Diverticulitis.      Subjective   Patient assessed at bedside; sitting up in a chair. She denies abdominal pain and is eating regular diet well. Denies N/V/D. POC discussed; will start on apixaban BID and we discussed her urine infection. No family at the bedside today       Objective     Last Recorded Vitals  BP (!) 107/46 (BP Location: Right arm)   Pulse 66   Temp 36.6 °C (97.9 °F) (Temporal)   Resp 16   Wt 77.6 kg (171 lb 1.2 oz)   SpO2 97%   Intake/Output last 3 Shifts:    Intake/Output Summary (Last 24 hours) at 2/13/2025 1543  Last data filed at 2/13/2025 0900  Gross per 24 hour   Intake 360 ml   Output 800 ml   Net -440 ml       Admission Weight  Weight: 80.1 kg (176 lb 9.4 oz) (02/10/25 2034)    Daily Weight  02/11/25 : 77.6 kg (171 lb 1.2 oz)    Image Results      Physical Exam  Vitals reviewed.   Constitutional:       Appearance: Normal appearance. She is obese.   HENT:      Head: Normocephalic and atraumatic.      Right Ear: External ear normal.      Left Ear: External ear normal.      Nose: Nose normal.      Mouth/Throat:      Mouth: Mucous membranes are moist.      Pharynx: Oropharynx is clear.   Eyes:      Conjunctiva/sclera: Conjunctivae normal.      Pupils: Pupils are equal, round, and reactive to light.   Cardiovascular:      Rate and Rhythm: Normal rate and regular rhythm.      Pulses: Normal pulses.      Heart sounds: Normal heart sounds.   Pulmonary:      Effort: Pulmonary effort is normal.      Breath sounds: Normal breath sounds.   Abdominal:      General: Bowel sounds are normal.      Palpations: Abdomen is soft.      Tenderness: There is abdominal tenderness.   Musculoskeletal:         General: Normal range of motion.      Cervical back: Normal range of motion and neck supple.   Skin:     General: Skin is warm and dry.   Neurological:      General: No focal deficit present.      Mental Status: She is alert and  oriented to person, place, and time.   Psychiatric:         Mood and Affect: Mood normal.         Behavior: Behavior normal.       Relevant Results    Scheduled medications  apixaban, 10 mg, oral, BID   Followed by  [START ON 2/20/2025] apixaban, 5 mg, oral, BID  cetirizine, 10 mg, oral, Daily  enalapril, 20 mg, oral, Daily  furosemide, 40 mg, oral, Daily  metoprolol tartrate, 25 mg, oral, BID  oxybutynin, 5 mg, oral, BID  pantoprazole, 40 mg, oral, Daily before breakfast      Continuous medications     PRN medications  PRN medications: acetaminophen, alum-mag hydroxide-simeth, benzocaine-menthol, calcium carbonate, fluticasone, melatonin, morphine, morphine, ondansetron, oxyCODONE, oxyCODONE, vancomycin    Results for orders placed or performed during the hospital encounter of 02/10/25 (from the past 24 hours)   Heparin Assay, UFH   Result Value Ref Range    Heparin Unfractionated 0.7 See Comment Below for Therapeutic Ranges IU/mL   Heparin Assay   Result Value Ref Range    Heparin Unfractionated 0.4 See Comment Below for Therapeutic Ranges IU/mL   CBC   Result Value Ref Range    WBC 4.9 4.4 - 11.3 x10*3/uL    nRBC 0.0 0.0 - 0.0 /100 WBCs    RBC 3.08 (L) 4.00 - 5.20 x10*6/uL    Hemoglobin 9.1 (L) 12.0 - 16.0 g/dL    Hematocrit 28.5 (L) 36.0 - 46.0 %    MCV 93 80 - 100 fL    MCH 29.5 26.0 - 34.0 pg    MCHC 31.9 (L) 32.0 - 36.0 g/dL    RDW 13.7 11.5 - 14.5 %    Platelets 209 150 - 450 x10*3/uL   Basic Metabolic Panel   Result Value Ref Range    Glucose 106 (H) 74 - 99 mg/dL    Sodium 136 136 - 145 mmol/L    Potassium 3.7 3.5 - 5.3 mmol/L    Chloride 107 98 - 107 mmol/L    Bicarbonate 25 21 - 32 mmol/L    Anion Gap 8 (L) 10 - 20 mmol/L    Urea Nitrogen 18 6 - 23 mg/dL    Creatinine 1.02 0.50 - 1.05 mg/dL    eGFR 53 (L) >60 mL/min/1.73m*2    Calcium 8.9 8.6 - 10.3 mg/dL   Heparin Assay, UFH   Result Value Ref Range    Heparin Unfractionated 0.4 See Comment Below for Therapeutic Ranges IU/mL                    Assessment/Plan      Assessment & Plan  Diverticulitis    Urinary tract infection in elderly patient    Dehydration    Nausea and vomiting    HTN (hypertension)    CHF (congestive heart failure)    OAB (overactive bladder)    Acute Diverticulitis  - WBC 15.2  - CT A/P: Nonobstructing left renal calculus without hydronephrosis or hydroureter.   Minor fat stranding adjacent to the distal descending colon. Correlate with potential early colitis.   - discontinue ciprofloxacin 500 mg daily, flagyl 500 mg q8h; day 3  - pain relief as needed  - Blood culture negative to date  - tolerating  diet to regular     Acute DVT RLE  - US BLE: . Partial thrombus within the right peroneal vein at the area of  patient tenderness.   - discontinued heparin drip  - started apixaban 10 mg BID x 7 days then 5 mg BID     NATACHA  Acute Cystitis without hematuria  OAB (overactive bladder)  - creat 1.56 > 1.44  - GFR 32 > 35  - UA: LARGE LE, +WBC, 2+ BACTERIA  - Urine culture grew MRSA  - started Vancomycin pharmacy to dose  - ID consult  - avoid nephrotoxins, renally dose meds as able  - follow BMP  - continue oxybutynin 5 mg BID     Dehydration, resolved  Nausea and vomiting, resolved  - discontinued IVF   - continue zofran  4 mg q8h, prn  - regular diet     Essential HTN (hypertension)  Chronic Diastolic Heart failure  Elevated D dimer  -dimer 3864  -normal LVSF  with an EF of 60-65% with impaired relaxation pattern LVDF  - not hypoxic, not tachy, not hypotensive  - continue enalapril 20 mg daily, metoprolol tartrate 25 mg BID  - monitor BP     GI ppx:  - continue pantoprazole 40 mg daily     DVT ppx:   -  discontinue heparin drip  - started apixaban 10 mg BID x 7 day then 5 mg BID     Code Status: Full     Disposition: Pt requires inpatient stay at this time.      Qing Moser, APRN-CNP

## 2025-02-13 NOTE — CARE PLAN
The patient's goals for the shift include      The clinical goals for the shift include patient will tolerate IV medications throughout this shift    Over the shift, the patient did not make progress toward the following goals. Barriers to progression include infection. Recommendations to address these barriers include antibiotics.

## 2025-02-13 NOTE — CARE PLAN
The patient's goals for the shift include      The clinical goals for the shift include patient will tolerate IV medications throughout this shift    Over the shift, the patient did not make progress toward the following goals. Barriers to progression include infection. Recommendations to address these barriers include medication therapy.

## 2025-02-14 LAB
ANION GAP SERPL CALC-SCNC: 10 MMOL/L (ref 10–20)
BUN SERPL-MCNC: 17 MG/DL (ref 6–23)
CALCIUM SERPL-MCNC: 8.9 MG/DL (ref 8.6–10.3)
CHLORIDE SERPL-SCNC: 109 MMOL/L (ref 98–107)
CO2 SERPL-SCNC: 25 MMOL/L (ref 21–32)
CREAT SERPL-MCNC: 1.03 MG/DL (ref 0.5–1.05)
EGFRCR SERPLBLD CKD-EPI 2021: 52 ML/MIN/1.73M*2
ERYTHROCYTE [DISTWIDTH] IN BLOOD BY AUTOMATED COUNT: 13.7 % (ref 11.5–14.5)
GLUCOSE SERPL-MCNC: 105 MG/DL (ref 74–99)
HCT VFR BLD AUTO: 29.3 % (ref 36–46)
HGB BLD-MCNC: 9.2 G/DL (ref 12–16)
MCH RBC QN AUTO: 29 PG (ref 26–34)
MCHC RBC AUTO-ENTMCNC: 31.4 G/DL (ref 32–36)
MCV RBC AUTO: 92 FL (ref 80–100)
NRBC BLD-RTO: 0 /100 WBCS (ref 0–0)
PLATELET # BLD AUTO: 217 X10*3/UL (ref 150–450)
POTASSIUM SERPL-SCNC: 3.8 MMOL/L (ref 3.5–5.3)
RBC # BLD AUTO: 3.17 X10*6/UL (ref 4–5.2)
SODIUM SERPL-SCNC: 140 MMOL/L (ref 136–145)
VANCOMYCIN SERPL-MCNC: 10.5 UG/ML (ref 5–20)
WBC # BLD AUTO: 5.4 X10*3/UL (ref 4.4–11.3)

## 2025-02-14 PROCEDURE — 94760 N-INVAS EAR/PLS OXIMETRY 1: CPT | Mod: IPSPLIT

## 2025-02-14 PROCEDURE — 80202 ASSAY OF VANCOMYCIN: CPT | Mod: IPSPLIT | Performed by: NURSE PRACTITIONER

## 2025-02-14 PROCEDURE — 1210000001 HC SEMI-PRIVATE ROOM DAILY: Mod: IPSPLIT

## 2025-02-14 PROCEDURE — 2500000001 HC RX 250 WO HCPCS SELF ADMINISTERED DRUGS (ALT 637 FOR MEDICARE OP): Mod: IPSPLIT | Performed by: NURSE PRACTITIONER

## 2025-02-14 PROCEDURE — 85027 COMPLETE CBC AUTOMATED: CPT | Mod: IPSPLIT | Performed by: NURSE PRACTITIONER

## 2025-02-14 PROCEDURE — 99232 SBSQ HOSP IP/OBS MODERATE 35: CPT | Performed by: NURSE PRACTITIONER

## 2025-02-14 PROCEDURE — 82374 ASSAY BLOOD CARBON DIOXIDE: CPT | Mod: IPSPLIT | Performed by: NURSE PRACTITIONER

## 2025-02-14 PROCEDURE — 36415 COLL VENOUS BLD VENIPUNCTURE: CPT | Mod: IPSPLIT | Performed by: NURSE PRACTITIONER

## 2025-02-14 PROCEDURE — 2500000004 HC RX 250 GENERAL PHARMACY W/ HCPCS (ALT 636 FOR OP/ED): Mod: IPSPLIT | Performed by: NURSE PRACTITIONER

## 2025-02-14 PROCEDURE — 2500000002 HC RX 250 W HCPCS SELF ADMINISTERED DRUGS (ALT 637 FOR MEDICARE OP, ALT 636 FOR OP/ED): Mod: IPSPLIT | Performed by: NURSE PRACTITIONER

## 2025-02-14 RX ADMIN — PANTOPRAZOLE SODIUM 40 MG: 40 TABLET, DELAYED RELEASE ORAL at 08:39

## 2025-02-14 RX ADMIN — OXYBUTYNIN CHLORIDE 5 MG: 5 TABLET ORAL at 08:39

## 2025-02-14 RX ADMIN — APIXABAN 10 MG: 5 TABLET, FILM COATED ORAL at 08:39

## 2025-02-14 RX ADMIN — CETIRIZINE HYDROCHLORIDE 10 MG: 10 TABLET, FILM COATED ORAL at 08:39

## 2025-02-14 RX ADMIN — METOPROLOL TARTRATE 25 MG: 25 TABLET, FILM COATED ORAL at 08:39

## 2025-02-14 RX ADMIN — VANCOMYCIN 1250 MG: 1.75 INJECTION, SOLUTION INTRAVENOUS at 17:27

## 2025-02-14 RX ADMIN — METOPROLOL TARTRATE 25 MG: 25 TABLET, FILM COATED ORAL at 20:16

## 2025-02-14 RX ADMIN — ENALAPRIL MALEATE 20 MG: 5 TABLET ORAL at 08:38

## 2025-02-14 RX ADMIN — OXYBUTYNIN CHLORIDE 5 MG: 5 TABLET ORAL at 20:16

## 2025-02-14 RX ADMIN — APIXABAN 10 MG: 5 TABLET, FILM COATED ORAL at 20:15

## 2025-02-14 RX ADMIN — FUROSEMIDE 40 MG: 40 TABLET ORAL at 08:40

## 2025-02-14 ASSESSMENT — ENCOUNTER SYMPTOMS
RESPIRATORY NEGATIVE: 1
EYES NEGATIVE: 1
ENDOCRINE NEGATIVE: 1
CARDIOVASCULAR NEGATIVE: 1
MUSCULOSKELETAL NEGATIVE: 1
GASTROINTESTINAL NEGATIVE: 1
CONSTITUTIONAL NEGATIVE: 1
PSYCHIATRIC NEGATIVE: 1
NEUROLOGICAL NEGATIVE: 1

## 2025-02-14 ASSESSMENT — COGNITIVE AND FUNCTIONAL STATUS - GENERAL
MOBILITY SCORE: 23
CLIMB 3 TO 5 STEPS WITH RAILING: A LITTLE
DAILY ACTIVITIY SCORE: 23
TOILETING: A LITTLE

## 2025-02-14 ASSESSMENT — PAIN SCALES - GENERAL
PAINLEVEL_OUTOF10: 0 - NO PAIN
PAINLEVEL_OUTOF10: 0 - NO PAIN

## 2025-02-14 NOTE — CARE PLAN
The patient's goals for the shift include      The clinical goals for the shift include Patient will tolerate eliquis this shift    Patient was updated from Didi in ID and will continue to need IV ATB. Patient is very happy that she can continue to [facility]. No complaints of pain this shift. VSS

## 2025-02-14 NOTE — CARE PLAN
The patient's goals for the shift include  NA    The clinical goals for the shift include Pt will have no diarrhea this shift    Pt complaining of back pain, frequent repositioning, ibuprofen given, heat pack offered. Purewick utilized. Pt forgetful, rude to staff at times.

## 2025-02-14 NOTE — PROGRESS NOTES
Vanessa Adame is a 89 y.o. female on day 3 of admission presenting with Diverticulitis.      Subjective   Patient assessed at bedside; sitting up in a chair. She is pleasantly confused. She denies pain, SOB, fever, chills       Objective     Last Recorded Vitals  /66 (BP Location: Right arm, Patient Position: Lying)   Pulse 61   Temp 36.4 °C (97.5 °F) (Temporal)   Resp 18   Wt 77.6 kg (171 lb 1.2 oz)   SpO2 97%   Intake/Output last 3 Shifts:    Intake/Output Summary (Last 24 hours) at 2/14/2025 1329  Last data filed at 2/14/2025 0900  Gross per 24 hour   Intake 708.23 ml   Output --   Net 708.23 ml       Admission Weight  Weight: 80.1 kg (176 lb 9.4 oz) (02/10/25 2034)    Daily Weight  02/11/25 : 77.6 kg (171 lb 1.2 oz)    Image Results      Physical Exam  Vitals reviewed.   Constitutional:       Appearance: Normal appearance. She is obese.   HENT:      Head: Normocephalic and atraumatic.      Right Ear: External ear normal.      Left Ear: External ear normal.      Nose: Nose normal.      Mouth/Throat:      Mouth: Mucous membranes are moist.      Pharynx: Oropharynx is clear.   Eyes:      Conjunctiva/sclera: Conjunctivae normal.      Pupils: Pupils are equal, round, and reactive to light.   Cardiovascular:      Rate and Rhythm: Normal rate and regular rhythm.      Pulses: Normal pulses.      Heart sounds: Normal heart sounds.   Pulmonary:      Effort: Pulmonary effort is normal.      Breath sounds: Normal breath sounds.   Abdominal:      General: Bowel sounds are normal.      Palpations: Abdomen is soft.   Musculoskeletal:         General: Normal range of motion.      Cervical back: Normal range of motion and neck supple.   Skin:     General: Skin is warm and dry.   Neurological:      General: No focal deficit present.      Mental Status: She is alert and oriented to person, place, and time.   Psychiatric:         Mood and Affect: Mood normal.         Behavior: Behavior normal.        Relevant  Results    Scheduled medications  apixaban, 10 mg, oral, BID   Followed by  [START ON 2/20/2025] apixaban, 5 mg, oral, BID  cetirizine, 10 mg, oral, Daily  enalapril, 20 mg, oral, Daily  furosemide, 40 mg, oral, Daily  metoprolol tartrate, 25 mg, oral, BID  oxybutynin, 5 mg, oral, BID  pantoprazole, 40 mg, oral, Daily before breakfast  vancomycin, 1,250 mg, intravenous, q24h      Continuous medications     PRN medications  PRN medications: acetaminophen, alum-mag hydroxide-simeth, benzocaine-menthol, calcium carbonate, fluticasone, ibuprofen, melatonin, morphine, morphine, ondansetron, oxyCODONE, oxyCODONE, vancomycin    Results for orders placed or performed during the hospital encounter of 02/10/25 (from the past 24 hours)   Basic Metabolic Panel   Result Value Ref Range    Glucose 105 (H) 74 - 99 mg/dL    Sodium 140 136 - 145 mmol/L    Potassium 3.8 3.5 - 5.3 mmol/L    Chloride 109 (H) 98 - 107 mmol/L    Bicarbonate 25 21 - 32 mmol/L    Anion Gap 10 10 - 20 mmol/L    Urea Nitrogen 17 6 - 23 mg/dL    Creatinine 1.03 0.50 - 1.05 mg/dL    eGFR 52 (L) >60 mL/min/1.73m*2    Calcium 8.9 8.6 - 10.3 mg/dL   CBC   Result Value Ref Range    WBC 5.4 4.4 - 11.3 x10*3/uL    nRBC 0.0 0.0 - 0.0 /100 WBCs    RBC 3.17 (L) 4.00 - 5.20 x10*6/uL    Hemoglobin 9.2 (L) 12.0 - 16.0 g/dL    Hematocrit 29.3 (L) 36.0 - 46.0 %    MCV 92 80 - 100 fL    MCH 29.0 26.0 - 34.0 pg    MCHC 31.4 (L) 32.0 - 36.0 g/dL    RDW 13.7 11.5 - 14.5 %    Platelets 217 150 - 450 x10*3/uL   Vancomycin   Result Value Ref Range    Vancomycin 10.5 5.0 - 20.0 ug/mL                   Assessment/Plan      Assessment & Plan  Diverticulitis    Urinary tract infection in elderly patient    Dehydration    Nausea and vomiting    HTN (hypertension)    CHF (congestive heart failure)    OAB (overactive bladder)    Acute Diverticulitis, resolved  - WBC 15.2  - CT A/P: Nonobstructing left renal calculus without hydronephrosis or hydroureter.   Minor fat stranding adjacent to  the distal descending colon. Correlate with potential early colitis.   - discontinue ciprofloxacin 500 mg daily, flagyl 500 mg q8h; day 3  - pain relief as needed  - Blood culture negative to date  - tolerating  diet to regular     Acute DVT RLE  - US BLE: . Partial thrombus within the right peroneal vein at the area of  patient tenderness.   - discontinued heparin drip  - continue apixaban 10 mg BID x 7 days then 5 mg BID     NATACHA  Acute Cystitis without hematuria  OAB (overactive bladder)  - creat 1.56 > 1.44  - GFR 32 > 35  - UA: LARGE LE, +WBC, 2+ BACTERIA  - Urine culture grew MRSA  - continue Vancomycin 1,250 mg daily; day 2/7  - ID consult  - avoid nephrotoxins, renally dose meds as able  - follow BMP  - continue oxybutynin 5 mg BID     Dehydration, resolved  Nausea and vomiting, resolved  - discontinued IVF   - continue zofran  4 mg q8h, prn  - regular diet     Essential HTN (hypertension)  Chronic Diastolic Heart failure  Elevated D dimer  -dimer 3864  -normal LVSF  with an EF of 60-65% with impaired relaxation pattern LVDF  - not hypoxic, not tachy, not hypotensive  - continue enalapril 20 mg daily, metoprolol tartrate 25 mg BID  - monitor BP     GI ppx:  - continue pantoprazole 40 mg daily     DVT ppx:   -  discontinue heparin drip  - started apixaban 10 mg BID x 7 day then 5 mg BID     Code Status: Full     Disposition: Pt requires inpatient stay at this time.              Qing Moser, APRN-CNP

## 2025-02-14 NOTE — CONSULTS
Inpatient consult to Infectious Diseases  Consult performed by: MARYANNE Cheng-CNP  Consult ordered by: MARYANNE Carter-CNP  Reason for consult: MRSA UTI          Primary MD: Сергей Freeman MD        History Of Present Illness  Vanessa Adame is a 89 y.o. female with past medical history of chronic renal disease, dementia, congestive heart failure.  Visit conducted via telephone conversation with patient.  Interval phone conversation with nursing.  She presented to the emergency room with abdominal pain, nausea, vomiting.  Symptom onset was sudden with progressive worsening.  Workup revealed positive urine culture with MRSA.  Blood cultures are negative x 3-day.  Patient reports she is feeling good today.  She denies dysuria.  She denies fever chills, nausea vomiting or diarrhea.  She has been afebrile.  on room air saturating 97%.  Labs with resolved leukocytosis.  Resolving acute kidney injury.  CT of abdomen and pelvis showed nonobstructing left renal calculi without hydronephrosis or hydroureter.  Minor fat stranding adjacent to distal descending colon, potential early colitis.  She is on IV vancomycin.     Past Medical History  She has a past medical history of Anxiety, CHF (congestive heart failure), Chronic osteoarthritis, Chronic renal disease, stage III (Multi), Dementia with behavioral problem (Multi), Diabetes mellitus (Multi), Diverticulosis of intestine without perforation or abscess without bleeding, History of fall, Hyperlipemia, Hypertension, Hypokalemia, Lack of coordination, Overactive bladder, Senile dementia, uncomplicated (Multi), Spinal stenosis, Unsteady gait, and Weakness generalized.    Surgical History  She has a past surgical history that includes Total knee arthroplasty (Left).     Social History     Occupational History    Not on file   Tobacco Use    Smoking status: Never     Passive exposure: Never    Smokeless tobacco: Never   Substance and Sexual Activity     Alcohol use: Never    Drug use: Never    Sexual activity: Not Currently     Travel History   Travel since 01/14/25    No documented travel since 01/14/25              Family History  No family history on file.  Allergies  Amoxapine, Penicillin, and Sulfa (sulfonamide antibiotics)       There is no immunization history on file for this patient.  Medications  Home medications:  Medications Prior to Admission   Medication Sig Dispense Refill Last Dose/Taking    furosemide (Lasix) 40 mg tablet Take 1 tablet (40 mg) by mouth once daily.   2/10/2025    metoprolol tartrate (Lopressor) 25 mg tablet Take 1 tablet (25 mg) by mouth 2 times a day.   2/10/2025    oxybutynin (Ditropan) 5 mg tablet Take 1 tablet (5 mg) by mouth 2 times a day.   2/10/2025    pantoprazole (ProtoNix) 40 mg EC tablet Take 1 tablet (40 mg) by mouth once daily in the morning. Take before meals. Do not crush, chew, or split. 90 tablet 3 2/10/2025    potassium chloride CR 20 mEq ER tablet Take 1 tablet (20 mEq) by mouth 2 times a day. Do not crush or chew.   2/10/2025    acetaminophen (Tylenol) 325 mg tablet Take 2 tablets (650 mg) by mouth every 6 hours if needed for mild pain (1 - 3).   Unknown    dexAMETHasone (Decadron) 6 mg tablet Take 1 tablet (6 mg) by mouth once daily for 8 doses. 8 tablet 0     enalapril (Vasotec) 20 mg tablet Take 1 tablet (20 mg) by mouth once daily.       fluticasone (Flonase) 50 mcg/actuation nasal spray Administer 1 spray into each nostril once daily as needed for rhinitis. Shake gently. Before first use, prime pump. After use, clean tip and replace cap.   Unknown    ibuprofen 200 mg tablet Take 1 tablet (200 mg) by mouth once daily as needed for mild pain (1 - 3).   Unknown    loperamide (Imodium A-D) 2 mg tablet Take 1 tablet (2 mg) by mouth if needed for diarrhea.   Unknown    loratadine (Claritin) 10 mg tablet Take 1 tablet (10 mg) by mouth once daily.   Unknown    ondansetron ODT (Zofran-ODT) 4 mg disintegrating tablet  Dissolve 1 tablet (4 mg) in the mouth every 8 hours if needed for nausea or vomiting. 6 tablet 0 Unknown    pyridoxine (Vitamin B-6) 50 mg tablet Take 1 tablet (50 mg) by mouth 2 times a day.   Unknown     Current medications:  Scheduled medications  apixaban, 10 mg, oral, BID   Followed by  [START ON 2/20/2025] apixaban, 5 mg, oral, BID  cetirizine, 10 mg, oral, Daily  enalapril, 20 mg, oral, Daily  furosemide, 40 mg, oral, Daily  metoprolol tartrate, 25 mg, oral, BID  oxybutynin, 5 mg, oral, BID  pantoprazole, 40 mg, oral, Daily before breakfast  vancomycin, 1,250 mg, intravenous, q24h      Continuous medications     PRN medications  PRN medications: acetaminophen, alum-mag hydroxide-simeth, benzocaine-menthol, calcium carbonate, fluticasone, ibuprofen, melatonin, morphine, morphine, ondansetron, oxyCODONE, oxyCODONE, vancomycin    Review of Systems   Constitutional: Negative.    HENT: Negative.     Eyes: Negative.    Respiratory: Negative.     Cardiovascular: Negative.    Gastrointestinal: Negative.    Endocrine: Negative.    Genitourinary: Negative.    Musculoskeletal: Negative.    Skin: Negative.    Neurological: Negative.    Psychiatric/Behavioral: Negative.          Objective  Range of Vitals (last 24 hours)  Heart Rate:  [61-68]   Temp:  [36.4 °C (97.5 °F)-36.7 °C (98.1 °F)]   Resp:  [16-19]   BP: (107-144)/(46-66)   SpO2:  [96 %-97 %]   Daily Weight  02/11/25 : 77.6 kg (171 lb 1.2 oz)    Body mass index is 33.41 kg/m².     Physical Exam   Vitals reviewed   Relevant Results  Outside Hospital Results  No  Labs  Results from last 72 hours   Lab Units 02/14/25  0618 02/13/25  0755   WBC AUTO x10*3/uL 5.4 4.9   HEMOGLOBIN g/dL 9.2* 9.1*   HEMATOCRIT % 29.3* 28.5*   PLATELETS AUTO x10*3/uL 217 209     Results from last 72 hours   Lab Units 02/14/25  0618 02/13/25  0755   SODIUM mmol/L 140 136   POTASSIUM mmol/L 3.8 3.7   CHLORIDE mmol/L 109* 107   CO2 mmol/L 25 25   BUN mg/dL 17 18   CREATININE mg/dL 1.03 1.02  "  GLUCOSE mg/dL 105* 106*   CALCIUM mg/dL 8.9 8.9   ANION GAP mmol/L 10 8*   EGFR mL/min/1.73m*2 52* 53*         Estimated Creatinine Clearance: 34.7 mL/min (by C-G formula based on SCr of 1.03 mg/dL).  No results found for: \"CRP\", \"SEDRATE\"  No results found for: \"HIV1X2\", \"HIVCONF\", \"ZNDDZV0TA\"  No results found for: \"HEPCABINIT\", \"HEPCAB\", \"HCVPCRQUANT\"  Microbiology  Susceptibility data from last 90 days.  Collected Specimen Info Organism Amoxicillin/Clavulanate Ampicillin Ampicillin/Sulbactam Cefazolin Cefazolin (uncomplicated UTIs only) Ciprofloxacin Gentamicin Nitrofurantoin Oxacillin Piperacillin/Tazobactam   02/10/25 Urine from Straight Catheter Methicillin Resistant Staphylococcus aureus (MRSA)         S  R    01/18/25 Urine from Clean Catch/Voided Escherichia coli  S  I  S  S  S  S  S  S   S     Collected Specimen Info Organism Trimethoprim/Sulfamethoxazole Vancomycin   02/10/25 Urine from Straight Catheter Methicillin Resistant Staphylococcus aureus (MRSA)  S  S   01/18/25 Urine from Clean Catch/Voided Escherichia coli  S          Imaging  Vascular US lower extremity venous duplex bilateral    Result Date: 2/11/2025  Interpreted By:  Олег King, STUDY: [facility] US LOWER EXTREMITY VENOUS DUPLEX BILATERAL; 2/11/2025 12:15 pm   INDICATION: Elevated D-dimer   COMPARISON: 09/06/2024   ACCESSION NUMBER(S): [id number]   ORDERING CLINICIAN: NIGHAT WU   TECHNIQUE: Static grayscale, color Doppler, and spectral Doppler sonographic images of the bilateral lower extremities were obtained for duplex evaluation.   FINDINGS: LEFT LOWER EXTREMITY: There is no evidence of deep venous thrombus in the visualized portions of the common femoral vein, femoral vein, or popliteal vein. Respiratory variation and augmentation to calf pressure is noted. Visualized posterior tibial vein unremarkable. Partial thrombus seen within the peroneal vein at the area of calf tenderness.   RIGHT LOWER EXTREMITY: There is no evidence of " deep venous thrombus in the visualized portions of the common femoral vein, femoral vein, or popliteal vein. Respiratory variation and augmentation to calf pressure is noted. The visualized portion of the posterior tibial, and peroneal veins are unremarkable.       1. Partial thrombus within the right peroneal vein at the area of patient tenderness. Remainder of the visualized veins unremarkable.   Signed by: Олег King 2/11/2025 1:12 PM Dictation workstation:   [id number]    CT abdomen pelvis wo IV contrast    Result Date: 2/10/2025  Interpreted By:  Carson Welsh, STUDY: CT ABDOMEN PELVIS WO IV CONTRAST;  2/10/2025 9:49 pm   INDICATION: Signs/Symptoms:lower abdominal pain, vomiting.   COMPARISON: 01/28/2024   ACCESSION NUMBER(S): [id number]   ORDERING CLINICIAN: COLTEN MARTINEZ   TECHNIQUE: Contiguous axial images of the abdomen and pelvis were obtained without iodinated contrast. Coronal and sagittal reformatted images were reconstructed from the axial data.   FINDINGS: Scattered hepatic cysts are redemonstrated. Status post cholecystectomy.   Adrenals, pancreas and spleen are unremarkable.   Kidneys intact without hydronephrosis or hydroureter. Bladder within normal limits. There is a nonobstructing 3 mm calculus left kidney lower pole.   No bowel obstruction. No appendicitis.   There is moderate diverticulosis of the sigmoid colon. There is some very minor adjacent fat stranding seen adjacent to the distal descending colon which could reflect colitis and/or diverticulitis in the appropriate clinical setting.   Moderate calcification aorta. No aneurysm.   Uterus within normal limits.   No free fluid or significant adenopathy.   Multilevel moderate degenerative disc disease and facet arthropathy seen throughout the lower thoracic and lumbar spine.       Nonobstructing left renal calculus without hydronephrosis or hydroureter.   Minor fat stranding adjacent to the distal descending colon. Correlate with  potential early colitis.   No other definite acute process abdomen or pelvis on noncontrast imaging.   MACRO: None.   Signed by: Carson Welsh 2/10/2025 11:22 PM Dictation workstation:   [id number]    XR chest 1 view    Result Date: 1/18/2025  Interpreted By:  Sid Kam, STUDY: XR CHEST 1 VIEW;  1/18/2025 5:23 pm   INDICATION: Signs/Symptoms:cough.   COMPARISON: 9/6/2024   ACCESSION NUMBER(S): [id number]   ORDERING CLINICIAN: CHRISTIANO DARLING   FINDINGS: The cardiac silhouette is stable in size. There is mild atelectasis in the left lung. No segmental effusion. No pneumothorax.       Mild subsegmental atelectasis in the left lateral lung.   MACRO: None   Signed by: Sid Kam 1/18/2025 6:12 PM Dictation workstation:   [id number]     Assessment/Plan   Urinary tract infection-culture grew MRSA-blood cultures x 3 days negative  Resolved leukocytosis  Resolving acute kidney injury      IV vancomycin  Monitor renal function   Monitor temperature and WBC  Follow-up blood cultures  Supportive care  Long term plan 7 days IV antibiotics-till 2/19/2025-IV vancomycin verses IV daptomycin-avoid bactrim due to recent NATACHA, avoid zyvox d/t interaction with other medications could increase hypotension, increased risk of bleeding -resides in assisted living     Total time spent caring for the patient today was 35 minutes. This includes time spent before the visit reviewing the chart, time spent during the visit, and time spent after the visit on documentation.     Shabana Crabtree, APRN-CNP

## 2025-02-15 ENCOUNTER — HOSPITAL ENCOUNTER (INPATIENT)
Facility: HOSPITAL | Age: 89
End: 2025-02-15
Attending: INTERNAL MEDICINE | Admitting: INTERNAL MEDICINE
Payer: MEDICARE

## 2025-02-15 VITALS
HEIGHT: 60 IN | OXYGEN SATURATION: 97 % | HEART RATE: 71 BPM | WEIGHT: 171.08 LBS | SYSTOLIC BLOOD PRESSURE: 127 MMHG | DIASTOLIC BLOOD PRESSURE: 67 MMHG | RESPIRATION RATE: 16 BRPM | BODY MASS INDEX: 33.59 KG/M2 | TEMPERATURE: 97.7 F

## 2025-02-15 DIAGNOSIS — N39.0 URINARY TRACT INFECTION IN ELDERLY PATIENT: ICD-10-CM

## 2025-02-15 DIAGNOSIS — I82.451 ACUTE DEEP VEIN THROMBOSIS (DVT) OF RIGHT PERONEAL VEIN (MULTI): ICD-10-CM

## 2025-02-15 DIAGNOSIS — K57.92 DIVERTICULITIS: Primary | ICD-10-CM

## 2025-02-15 PROBLEM — A49.02 MRSA INFECTION: Status: ACTIVE | Noted: 2025-02-15

## 2025-02-15 LAB
ANION GAP SERPL CALC-SCNC: 9 MMOL/L (ref 10–20)
BACTERIA BLD CULT: NORMAL
BACTERIA BLD CULT: NORMAL
BUN SERPL-MCNC: 14 MG/DL (ref 6–23)
CALCIUM SERPL-MCNC: 8.8 MG/DL (ref 8.6–10.3)
CHLORIDE SERPL-SCNC: 109 MMOL/L (ref 98–107)
CO2 SERPL-SCNC: 24 MMOL/L (ref 21–32)
CREAT SERPL-MCNC: 0.92 MG/DL (ref 0.5–1.05)
EGFRCR SERPLBLD CKD-EPI 2021: 60 ML/MIN/1.73M*2
ERYTHROCYTE [DISTWIDTH] IN BLOOD BY AUTOMATED COUNT: 13.8 % (ref 11.5–14.5)
GLUCOSE SERPL-MCNC: 105 MG/DL (ref 74–99)
HCT VFR BLD AUTO: 30.5 % (ref 36–46)
HGB BLD-MCNC: 9.7 G/DL (ref 12–16)
MCH RBC QN AUTO: 29.5 PG (ref 26–34)
MCHC RBC AUTO-ENTMCNC: 31.8 G/DL (ref 32–36)
MCV RBC AUTO: 93 FL (ref 80–100)
NRBC BLD-RTO: 0 /100 WBCS (ref 0–0)
PLATELET # BLD AUTO: 213 X10*3/UL (ref 150–450)
POTASSIUM SERPL-SCNC: 3.4 MMOL/L (ref 3.5–5.3)
RBC # BLD AUTO: 3.29 X10*6/UL (ref 4–5.2)
SODIUM SERPL-SCNC: 139 MMOL/L (ref 136–145)
WBC # BLD AUTO: 6.7 X10*3/UL (ref 4.4–11.3)

## 2025-02-15 PROCEDURE — 85027 COMPLETE CBC AUTOMATED: CPT | Mod: IPSPLIT | Performed by: NURSE PRACTITIONER

## 2025-02-15 PROCEDURE — 2500000002 HC RX 250 W HCPCS SELF ADMINISTERED DRUGS (ALT 637 FOR MEDICARE OP, ALT 636 FOR OP/ED): Mod: IPSPLIT | Performed by: NURSE PRACTITIONER

## 2025-02-15 PROCEDURE — 80048 BASIC METABOLIC PNL TOTAL CA: CPT | Mod: IPSPLIT | Performed by: NURSE PRACTITIONER

## 2025-02-15 PROCEDURE — 2500000001 HC RX 250 WO HCPCS SELF ADMINISTERED DRUGS (ALT 637 FOR MEDICARE OP): Mod: IPSPLIT | Performed by: NURSE PRACTITIONER

## 2025-02-15 PROCEDURE — 1900000001 HC SKILLED SWING ROOM

## 2025-02-15 PROCEDURE — 94760 N-INVAS EAR/PLS OXIMETRY 1: CPT

## 2025-02-15 PROCEDURE — 2500000001 HC RX 250 WO HCPCS SELF ADMINISTERED DRUGS (ALT 637 FOR MEDICARE OP): Performed by: NURSE PRACTITIONER

## 2025-02-15 PROCEDURE — 94760 N-INVAS EAR/PLS OXIMETRY 1: CPT | Mod: IPSPLIT

## 2025-02-15 PROCEDURE — 2500000004 HC RX 250 GENERAL PHARMACY W/ HCPCS (ALT 636 FOR OP/ED): Performed by: NURSE PRACTITIONER

## 2025-02-15 PROCEDURE — 2500000002 HC RX 250 W HCPCS SELF ADMINISTERED DRUGS (ALT 637 FOR MEDICARE OP, ALT 636 FOR OP/ED): Performed by: NURSE PRACTITIONER

## 2025-02-15 PROCEDURE — 99239 HOSP IP/OBS DSCHRG MGMT >30: CPT | Performed by: NURSE PRACTITIONER

## 2025-02-15 PROCEDURE — 36415 COLL VENOUS BLD VENIPUNCTURE: CPT | Mod: IPSPLIT | Performed by: NURSE PRACTITIONER

## 2025-02-15 RX ORDER — CETIRIZINE HYDROCHLORIDE 10 MG/1
10 TABLET ORAL DAILY
Status: DISCONTINUED | OUTPATIENT
Start: 2025-02-16 | End: 2025-02-20 | Stop reason: HOSPADM

## 2025-02-15 RX ORDER — IBUPROFEN 400 MG/1
400 TABLET ORAL EVERY 6 HOURS PRN
Status: DISCONTINUED | OUTPATIENT
Start: 2025-02-15 | End: 2025-02-20 | Stop reason: HOSPADM

## 2025-02-15 RX ORDER — ACETAMINOPHEN 500 MG
10 TABLET ORAL NIGHTLY
Status: DISCONTINUED | OUTPATIENT
Start: 2025-02-15 | End: 2025-02-20 | Stop reason: HOSPADM

## 2025-02-15 RX ORDER — MORPHINE SULFATE 2 MG/ML
2 INJECTION, SOLUTION INTRAMUSCULAR; INTRAVENOUS EVERY 4 HOURS PRN
Status: CANCELLED | OUTPATIENT
Start: 2025-02-15

## 2025-02-15 RX ORDER — PANTOPRAZOLE SODIUM 40 MG/1
40 TABLET, DELAYED RELEASE ORAL
Status: DISCONTINUED | OUTPATIENT
Start: 2025-02-16 | End: 2025-02-20 | Stop reason: HOSPADM

## 2025-02-15 RX ORDER — FLUTICASONE PROPIONATE 50 MCG
1 SPRAY, SUSPENSION (ML) NASAL DAILY PRN
Status: CANCELLED | OUTPATIENT
Start: 2025-02-15

## 2025-02-15 RX ORDER — OXYBUTYNIN CHLORIDE 5 MG/1
5 TABLET ORAL 2 TIMES DAILY
Status: CANCELLED | OUTPATIENT
Start: 2025-02-15

## 2025-02-15 RX ORDER — METOPROLOL TARTRATE 25 MG/1
25 TABLET, FILM COATED ORAL 2 TIMES DAILY
Status: CANCELLED | OUTPATIENT
Start: 2025-02-15

## 2025-02-15 RX ORDER — OXYCODONE HYDROCHLORIDE 5 MG/1
5 TABLET ORAL EVERY 6 HOURS PRN
Status: CANCELLED | OUTPATIENT
Start: 2025-02-15

## 2025-02-15 RX ORDER — OXYCODONE HYDROCHLORIDE 5 MG/1
5 TABLET ORAL EVERY 6 HOURS PRN
Status: DISCONTINUED | OUTPATIENT
Start: 2025-02-15 | End: 2025-02-20 | Stop reason: HOSPADM

## 2025-02-15 RX ORDER — MORPHINE SULFATE 2 MG/ML
1 INJECTION, SOLUTION INTRAMUSCULAR; INTRAVENOUS EVERY 4 HOURS PRN
Status: CANCELLED | OUTPATIENT
Start: 2025-02-15

## 2025-02-15 RX ORDER — IBUPROFEN 400 MG/1
400 TABLET ORAL EVERY 6 HOURS PRN
Status: CANCELLED | OUTPATIENT
Start: 2025-02-15

## 2025-02-15 RX ORDER — LANOLIN ALCOHOL/MO/W.PET/CERES
50 CREAM (GRAM) TOPICAL 2 TIMES DAILY
Status: DISCONTINUED | OUTPATIENT
Start: 2025-02-15 | End: 2025-02-20 | Stop reason: HOSPADM

## 2025-02-15 RX ORDER — METOPROLOL TARTRATE 25 MG/1
25 TABLET, FILM COATED ORAL 2 TIMES DAILY
Status: DISCONTINUED | OUTPATIENT
Start: 2025-02-15 | End: 2025-02-20 | Stop reason: HOSPADM

## 2025-02-15 RX ORDER — ACETAMINOPHEN 325 MG/1
650 TABLET ORAL EVERY 6 HOURS PRN
Status: CANCELLED | OUTPATIENT
Start: 2025-02-15

## 2025-02-15 RX ORDER — OXYBUTYNIN CHLORIDE 5 MG/1
5 TABLET ORAL 2 TIMES DAILY
Status: DISCONTINUED | OUTPATIENT
Start: 2025-02-15 | End: 2025-02-20 | Stop reason: HOSPADM

## 2025-02-15 RX ORDER — ACETAMINOPHEN 325 MG/1
650 TABLET ORAL EVERY 6 HOURS PRN
Status: DISCONTINUED | OUTPATIENT
Start: 2025-02-15 | End: 2025-02-20 | Stop reason: HOSPADM

## 2025-02-15 RX ORDER — MORPHINE SULFATE 2 MG/ML
2 INJECTION, SOLUTION INTRAMUSCULAR; INTRAVENOUS EVERY 4 HOURS PRN
Status: DISCONTINUED | OUTPATIENT
Start: 2025-02-15 | End: 2025-02-17

## 2025-02-15 RX ORDER — FUROSEMIDE 40 MG/1
40 TABLET ORAL DAILY
Status: CANCELLED | OUTPATIENT
Start: 2025-02-16

## 2025-02-15 RX ORDER — OXYCODONE HYDROCHLORIDE 5 MG/1
10 TABLET ORAL EVERY 6 HOURS PRN
Status: CANCELLED | OUTPATIENT
Start: 2025-02-15

## 2025-02-15 RX ORDER — CALCIUM CARBONATE 200(500)MG
500 TABLET,CHEWABLE ORAL 4 TIMES DAILY PRN
Status: CANCELLED | OUTPATIENT
Start: 2025-02-15

## 2025-02-15 RX ORDER — ALUMINUM HYDROXIDE, MAGNESIUM HYDROXIDE, AND SIMETHICONE 1200; 120; 1200 MG/30ML; MG/30ML; MG/30ML
20 SUSPENSION ORAL 4 TIMES DAILY PRN
Status: DISCONTINUED | OUTPATIENT
Start: 2025-02-15 | End: 2025-02-20 | Stop reason: HOSPADM

## 2025-02-15 RX ORDER — PANTOPRAZOLE SODIUM 40 MG/1
40 TABLET, DELAYED RELEASE ORAL
Status: CANCELLED | OUTPATIENT
Start: 2025-02-16

## 2025-02-15 RX ORDER — ONDANSETRON HYDROCHLORIDE 2 MG/ML
4 INJECTION, SOLUTION INTRAVENOUS EVERY 4 HOURS PRN
Status: DISCONTINUED | OUTPATIENT
Start: 2025-02-15 | End: 2025-02-20 | Stop reason: HOSPADM

## 2025-02-15 RX ORDER — ENALAPRIL MALEATE 5 MG/1
20 TABLET ORAL DAILY
Status: DISCONTINUED | OUTPATIENT
Start: 2025-02-16 | End: 2025-02-20 | Stop reason: HOSPADM

## 2025-02-15 RX ORDER — FLUTICASONE PROPIONATE 50 MCG
1 SPRAY, SUSPENSION (ML) NASAL DAILY PRN
Status: DISCONTINUED | OUTPATIENT
Start: 2025-02-15 | End: 2025-02-20 | Stop reason: HOSPADM

## 2025-02-15 RX ORDER — VANCOMYCIN 1.75 G/350ML
1250 INJECTION, SOLUTION INTRAVENOUS EVERY 24 HOURS
Status: CANCELLED | OUTPATIENT
Start: 2025-02-15

## 2025-02-15 RX ORDER — VANCOMYCIN HYDROCHLORIDE 1 G/20ML
INJECTION, POWDER, LYOPHILIZED, FOR SOLUTION INTRAVENOUS DAILY PRN
Status: CANCELLED | OUTPATIENT
Start: 2025-02-15

## 2025-02-15 RX ORDER — VANCOMYCIN 1.75 G/350ML
1250 INJECTION, SOLUTION INTRAVENOUS EVERY 24 HOURS
Status: DISCONTINUED | OUTPATIENT
Start: 2025-02-15 | End: 2025-02-20 | Stop reason: HOSPADM

## 2025-02-15 RX ORDER — CALCIUM CARBONATE 200(500)MG
500 TABLET,CHEWABLE ORAL 4 TIMES DAILY PRN
Status: DISCONTINUED | OUTPATIENT
Start: 2025-02-15 | End: 2025-02-20 | Stop reason: HOSPADM

## 2025-02-15 RX ORDER — CETIRIZINE HYDROCHLORIDE 10 MG/1
10 TABLET ORAL DAILY
Status: CANCELLED | OUTPATIENT
Start: 2025-02-16

## 2025-02-15 RX ORDER — ACETAMINOPHEN 500 MG
10 TABLET ORAL NIGHTLY
Status: CANCELLED | OUTPATIENT
Start: 2025-02-15

## 2025-02-15 RX ORDER — OXYCODONE HYDROCHLORIDE 5 MG/1
10 TABLET ORAL EVERY 6 HOURS PRN
Status: DISCONTINUED | OUTPATIENT
Start: 2025-02-15 | End: 2025-02-20 | Stop reason: HOSPADM

## 2025-02-15 RX ORDER — ONDANSETRON HYDROCHLORIDE 2 MG/ML
4 INJECTION, SOLUTION INTRAVENOUS EVERY 4 HOURS PRN
Status: CANCELLED | OUTPATIENT
Start: 2025-02-15

## 2025-02-15 RX ORDER — MORPHINE SULFATE 2 MG/ML
1 INJECTION, SOLUTION INTRAMUSCULAR; INTRAVENOUS EVERY 4 HOURS PRN
Status: DISCONTINUED | OUTPATIENT
Start: 2025-02-15 | End: 2025-02-20 | Stop reason: HOSPADM

## 2025-02-15 RX ORDER — FUROSEMIDE 40 MG/1
40 TABLET ORAL DAILY
Status: DISCONTINUED | OUTPATIENT
Start: 2025-02-16 | End: 2025-02-20 | Stop reason: HOSPADM

## 2025-02-15 RX ORDER — VANCOMYCIN HYDROCHLORIDE 1 G/20ML
INJECTION, POWDER, LYOPHILIZED, FOR SOLUTION INTRAVENOUS DAILY PRN
Status: DISCONTINUED | OUTPATIENT
Start: 2025-02-15 | End: 2025-02-20 | Stop reason: HOSPADM

## 2025-02-15 RX ORDER — ALUMINUM HYDROXIDE, MAGNESIUM HYDROXIDE, AND SIMETHICONE 1200; 120; 1200 MG/30ML; MG/30ML; MG/30ML
20 SUSPENSION ORAL 4 TIMES DAILY PRN
Status: CANCELLED | OUTPATIENT
Start: 2025-02-15

## 2025-02-15 RX ORDER — ENALAPRIL MALEATE 5 MG/1
20 TABLET ORAL DAILY
Status: CANCELLED | OUTPATIENT
Start: 2025-02-16

## 2025-02-15 RX ADMIN — APIXABAN 10 MG: 5 TABLET, FILM COATED ORAL at 21:00

## 2025-02-15 RX ADMIN — APIXABAN 10 MG: 5 TABLET, FILM COATED ORAL at 08:47

## 2025-02-15 RX ADMIN — ENALAPRIL MALEATE 20 MG: 5 TABLET ORAL at 08:48

## 2025-02-15 RX ADMIN — METOPROLOL TARTRATE 25 MG: 25 TABLET, FILM COATED ORAL at 08:48

## 2025-02-15 RX ADMIN — FUROSEMIDE 40 MG: 40 TABLET ORAL at 08:48

## 2025-02-15 RX ADMIN — METOPROLOL TARTRATE 25 MG: 25 TABLET, FILM COATED ORAL at 21:01

## 2025-02-15 RX ADMIN — OXYBUTYNIN CHLORIDE 5 MG: 5 TABLET ORAL at 21:00

## 2025-02-15 RX ADMIN — VANCOMYCIN 1250 MG: 1.75 INJECTION, SOLUTION INTRAVENOUS at 17:45

## 2025-02-15 RX ADMIN — OXYBUTYNIN CHLORIDE 5 MG: 5 TABLET ORAL at 08:47

## 2025-02-15 RX ADMIN — PYRIDOXINE HCL TAB 50 MG 50 MG: 50 TAB at 21:00

## 2025-02-15 RX ADMIN — Medication 10 MG: at 21:01

## 2025-02-15 RX ADMIN — CETIRIZINE HYDROCHLORIDE 10 MG: 10 TABLET, FILM COATED ORAL at 08:48

## 2025-02-15 RX ADMIN — PANTOPRAZOLE SODIUM 40 MG: 40 TABLET, DELAYED RELEASE ORAL at 06:29

## 2025-02-15 SDOH — SOCIAL STABILITY: SOCIAL INSECURITY: ARE YOU OR HAVE YOU BEEN THREATENED OR ABUSED PHYSICALLY, EMOTIONALLY, OR SEXUALLY BY ANYONE?: NO

## 2025-02-15 SDOH — SOCIAL STABILITY: SOCIAL INSECURITY: WITHIN THE LAST YEAR, HAVE YOU BEEN HUMILIATED OR EMOTIONALLY ABUSED IN OTHER WAYS BY YOUR PARTNER OR EX-PARTNER?: NO

## 2025-02-15 SDOH — SOCIAL STABILITY: SOCIAL INSECURITY: WITHIN THE LAST YEAR, HAVE YOU BEEN AFRAID OF YOUR PARTNER OR EX-PARTNER?: NO

## 2025-02-15 SDOH — SOCIAL STABILITY: SOCIAL INSECURITY: HAVE YOU HAD THOUGHTS OF HARMING ANYONE ELSE?: NO

## 2025-02-15 SDOH — ECONOMIC STABILITY: FOOD INSECURITY: WITHIN THE PAST 12 MONTHS, THE FOOD YOU BOUGHT JUST DIDN'T LAST AND YOU DIDN'T HAVE MONEY TO GET MORE.: NEVER TRUE

## 2025-02-15 SDOH — SOCIAL STABILITY: SOCIAL INSECURITY: ABUSE: ADULT

## 2025-02-15 SDOH — SOCIAL STABILITY: SOCIAL INSECURITY: WERE YOU ABLE TO COMPLETE ALL THE BEHAVIORAL HEALTH SCREENINGS?: YES

## 2025-02-15 SDOH — ECONOMIC STABILITY: FOOD INSECURITY: WITHIN THE PAST 12 MONTHS, YOU WORRIED THAT YOUR FOOD WOULD RUN OUT BEFORE YOU GOT THE MONEY TO BUY MORE.: NEVER TRUE

## 2025-02-15 SDOH — SOCIAL STABILITY: SOCIAL INSECURITY: ARE THERE ANY APPARENT SIGNS OF INJURIES/BEHAVIORS THAT COULD BE RELATED TO ABUSE/NEGLECT?: NO

## 2025-02-15 SDOH — SOCIAL STABILITY: SOCIAL INSECURITY: DO YOU FEEL UNSAFE GOING BACK TO THE PLACE WHERE YOU ARE LIVING?: NO

## 2025-02-15 SDOH — SOCIAL STABILITY: SOCIAL INSECURITY: DO YOU FEEL ANYONE HAS EXPLOITED OR TAKEN ADVANTAGE OF YOU FINANCIALLY OR OF YOUR PERSONAL PROPERTY?: NO

## 2025-02-15 SDOH — SOCIAL STABILITY: SOCIAL INSECURITY: HAS ANYONE EVER THREATENED TO HURT YOUR FAMILY OR YOUR PETS?: NO

## 2025-02-15 SDOH — ECONOMIC STABILITY: INCOME INSECURITY: IN THE PAST 12 MONTHS HAS THE ELECTRIC, GAS, OIL, OR WATER COMPANY THREATENED TO SHUT OFF SERVICES IN YOUR HOME?: NO

## 2025-02-15 SDOH — SOCIAL STABILITY: SOCIAL INSECURITY: DOES ANYONE TRY TO KEEP YOU FROM HAVING/CONTACTING OTHER FRIENDS OR DOING THINGS OUTSIDE YOUR HOME?: NO

## 2025-02-15 SDOH — SOCIAL STABILITY: SOCIAL INSECURITY: HAVE YOU HAD ANY THOUGHTS OF HARMING ANYONE ELSE?: NO

## 2025-02-15 ASSESSMENT — COGNITIVE AND FUNCTIONAL STATUS - GENERAL
DAILY ACTIVITIY SCORE: 24
PATIENT BASELINE BEDBOUND: NO
DAILY ACTIVITIY SCORE: 24
MOBILITY SCORE: 24
MOBILITY SCORE: 24

## 2025-02-15 ASSESSMENT — ACTIVITIES OF DAILY LIVING (ADL)
TOILETING: INDEPENDENT
LACK_OF_TRANSPORTATION: NO
BATHING: NEEDS ASSISTANCE
PATIENT'S MEMORY ADEQUATE TO SAFELY COMPLETE DAILY ACTIVITIES?: YES
HEARING - LEFT EAR: FUNCTIONAL
FEEDING YOURSELF: INDEPENDENT
DRESSING YOURSELF: INDEPENDENT
ADEQUATE_TO_COMPLETE_ADL: YES
JUDGMENT_ADEQUATE_SAFELY_COMPLETE_DAILY_ACTIVITIES: YES
ASSISTIVE_DEVICE: WALKER
HEARING - RIGHT EAR: FUNCTIONAL
WALKS IN HOME: INDEPENDENT
GROOMING: INDEPENDENT

## 2025-02-15 ASSESSMENT — LIFESTYLE VARIABLES
HOW OFTEN DO YOU HAVE A DRINK CONTAINING ALCOHOL: NEVER
SKIP TO QUESTIONS 9-10: 1
AUDIT-C TOTAL SCORE: 0
HOW MANY STANDARD DRINKS CONTAINING ALCOHOL DO YOU HAVE ON A TYPICAL DAY: PATIENT DOES NOT DRINK
HOW OFTEN DO YOU HAVE 6 OR MORE DRINKS ON ONE OCCASION: NEVER
AUDIT-C TOTAL SCORE: 0

## 2025-02-15 ASSESSMENT — PAIN SCALES - GENERAL
PAINLEVEL_OUTOF10: 0 - NO PAIN

## 2025-02-15 ASSESSMENT — PAIN - FUNCTIONAL ASSESSMENT: PAIN_FUNCTIONAL_ASSESSMENT: 0-10

## 2025-02-15 NOTE — PROGRESS NOTES
Vancomycin Dosing by Pharmacy- FOLLOW UP    Vanessa Adame is a 89 y.o. year old female who Pharmacy has been consulted for vancomycin dosing for UTI - uncomplicated. Based on the patient's indication and renal status this patient is being dosed based on a goal AUC of 400-600.     Renal function is currently improving.    Current vancomycin dose: 1250 mg given every 24 hours    Estimated vancomycin AUC on current dose: 561 mg/L.hr     Visit Vitals  /67 (BP Location: Right arm)   Pulse 69   Temp 36.5 °C (97.7 °F) (Temporal)   Resp 16        Lab Results   Component Value Date    CREATININE 0.92 02/15/2025    CREATININE 1.03 2025    CREATININE 1.02 2025    CREATININE 1.44 (H) 2025        Patient weight is as follows:   Vitals:    25 0041   Weight: 77.6 kg (171 lb 1.2 oz)       Cultures:  Susceptibility data for the encounter in last 14 days.  Collected Specimen Info Organism Nitrofurantoin Oxacillin Trimethoprim/Sulfamethoxazole Vancomycin   02/10/25 Urine from Straight Catheter Methicillin Resistant Staphylococcus aureus (MRSA)  S  R  S  S        I/O last 3 completed shifts:  In: 490 (6.3 mL/kg) [P.O.:240; IV Piggyback:250]  Out: - (0 mL/kg)   Weight: 77.6 kg   I/O during current shift:  No intake/output data recorded.    Temp (24hrs), Av.5 °C (97.7 °F), Min:36.5 °C (97.7 °F), Max:36.5 °C (97.7 °F)      Assessment/Plan    Within goal AUC range. Continue current vancomycin regimen.    This dosing regimen is predicted by [employer] to result in the following pharmacokinetic parameters:  Regimen: 1250 mg IV every 24 hours.  Start time: 17:27 on 02/15/2025  Exposure target: AUC24 (range)400-600 mg/L.hr   [id number]: 513 mg/L.hr  AUC24,ss: 561 mg/L.hr  Probability of AUC24 > 400: 94 %  Ctrough,ss: 16.8 mg/L  Probability of Ctrough,ss > 20: 29 %    The next level will be obtained on 25 at 0500. May be obtained sooner if clinically indicated.   Will continue to monitor renal function  daily while on vancomycin and order serum creatinine at least every 48 hours if not already ordered.  Follow for continued vancomycin needs, clinical response, and signs/symptoms of toxicity.       Saqib Hui, PharmD

## 2025-02-15 NOTE — CARE PLAN
The patient's goals for the shift include      The clinical goals for the shift include Patient will have no episodes of nausea, vomitting or diarrhea this shift    Patient slept on and off throughout the shift. She believed several times that there were people other than staff walking in and out of her room. VSS

## 2025-02-15 NOTE — CARE PLAN
The patient's goals for the shift include      The clinical goals for the shift include Patient will continue to tolerate IV ATB this shift    Patient is discharging to [facility] unit.

## 2025-02-15 NOTE — PROGRESS NOTES
02/14/25 2251   Discharge Planning   Living Arrangements Children   Support Systems Children   Assistance Needed Pt will swing with IV meds   Type of Residence Skilled nursing facility   Who is requesting discharge planning? Patient   Home or Post Acute Services Community services   Expected Discharge Disposition SNF   Does the patient need discharge transport arranged? No   Patient Choice   Provider Choice list and CMS website (https://medicare.gov/care-compare#search) for post-acute Quality and Resource Measure Data were provided and reviewed with: Patient   Patient / Family choosing to utilize agency / facility established prior to hospitalization No   Stroke Family Assessment   Stroke Family Assessment Needed No     Care Transition Progress Note 2/14/25    Patient: Vanessa Adame    Diagnosis: Diverticulus    Patient Concerns: Pt reports that she hurts still and agrees to leave for     Patient Needs: Pt will need to go to [facility] once ready.     Care Transition Social Worker explained discharge process today.

## 2025-02-15 NOTE — DISCHARGE SUMMARY
Discharge Diagnosis  Diverticulitis, Acute  Acute DVT RLE  Acute kidney injury  Acute cystitis without hematuria  Dehydration  MRSA infection    Issues Requiring Follow-Up      Discharge Meds     Medication List      ASK your doctor about these medications     acetaminophen 325 mg tablet; Commonly known as: Tylenol   dexAMETHasone 6 mg tablet; Commonly known as: Decadron; Take 1 tablet (6   mg) by mouth once daily for 8 doses.   enalapril 20 mg tablet; Commonly known as: Vasotec   fluticasone 50 mcg/actuation nasal spray; Commonly known as: Flonase   furosemide 40 mg tablet; Commonly known as: Lasix   ibuprofen 200 mg tablet   loperamide 2 mg tablet; Commonly known as: Imodium A-D   loratadine 10 mg tablet; Commonly known as: Claritin   metoprolol tartrate 25 mg tablet; Commonly known as: Lopressor   ondansetron ODT 4 mg disintegrating tablet; Commonly known as:   Zofran-ODT; Dissolve 1 tablet (4 mg) in the mouth every 8 hours if needed   for nausea or vomiting.   oxybutynin 5 mg tablet; Commonly known as: Ditropan   pantoprazole 40 mg EC tablet; Commonly known as: ProtoNix; Take 1 tablet   (40 mg) by mouth once daily in the morning. Take before meals. Do not   crush, chew, or split.   potassium chloride CR 20 mEq ER tablet; Commonly known as: Klor-Con M20   pyridoxine 50 mg tablet; Commonly known as: Vitamin B-6       Test Results Pending At Discharge  Pending Labs       No current pending labs.            Hospital Course    Acute Diverticulitis, resolved  - WBC 15.2  - CT A/P: Nonobstructing left renal calculus without hydronephrosis or hydroureter.   Minor fat stranding adjacent to the distal descending colon. Correlate with potential early colitis.   - discontinue ciprofloxacin 500 mg daily, flagyl 500 mg q8h; day 3  - pain relief as needed  - Blood culture negative to date  - tolerating  diet to regular     Acute DVT RLE  - US BLE: . Partial thrombus within the right peroneal vein at the area of  patient  tenderness.   - discontinued heparin drip  - continue apixaban 10 mg BID x 7 days then 5 mg BID     NATACHA  Acute Cystitis without hematuria  OAB (overactive bladder)  - creat 1.56 > 1.44  - GFR 32 > 35  - UA: LARGE LE, +WBC, 2+ BACTERIA  - Urine culture grew MRSA  - continue Vancomycin 1,250 mg daily; day 2/7  - ID consult  - avoid nephrotoxins, renally dose meds as able  - follow BMP  - continue oxybutynin 5 mg BID     Dehydration, resolved  Nausea and vomiting, resolved  - discontinued IVF   - continue zofran  4 mg q8h, prn  - regular diet     Essential HTN (hypertension)  Chronic Diastolic Heart failure  Elevated D dimer  -dimer 3864  -normal LVSF  with an EF of 60-65% with impaired relaxation pattern LVDF  - not hypoxic, not tachy, not hypotensive  - continue enalapril 20 mg daily, metoprolol tartrate 25 mg BID  - monitor BP     GI ppx:  - continue pantoprazole 40 mg daily     DVT ppx:   -  discontinue heparin drip  - started apixaban 10 mg BID x 7 day then 5 mg BID     Code Status: Full     Disposition: Patient is stable to be discharged to [facility]    Total cumulative time spent in preparation of this discharge including documentation review, coordination of care with the medical team including PT/SW/care coordinators and treating consultants, discussion with patient and pertinent family members and finalization of prescriptions, follow-up appointments, and this discharge summary was approximately 45 minutes.     Pertinent Physical Exam At Time of Discharge  Physical Exam  Vitals reviewed.   Constitutional:       Appearance: Normal appearance. She is obese.   HENT:      Head: Normocephalic and atraumatic.      Right Ear: External ear normal.      Left Ear: External ear normal.      Nose: Nose normal.      Mouth/Throat:      Mouth: Mucous membranes are moist.      Pharynx: Oropharynx is clear.   Eyes:      Conjunctiva/sclera: Conjunctivae normal.      Pupils: Pupils are equal, round, and reactive to light.    Cardiovascular:      Rate and Rhythm: Normal rate and regular rhythm.      Pulses: Normal pulses.      Heart sounds: Normal heart sounds.   Pulmonary:      Effort: Pulmonary effort is normal.      Breath sounds: Normal breath sounds.   Abdominal:      General: Bowel sounds are normal.      Palpations: Abdomen is soft.   Musculoskeletal:         General: Normal range of motion.      Cervical back: Normal range of motion and neck supple.   Skin:     General: Skin is warm and dry.   Neurological:      General: No focal deficit present.      Mental Status: She is alert and oriented to person, place, and time.   Psychiatric:         Mood and Affect: Mood normal.         Behavior: Behavior normal.   Outpatient Follow-Up  No future appointments.      Qing Moser, APRN-CNP

## 2025-02-16 VITALS
HEIGHT: 60 IN | OXYGEN SATURATION: 96 % | DIASTOLIC BLOOD PRESSURE: 75 MMHG | SYSTOLIC BLOOD PRESSURE: 108 MMHG | HEART RATE: 68 BPM | BODY MASS INDEX: 34.19 KG/M2 | WEIGHT: 174.16 LBS | TEMPERATURE: 98 F | RESPIRATION RATE: 18 BRPM

## 2025-02-16 PROBLEM — I82.451 ACUTE DEEP VEIN THROMBOSIS (DVT) OF RIGHT PERONEAL VEIN (MULTI): Status: ACTIVE | Noted: 2025-02-16

## 2025-02-16 PROBLEM — I50.32 CHRONIC DIASTOLIC HEART FAILURE: Status: ACTIVE | Noted: 2025-02-16

## 2025-02-16 PROBLEM — N30.00 ACUTE CYSTITIS WITHOUT HEMATURIA: Status: ACTIVE | Noted: 2025-02-16

## 2025-02-16 PROBLEM — E87.6 HYPOKALEMIA: Status: ACTIVE | Noted: 2025-02-16

## 2025-02-16 LAB
ANION GAP SERPL CALC-SCNC: 7 MMOL/L (ref 10–20)
BUN SERPL-MCNC: 14 MG/DL (ref 6–23)
CALCIUM SERPL-MCNC: 8.9 MG/DL (ref 8.6–10.3)
CHLORIDE SERPL-SCNC: 104 MMOL/L (ref 98–107)
CO2 SERPL-SCNC: 27 MMOL/L (ref 21–32)
CREAT SERPL-MCNC: 0.84 MG/DL (ref 0.5–1.05)
EGFRCR SERPLBLD CKD-EPI 2021: 67 ML/MIN/1.73M*2
ERYTHROCYTE [DISTWIDTH] IN BLOOD BY AUTOMATED COUNT: 13.9 % (ref 11.5–14.5)
GLUCOSE SERPL-MCNC: 113 MG/DL (ref 74–99)
HCT VFR BLD AUTO: 29.9 % (ref 36–46)
HGB BLD-MCNC: 9.5 G/DL (ref 12–16)
MCH RBC QN AUTO: 28.7 PG (ref 26–34)
MCHC RBC AUTO-ENTMCNC: 31.8 G/DL (ref 32–36)
MCV RBC AUTO: 90 FL (ref 80–100)
NRBC BLD-RTO: 0 /100 WBCS (ref 0–0)
PLATELET # BLD AUTO: 228 X10*3/UL (ref 150–450)
POTASSIUM SERPL-SCNC: 3.1 MMOL/L (ref 3.5–5.3)
RBC # BLD AUTO: 3.31 X10*6/UL (ref 4–5.2)
SODIUM SERPL-SCNC: 135 MMOL/L (ref 136–145)
WBC # BLD AUTO: 6 X10*3/UL (ref 4.4–11.3)

## 2025-02-16 PROCEDURE — 36415 COLL VENOUS BLD VENIPUNCTURE: CPT | Performed by: NURSE PRACTITIONER

## 2025-02-16 PROCEDURE — 2500000004 HC RX 250 GENERAL PHARMACY W/ HCPCS (ALT 636 FOR OP/ED): Performed by: NURSE PRACTITIONER

## 2025-02-16 PROCEDURE — 2500000002 HC RX 250 W HCPCS SELF ADMINISTERED DRUGS (ALT 637 FOR MEDICARE OP, ALT 636 FOR OP/ED): Performed by: NURSE PRACTITIONER

## 2025-02-16 PROCEDURE — 94760 N-INVAS EAR/PLS OXIMETRY 1: CPT

## 2025-02-16 PROCEDURE — 82374 ASSAY BLOOD CARBON DIOXIDE: CPT | Performed by: NURSE PRACTITIONER

## 2025-02-16 PROCEDURE — 85027 COMPLETE CBC AUTOMATED: CPT | Performed by: NURSE PRACTITIONER

## 2025-02-16 PROCEDURE — 1900000001 HC SKILLED SWING ROOM

## 2025-02-16 PROCEDURE — 2500000001 HC RX 250 WO HCPCS SELF ADMINISTERED DRUGS (ALT 637 FOR MEDICARE OP): Performed by: NURSE PRACTITIONER

## 2025-02-16 PROCEDURE — 99306 1ST NF CARE HIGH MDM 50: CPT | Performed by: NURSE PRACTITIONER

## 2025-02-16 RX ORDER — POTASSIUM CHLORIDE 20 MEQ/1
40 TABLET, EXTENDED RELEASE ORAL ONCE
Status: COMPLETED | OUTPATIENT
Start: 2025-02-16 | End: 2025-02-16

## 2025-02-16 RX ADMIN — VANCOMYCIN 1250 MG: 1.75 INJECTION, SOLUTION INTRAVENOUS at 16:50

## 2025-02-16 RX ADMIN — APIXABAN 10 MG: 5 TABLET, FILM COATED ORAL at 20:32

## 2025-02-16 RX ADMIN — Medication 10 MG: at 20:32

## 2025-02-16 RX ADMIN — OXYBUTYNIN CHLORIDE 5 MG: 5 TABLET ORAL at 20:32

## 2025-02-16 RX ADMIN — METOPROLOL TARTRATE 25 MG: 25 TABLET, FILM COATED ORAL at 09:04

## 2025-02-16 RX ADMIN — ENALAPRIL MALEATE 20 MG: 5 TABLET ORAL at 09:04

## 2025-02-16 RX ADMIN — POTASSIUM CHLORIDE 40 MEQ: 1500 TABLET, EXTENDED RELEASE ORAL at 11:54

## 2025-02-16 RX ADMIN — CETIRIZINE HYDROCHLORIDE 10 MG: 10 TABLET, FILM COATED ORAL at 09:04

## 2025-02-16 RX ADMIN — OXYBUTYNIN CHLORIDE 5 MG: 5 TABLET ORAL at 09:04

## 2025-02-16 RX ADMIN — PYRIDOXINE HCL TAB 50 MG 50 MG: 50 TAB at 20:32

## 2025-02-16 RX ADMIN — FUROSEMIDE 40 MG: 40 TABLET ORAL at 09:04

## 2025-02-16 RX ADMIN — METOPROLOL TARTRATE 25 MG: 25 TABLET, FILM COATED ORAL at 20:32

## 2025-02-16 RX ADMIN — APIXABAN 10 MG: 5 TABLET, FILM COATED ORAL at 09:05

## 2025-02-16 RX ADMIN — PYRIDOXINE HCL TAB 50 MG 50 MG: 50 TAB at 09:04

## 2025-02-16 RX ADMIN — PANTOPRAZOLE SODIUM 40 MG: 40 TABLET, DELAYED RELEASE ORAL at 05:15

## 2025-02-16 ASSESSMENT — PAIN SCALES - GENERAL
PAINLEVEL_OUTOF10: 0 - NO PAIN
PAINLEVEL_OUTOF10: 0 - NO PAIN

## 2025-02-16 ASSESSMENT — COGNITIVE AND FUNCTIONAL STATUS - GENERAL: DAILY ACTIVITIY SCORE: 24

## 2025-02-16 ASSESSMENT — ENCOUNTER SYMPTOMS
MUSCULOSKELETAL NEGATIVE: 1
NEUROLOGICAL NEGATIVE: 1
RESPIRATORY NEGATIVE: 1
CARDIOVASCULAR NEGATIVE: 1
CONSTITUTIONAL NEGATIVE: 1
EYES NEGATIVE: 1
ENDOCRINE NEGATIVE: 1
PSYCHIATRIC NEGATIVE: 1
GASTROINTESTINAL NEGATIVE: 1

## 2025-02-16 NOTE — H&P
History Of Present Illness  Vanessa Adame is a 89 y.o. female presenting to [facility] skilled unit. She was recently admitted to [facility] inpatient for diverticulitis, acute DVT, and acute cystitis. Her urine grew MRSA requiring IV vancomycin for a total of 7 days. She was discharged and transferred to [facility] skilled Swing Bed program for the IV antibiotic therapy.     Past Medical History  Past Medical History:   Diagnosis Date    Anxiety     CHF (congestive heart failure)     Chronic osteoarthritis     Chronic renal disease, stage III (Multi)     Dementia with behavioral problem (Multi)     Diabetes mellitus (Multi)     Diverticulosis of intestine without perforation or abscess without bleeding     History of fall     Hyperlipemia     Hypertension     Hypokalemia     Lack of coordination     Overactive bladder     Senile dementia, uncomplicated (Multi)     Spinal stenosis     Unsteady gait     Weakness generalized        Surgical History  Past Surgical History:   Procedure Laterality Date    TOTAL KNEE ARTHROPLASTY Left         Social History  She reports that she has never smoked. She has never been exposed to tobacco smoke. She has never used smokeless tobacco. She reports that she does not drink alcohol and does not use drugs.    Family History  No family history on file.     Allergies  Amoxapine, Amoxicillin, Penicillin, and Sulfa (sulfonamide antibiotics)    Review of Systems   Constitutional: Negative.    HENT: Negative.     Eyes: Negative.    Respiratory: Negative.     Cardiovascular: Negative.    Gastrointestinal: Negative.    Endocrine: Negative.    Genitourinary: Negative.    Musculoskeletal: Negative.    Skin: Negative.    Neurological: Negative.    Psychiatric/Behavioral: Negative.          Physical Exam     Vitals reviewed.   Constitutional:       Appearance: Normal appearance. She is obese.   HENT:      Head: Normocephalic and atraumatic.      Right Ear: External ear normal.       Left Ear: External ear normal.      Nose: Nose normal.      Mouth/Throat:      Mouth: Mucous membranes are moist.      Pharynx: Oropharynx is clear.   Eyes:      Conjunctiva/sclera: Conjunctivae normal.      Pupils: Pupils are equal, round, and reactive to light.   Cardiovascular:      Rate and Rhythm: Normal rate and regular rhythm.      Pulses: Normal pulses.      Heart sounds: Normal heart sounds.   Pulmonary:      Effort: Pulmonary effort is normal.      Breath sounds: Normal breath sounds.   Abdominal:      General: Bowel sounds are normal.      Palpations: Abdomen is soft.   Musculoskeletal:         General: Normal range of motion.      Cervical back: Normal range of motion and neck supple.   Skin:     General: Skin is warm and dry.   Neurological:      General: No focal deficit present.      Mental Status: She is alert and oriented to person, place, and time.   Psychiatric:         Mood and Affect: Mood normal.         Behavior: Behavior normal.       Last Recorded Vitals  Blood pressure 118/67, pulse 67, temperature 36.2 °C (97.2 °F), temperature source Temporal, resp. rate 16, height 1.524 m (5'), weight 79 kg (174 lb 2.6 oz), SpO2 97%.    Relevant Results    Scheduled medications  apixaban, 10 mg, oral, BID   Followed by  [START ON 2/20/2025] apixaban, 5 mg, oral, BID  cetirizine, 10 mg, oral, Daily  enalapril, 20 mg, oral, Daily  furosemide, 40 mg, oral, Daily  melatonin, 10 mg, oral, Nightly  metoprolol tartrate, 25 mg, oral, BID  oxybutynin, 5 mg, oral, BID  pantoprazole, 40 mg, oral, Daily before breakfast  pyridoxine, 50 mg, oral, BID  vancomycin, 1,250 mg, intravenous, q24h      Continuous medications     PRN medications  PRN medications: acetaminophen, alum-mag hydroxide-simeth, benzocaine-menthol, calcium carbonate, fluticasone, ibuprofen, morphine, morphine, ondansetron, oxyCODONE, oxyCODONE, vancomycin             Assessment/Plan   Assessment & Plan  MRSA infection    Benign essential HTN    OAB  (overactive bladder)    Acute cystitis without hematuria    Acute deep vein thrombosis (DVT) of right peroneal vein (Multi)    Chronic diastolic heart failure    Hypokalemia         Acute DVT RLE  - US BLE: . Partial thrombus within the right peroneal vein at the area of  patient tenderness.   - continue apixaban 10 mg BID x 7 days then 5 mg BID       Acute Cystitis without hematuria  OAB (overactive bladder)  - UA: LARGE LE, +WBC, 2+ BACTERIA  - Urine culture grew MRSA  - continue Vancomycin 1,250 mg daily; day 4/7  - continue oxybutynin 5 mg BID    Hypokalemia  - K 3.1  - replaced with KCL 40 mEq  - monitor BmP     Essential HTN (hypertension)  Chronic Diastolic Heart failure  -normal LVSF  with an EF of 60-65% with impaired relaxation pattern LVDF  - not hypoxic, not tachy, not hypotensive  - continue enalapril 20 mg daily, metoprolol tartrate 25 mg BID  - monitor BP     GI ppx:  - continue pantoprazole 40 mg daily     DVT ppx:   - continue apixaban 10 mg BID x 7 day then 5 mg BID     Code Status: Full     Disposition: Patient requires IV antibiotics therapy              MARYANNE Carter-CNP  Attending Attestation:    Patient was seen and examined face to face, history and physical was taken personally at bedside the APRN-CNP, was present for the whole duration of the exam who participated in the documentation of this note. I performed the medical decision-making components (assessment and plan of care). I have reviewed the documentation and verified the findings in the note as written with additions or exceptions as stated in the body of this note.   Pleasant elderly lady who was admitted here for diverticulitis and urinary tract infection, culture showed MRSA,, she also had acute DVT, was started on a Paxipam, she needs to finish course of antibiotics treatment with vancomycin.  She is doing well she is eating better her abdominal pain resolved, no significant pain in the lower extremities.  On exam  heart regular, lungs clear to auscultation, abdomen soft bowel sounds are present, extremity no edema pedal pulses are present.  Continue with IV Vanco, continue with chronic oral anticoagulation, checking lab and CBCs, protein calorie diet and consult physical Occupational Therapy.    Dr. Mayuri Avina MD  Internal Medicine

## 2025-02-16 NOTE — CARE PLAN
The patient's goals for the shift include      The clinical goals for the shift include Patient will have a restful nights sleep.    Over the shift, the patient did make progress toward the following goals. Patient had  a restful nights sleep.

## 2025-02-16 NOTE — CARE PLAN
The patient's goals for the shift include      The clinical goals for the shift include Patient to tolerate medication both oral and IV, use call light as needed.    Patient was able to meet goals this shift. Patient was able to sit up in the recliner for breakfast only. Patient constantly requests to be in bed. Patient had company today and they read the bible together. Patient was so thankful for the visit. John, nurse supervisor has been kind enough to loan the patient his personal bible. Patient was grateful as this brings her comfort. Patient otherwise had a uneventful shift. Patient's appetite seemed better today, patient was fearful to eat much here due to her diverticulitis. Patient felt better after our conversation. Although she remains critical of the food here, so her portions are small. Personal items and call light are within reach.

## 2025-02-17 LAB
ANION GAP SERPL CALC-SCNC: 11 MMOL/L (ref 10–20)
BUN SERPL-MCNC: 17 MG/DL (ref 6–23)
CALCIUM SERPL-MCNC: 8.9 MG/DL (ref 8.6–10.3)
CHLORIDE SERPL-SCNC: 106 MMOL/L (ref 98–107)
CO2 SERPL-SCNC: 26 MMOL/L (ref 21–32)
CREAT SERPL-MCNC: 0.88 MG/DL (ref 0.5–1.05)
EGFRCR SERPLBLD CKD-EPI 2021: 63 ML/MIN/1.73M*2
GLUCOSE SERPL-MCNC: 110 MG/DL (ref 74–99)
HOLD SPECIMEN: NORMAL
POTASSIUM SERPL-SCNC: 3.7 MMOL/L (ref 3.5–5.3)
SODIUM SERPL-SCNC: 139 MMOL/L (ref 136–145)
VANCOMYCIN SERPL-MCNC: 19.9 UG/ML (ref 5–20)

## 2025-02-17 PROCEDURE — 1900000001 HC SKILLED SWING ROOM

## 2025-02-17 PROCEDURE — 80048 BASIC METABOLIC PNL TOTAL CA: CPT | Performed by: NURSE PRACTITIONER

## 2025-02-17 PROCEDURE — 80202 ASSAY OF VANCOMYCIN: CPT | Performed by: NURSE PRACTITIONER

## 2025-02-17 PROCEDURE — 2500000002 HC RX 250 W HCPCS SELF ADMINISTERED DRUGS (ALT 637 FOR MEDICARE OP, ALT 636 FOR OP/ED): Performed by: NURSE PRACTITIONER

## 2025-02-17 PROCEDURE — 2500000001 HC RX 250 WO HCPCS SELF ADMINISTERED DRUGS (ALT 637 FOR MEDICARE OP): Performed by: NURSE PRACTITIONER

## 2025-02-17 PROCEDURE — 94760 N-INVAS EAR/PLS OXIMETRY 1: CPT

## 2025-02-17 PROCEDURE — 2500000004 HC RX 250 GENERAL PHARMACY W/ HCPCS (ALT 636 FOR OP/ED): Performed by: NURSE PRACTITIONER

## 2025-02-17 PROCEDURE — 36415 COLL VENOUS BLD VENIPUNCTURE: CPT | Performed by: INTERNAL MEDICINE

## 2025-02-17 PROCEDURE — 36415 COLL VENOUS BLD VENIPUNCTURE: CPT | Performed by: NURSE PRACTITIONER

## 2025-02-17 RX ORDER — POLYETHYLENE GLYCOL 3350 17 G/17G
17 POWDER, FOR SOLUTION ORAL DAILY
Status: DISCONTINUED | OUTPATIENT
Start: 2025-02-17 | End: 2025-02-20 | Stop reason: HOSPADM

## 2025-02-17 RX ORDER — LACTULOSE 10 G/15ML
20 SOLUTION ORAL DAILY
Status: DISCONTINUED | OUTPATIENT
Start: 2025-02-17 | End: 2025-02-20 | Stop reason: HOSPADM

## 2025-02-17 RX ORDER — POTASSIUM CHLORIDE 20 MEQ/1
20 TABLET, EXTENDED RELEASE ORAL ONCE
Status: COMPLETED | OUTPATIENT
Start: 2025-02-17 | End: 2025-02-17

## 2025-02-17 RX ORDER — AMOXICILLIN 250 MG
2 CAPSULE ORAL 2 TIMES DAILY
Status: DISCONTINUED | OUTPATIENT
Start: 2025-02-17 | End: 2025-02-20 | Stop reason: HOSPADM

## 2025-02-17 RX ADMIN — FUROSEMIDE 40 MG: 40 TABLET ORAL at 09:07

## 2025-02-17 RX ADMIN — PYRIDOXINE HCL TAB 50 MG 50 MG: 50 TAB at 20:11

## 2025-02-17 RX ADMIN — ONDANSETRON 4 MG: 2 INJECTION INTRAMUSCULAR; INTRAVENOUS at 14:07

## 2025-02-17 RX ADMIN — ENALAPRIL MALEATE 20 MG: 5 TABLET ORAL at 09:08

## 2025-02-17 RX ADMIN — METOPROLOL TARTRATE 25 MG: 25 TABLET, FILM COATED ORAL at 20:11

## 2025-02-17 RX ADMIN — SENNOSIDES AND DOCUSATE SODIUM 2 TABLET: 8.6; 5 TABLET ORAL at 20:55

## 2025-02-17 RX ADMIN — VANCOMYCIN 1250 MG: 1.75 INJECTION, SOLUTION INTRAVENOUS at 15:59

## 2025-02-17 RX ADMIN — POTASSIUM CHLORIDE 20 MEQ: 1500 TABLET, EXTENDED RELEASE ORAL at 09:07

## 2025-02-17 RX ADMIN — APIXABAN 10 MG: 5 TABLET, FILM COATED ORAL at 20:08

## 2025-02-17 RX ADMIN — PYRIDOXINE HCL TAB 50 MG 50 MG: 50 TAB at 09:07

## 2025-02-17 RX ADMIN — SENNOSIDES AND DOCUSATE SODIUM 2 TABLET: 8.6; 5 TABLET ORAL at 14:30

## 2025-02-17 RX ADMIN — PANTOPRAZOLE SODIUM 40 MG: 40 TABLET, DELAYED RELEASE ORAL at 05:00

## 2025-02-17 RX ADMIN — OXYBUTYNIN CHLORIDE 5 MG: 5 TABLET ORAL at 09:07

## 2025-02-17 RX ADMIN — POLYETHYLENE GLYCOL 3350 17 G: 17 POWDER, FOR SOLUTION ORAL at 14:30

## 2025-02-17 RX ADMIN — APIXABAN 10 MG: 5 TABLET, FILM COATED ORAL at 09:08

## 2025-02-17 RX ADMIN — CETIRIZINE HYDROCHLORIDE 10 MG: 10 TABLET, FILM COATED ORAL at 09:07

## 2025-02-17 RX ADMIN — METOPROLOL TARTRATE 25 MG: 25 TABLET, FILM COATED ORAL at 09:07

## 2025-02-17 RX ADMIN — OXYBUTYNIN CHLORIDE 5 MG: 5 TABLET ORAL at 20:11

## 2025-02-17 RX ADMIN — Medication 10 MG: at 20:10

## 2025-02-17 RX ADMIN — LACTULOSE 20 G: 20 SOLUTION ORAL at 14:30

## 2025-02-17 ASSESSMENT — COGNITIVE AND FUNCTIONAL STATUS - GENERAL
MOBILITY SCORE: 14
EATING MEALS: A LITTLE
HELP NEEDED FOR BATHING: A LITTLE
MOVING FROM LYING ON BACK TO SITTING ON SIDE OF FLAT BED WITH BEDRAILS: A LITTLE
MOVING TO AND FROM BED TO CHAIR: A LOT
DRESSING REGULAR LOWER BODY CLOTHING: A LOT
MOVING FROM LYING ON BACK TO SITTING ON SIDE OF FLAT BED WITH BEDRAILS: A LITTLE
EATING MEALS: A LITTLE
TURNING FROM BACK TO SIDE WHILE IN FLAT BAD: A LOT
DRESSING REGULAR LOWER BODY CLOTHING: A LOT
CLIMB 3 TO 5 STEPS WITH RAILING: TOTAL
WALKING IN HOSPITAL ROOM: A LITTLE
CLIMB 3 TO 5 STEPS WITH RAILING: TOTAL
TOILETING: A LITTLE
STANDING UP FROM CHAIR USING ARMS: A LITTLE
MOBILITY SCORE: 14
MOVING TO AND FROM BED TO CHAIR: A LOT
TOILETING: A LITTLE
HELP NEEDED FOR BATHING: A LITTLE
DAILY ACTIVITIY SCORE: 19
STANDING UP FROM CHAIR USING ARMS: A LITTLE
TURNING FROM BACK TO SIDE WHILE IN FLAT BAD: A LOT
WALKING IN HOSPITAL ROOM: A LITTLE
DAILY ACTIVITIY SCORE: 19

## 2025-02-17 ASSESSMENT — PAIN SCALES - GENERAL
PAINLEVEL_OUTOF10: 0 - NO PAIN
PAINLEVEL_OUTOF10: 0 - NO PAIN

## 2025-02-17 ASSESSMENT — PAIN - FUNCTIONAL ASSESSMENT: PAIN_FUNCTIONAL_ASSESSMENT: 0-10

## 2025-02-17 NOTE — CONSULTS
Nutrition Initial Assessment:   Nutrition Assessment    Reason for Assessment: Provider consult order ([facility] admission)    Patient is a 89 y.o. female who recently admitted to [facility] inpatient for diverticulitis, acute DVT, and acute cystitis. Her urine grew MRSA requiring IV vancomycin for a total of 7 days. She was transitioned to [facility] program for the IV antibiotic therapy.     PMH: CHF, CKD, DM, HLD, HTN    Nutrition History:  Energy Intake: Good > 75 %  Food and Nutrient History: Pt reports good intake and tolerance. Pt states she is enjoying the menu. She is eating >75% of her trays. Pt states she plans to return to the [facility] upon discharge. Has no nutrition concerns at this time, no wt loss, no intake difficulties.     Skilled Unit Only  Appetite/Fluid intake:   good (%)   Feeding Ability:  independent   Dentition:  upper partial   Oral Function:   no problem present      Anthropometrics:  Height: 152.4 cm (5')   Weight: 79 kg (174 lb 2.6 oz)   BMI (Calculated): 34.01  IBW/kg (Dietitian Calculated): 53.6 kg  Percent of IBW: 147 %       Weight History:   Wt Readings from Last 10 Encounters:   02/15/25 79 kg (174 lb 2.6 oz)   02/11/25 77.6 kg (171 lb 1.2 oz)   01/18/25 83.9 kg (184 lb 15.5 oz)   11/19/24 74.1 kg (163 lb 5.8 oz)   10/03/24 74.4 kg (164 lb)   09/06/24 74.4 kg (164 lb 0.4 oz)      Weight Change %:  Weight History / % Weight Change: No changes per pt  Significant Weight Loss: No    Nutrition Focused Physical Exam Findings:  Subcutaneous Fat Loss:   Defer Subcutaneous Fat Loss Assessment: Defer all  Defer All Reason: good appetite and intake; no hx wt loss  Muscle Wasting:  Defer Muscle Wasting Assessment: Defer all  Defer All Reason: good appetite and intake; no hx wt loss    Nutrition Significant Labs:  BMP Trend:   Results from last 7 days   Lab Units 02/17/25  0708 02/16/25  0608 02/15/25  0617 02/14/25  0618   GLUCOSE mg/dL 110* 113*  Spoke with pt. He states he was feeling a little better but now today he is so miserable he feels like he needs to go to the hospital to \" get to the bottom of this\". He states his head is severely clogged up. He has terrible pressure and pain in his left ear. He states he had a headache last night but not so far this morning. He stated the nasal spray was helping but he is \" really bad today\". He states he was on abx for 10 days back on 11/21/19.    105* 105*   CALCIUM mg/dL 8.9 8.9 8.8 8.9   SODIUM mmol/L 139 135* 139 140   POTASSIUM mmol/L 3.7 3.1* 3.4* 3.8   CO2 mmol/L 26 27 24 25   CHLORIDE mmol/L 106 104 109* 109*   BUN mg/dL 17 14 14 17   CREATININE mg/dL 0.88 0.84 0.92 1.03    , Renal Lab Trend:   Results from last 7 days   Lab Units 02/17/25  0708 02/16/25  0608 02/15/25  0617 02/14/25  0618   POTASSIUM mmol/L 3.7 3.1* 3.4* 3.8   SODIUM mmol/L 139 135* 139 140   EGFR mL/min/1.73m*2 63 67 60* 52*   BUN mg/dL 17 14 14 17   CREATININE mg/dL 0.88 0.84 0.92 1.03      Nutrition Specific Medications:  apixaban, 10 mg, oral, BID   Followed by  [START ON 2/20/2025] apixaban, 5 mg, oral, BID  cetirizine, 10 mg, oral, Daily  enalapril, 20 mg, oral, Daily  furosemide, 40 mg, oral, Daily  melatonin, 10 mg, oral, Nightly  metoprolol tartrate, 25 mg, oral, BID  oxybutynin, 5 mg, oral, BID  pantoprazole, 40 mg, oral, Daily before breakfast  pyridoxine, 50 mg, oral, BID  vancomycin, 1,250 mg, intravenous, q24h    I/O:    ;      Dietary Orders (From admission, onward)       Start     Ordered    02/15/25 1127  May Participate in Room Service With Assistance  ( ROOM SERVICE MAY PARTICIPATE WITH ASSISTANCE)  Once        Question:  .  Answer:  Yes    02/15/25 1126    02/15/25 1127  Adult diet Regular  Diet effective now        Question:  Diet type  Answer:  Regular    02/15/25 1126                   Estimated Needs:   Total Energy Estimated Needs in 24 hours (kCal): 1400 kCal  Method for Estimating Needs: IBW  Total Protein Estimated Needs in 24 Hours (g): 55 g  Method for Estimating 24 Hour Protein Needs: IBW  Total Fluid Estimated Needs in 24 Hours (mL): 1400 mL  Method for Estimating 24 Hour Fluid Needs: 1 mL/Kcal        Nutrition Diagnosis   Nutrition Diagnosis  Patient has Nutrition Diagnosis: No       Nutrition Interventions/Recommendations   Nutrition prescription for oral nutrition    Nutrition Recommendations:  Individualized Nutrition Prescription Provided for :  diet, fluids    Nutrition Interventions/Goals:    Agree with regular diet as ordered  Coordination of Care with Providers: Nursing, Provider  Goal: IDT meeting      Education Documentation  N/A - Regular diet order    Nutrition Monitoring and Evaluation   Food/Nutrient Related History Monitoring  Additional Plans: no risk identified. Will follow and monitor per policy      Time Spent (min): 60 minutes

## 2025-02-17 NOTE — CARE PLAN
The clinical goals for the shift include Pt to ambulate to BR, tolerate IV medication    Pt with decreased ambulation or time out of bed. Encouraged patient to at least ambulate to bathroom and be up for meals. Patient states that she does walk to meals at the [facility] and she will when she gets back. Educated that not walking or maintaining stamina while in the hospital will make it difficult for her once back to [facility]. Voiced understanding but still declined to be out of bed for more than 45 minutes. IV antibiotic given without s/s of adverse reaction, infection or infiltration. Left posterior hand with heplock. Encouraging fluids as much as possible. Pt has not a BM since prior to admission to [facility]. Given medications this afternoon with no results as yet.

## 2025-02-17 NOTE — PROGRESS NOTES
Vancomycin Dosing by Pharmacy- FOLLOW UP    Vanessa Adame is a 89 y.o. year old female who Pharmacy has been consulted for vancomycin dosing for UTI - uncomplicated. Based on the patient's indication and renal status this patient is being dosed based on a goal AUC of 400-600.     Renal function is currently stable.    Current vancomycin dose: 1250 mg given every 24 hours    Estimated vancomycin AUC on current dose: 503 mg/L.hr     Visit Vitals  /72 (BP Location: Right arm, Patient Position: Lying)   Pulse 58   Temp 36.5 °C (97.7 °F) (Temporal)   Resp 18        Lab Results   Component Value Date    CREATININE 0.88 2025    CREATININE 0.84 2025    CREATININE 0.92 02/15/2025    CREATININE 1.03 2025        Patient weight is as follows:   Vitals:    02/15/25 1135   Weight: 79 kg (174 lb 2.6 oz)       Cultures:     02/10/2025 2057 2025 1102 Urine Culture [[id number]]    (Abnormal)   Urine from Straight Catheter    Final result Component Value   Urine Culture >=100,000 CFU/mL Methicillin Resistant Staphylococcus aureus (MRSA) Abnormal     Methicillin (Oxacillin) resistant Staphylococci are resistant to all currently available Penicillins, Beta-lactam/Beta-lactamase inhibitor combinations (including Ampicillin/Sulbactam, Amoxicillin/Clavulanate and Pipercillin/Tazobactam), Carbapenems and  Cephalosporins (except Ceftaroline).          I/O last 3 completed shifts:  In: 270 (3.4 mL/kg) [P.O.:270]  Out:  (25.9 mL/kg) [Urine: (0.7 mL/kg/hr)]  Weight: 79 kg   I/O during current shift:  I/O this shift:  In: 120 [P.O.:120]  Out: -     Temp (24hrs), Av.4 °C (97.6 °F), Min:36.3 °C (97.3 °F), Max:36.7 °C (98 °F)      Assessment/Plan    Within goal AUC range. Continue current vancomycin regimen.    This dosing regimen is predicted by [employer] to result in the following pharmacokinetic parameters:    Loading dose: N/A  Regimen: 1250 mg IV every 24 hours.  Start time: 16:50 on  02/17/2025  Exposure target: AUC24 (range)400-600 mg/L.hr   [id number]: 541 mg/L.hr  AUC24,ss: 558 mg/L.hr  Probability of AUC24 > 400: 99 %  Ctrough,ss: 16.9 mg/L  Probability of Ctrough,ss > 20: 21 %    The next level will be obtained on 2/20/25 at 6:00. May be obtained sooner if clinically indicated.   Will continue to monitor renal function daily while on vancomycin and order serum creatinine at least every 48 hours if not already ordered.  Follow for continued vancomycin needs, clinical response, and signs/symptoms of toxicity.       Simin Wilhelm, FeD

## 2025-02-17 NOTE — PROGRESS NOTES
02/17/25 1538   Discharge Planning   Living Arrangements Children   Support Systems Children   Assistance Needed Home health once discharged   Type of Residence Private residence   Expected Discharge Disposition Home   Stroke Family Assessment   Stroke Family Assessment Needed No   Intensity of Service   Intensity of Service 0-30 min     Care Transition Progress Note 2/17/25 check in with pt today.     Patient: Vanessa Adame    Diagnosis: MRSA    Patient Concerns: Pt reports that she is trying to have a BM and not able to go to the bathroom yet. LSW reminded pt about her meeting on Thursday this week. Pt here for IV antibiotics right now.    Patient Needs: IV antibiotics    Care Transition Social Worker explained discharge process and reviewed Medicare Rights today. HERIBERTO DOYLE

## 2025-02-17 NOTE — CARE PLAN
The patient's goals for the shift include      The clinical goals for the shift include Pt to sleep throughout the shift and use call bell when needing assistance    Pt met goal for shift. Slept throughout the shift. Pt has no PT or OT orders, here for ABX. Pt tolerated medications. Pt in bed with call light within reach.

## 2025-02-18 PROCEDURE — 2500000001 HC RX 250 WO HCPCS SELF ADMINISTERED DRUGS (ALT 637 FOR MEDICARE OP): Performed by: NURSE PRACTITIONER

## 2025-02-18 PROCEDURE — 1900000001 HC SKILLED SWING ROOM

## 2025-02-18 PROCEDURE — 2500000002 HC RX 250 W HCPCS SELF ADMINISTERED DRUGS (ALT 637 FOR MEDICARE OP, ALT 636 FOR OP/ED): Performed by: NURSE PRACTITIONER

## 2025-02-18 PROCEDURE — 2500000004 HC RX 250 GENERAL PHARMACY W/ HCPCS (ALT 636 FOR OP/ED): Performed by: NURSE PRACTITIONER

## 2025-02-18 PROCEDURE — 94760 N-INVAS EAR/PLS OXIMETRY 1: CPT

## 2025-02-18 RX ORDER — NYSTATIN 100000 [USP'U]/G
1 POWDER TOPICAL 3 TIMES DAILY PRN
Status: DISCONTINUED | OUTPATIENT
Start: 2025-02-18 | End: 2025-02-20 | Stop reason: HOSPADM

## 2025-02-18 RX ADMIN — CETIRIZINE HYDROCHLORIDE 10 MG: 10 TABLET, FILM COATED ORAL at 09:05

## 2025-02-18 RX ADMIN — ENALAPRIL MALEATE 20 MG: 5 TABLET ORAL at 09:05

## 2025-02-18 RX ADMIN — METOPROLOL TARTRATE 25 MG: 25 TABLET, FILM COATED ORAL at 09:06

## 2025-02-18 RX ADMIN — LACTULOSE 20 G: 20 SOLUTION ORAL at 09:06

## 2025-02-18 RX ADMIN — SENNOSIDES AND DOCUSATE SODIUM 2 TABLET: 8.6; 5 TABLET ORAL at 21:14

## 2025-02-18 RX ADMIN — NYSTATIN 1 APPLICATION: 100000 POWDER TOPICAL at 09:14

## 2025-02-18 RX ADMIN — POLYETHYLENE GLYCOL 3350 17 G: 17 POWDER, FOR SOLUTION ORAL at 09:47

## 2025-02-18 RX ADMIN — PYRIDOXINE HCL TAB 50 MG 50 MG: 50 TAB at 09:06

## 2025-02-18 RX ADMIN — OXYBUTYNIN CHLORIDE 5 MG: 5 TABLET ORAL at 21:14

## 2025-02-18 RX ADMIN — OXYBUTYNIN CHLORIDE 5 MG: 5 TABLET ORAL at 09:06

## 2025-02-18 RX ADMIN — Medication 10 MG: at 21:14

## 2025-02-18 RX ADMIN — VANCOMYCIN 1250 MG: 1.75 INJECTION, SOLUTION INTRAVENOUS at 16:32

## 2025-02-18 RX ADMIN — SENNOSIDES AND DOCUSATE SODIUM 2 TABLET: 8.6; 5 TABLET ORAL at 09:05

## 2025-02-18 RX ADMIN — PYRIDOXINE HCL TAB 50 MG 50 MG: 50 TAB at 21:14

## 2025-02-18 RX ADMIN — FUROSEMIDE 40 MG: 40 TABLET ORAL at 09:06

## 2025-02-18 RX ADMIN — ONDANSETRON 4 MG: 2 INJECTION INTRAMUSCULAR; INTRAVENOUS at 13:40

## 2025-02-18 RX ADMIN — PANTOPRAZOLE SODIUM 40 MG: 40 TABLET, DELAYED RELEASE ORAL at 06:13

## 2025-02-18 RX ADMIN — APIXABAN 10 MG: 5 TABLET, FILM COATED ORAL at 09:06

## 2025-02-18 RX ADMIN — APIXABAN 10 MG: 5 TABLET, FILM COATED ORAL at 21:14

## 2025-02-18 ASSESSMENT — COGNITIVE AND FUNCTIONAL STATUS - GENERAL
TOILETING: A LITTLE
CLIMB 3 TO 5 STEPS WITH RAILING: A LITTLE
MOBILITY SCORE: 23
DAILY ACTIVITIY SCORE: 23

## 2025-02-18 ASSESSMENT — PAIN SCALES - GENERAL
PAINLEVEL_OUTOF10: 0 - NO PAIN
PAINLEVEL_OUTOF10: 0 - NO PAIN

## 2025-02-18 NOTE — CARE PLAN
The patient's goals for the shift include      The clinical goals for the shift include Patient will sit in chair for 2 meals through 1900    Patient with uneventful shift. Up in chair for breakfast and lunch. Tolerating iv atb, no s/s of adverse reactions noted thus far. Patient continent of B&B, several bms today after lactulose, miralax and senna given for constipation. Denies pain. Ambulates 1 assist with walker. Patient resting in bed, call button in reach.

## 2025-02-19 VITALS
OXYGEN SATURATION: 97 % | SYSTOLIC BLOOD PRESSURE: 122 MMHG | RESPIRATION RATE: 16 BRPM | DIASTOLIC BLOOD PRESSURE: 73 MMHG | BODY MASS INDEX: 34.19 KG/M2 | WEIGHT: 174.16 LBS | HEART RATE: 77 BPM | HEIGHT: 60 IN | TEMPERATURE: 97.8 F

## 2025-02-19 LAB
CREAT SERPL-MCNC: 0.93 MG/DL (ref 0.5–1.05)
EGFRCR SERPLBLD CKD-EPI 2021: 59 ML/MIN/1.73M*2
HOLD SPECIMEN: NORMAL

## 2025-02-19 PROCEDURE — 94760 N-INVAS EAR/PLS OXIMETRY 1: CPT

## 2025-02-19 PROCEDURE — 2500000002 HC RX 250 W HCPCS SELF ADMINISTERED DRUGS (ALT 637 FOR MEDICARE OP, ALT 636 FOR OP/ED): Performed by: NURSE PRACTITIONER

## 2025-02-19 PROCEDURE — 82565 ASSAY OF CREATININE: CPT | Performed by: NURSE PRACTITIONER

## 2025-02-19 PROCEDURE — 36415 COLL VENOUS BLD VENIPUNCTURE: CPT | Performed by: INTERNAL MEDICINE

## 2025-02-19 PROCEDURE — 2500000001 HC RX 250 WO HCPCS SELF ADMINISTERED DRUGS (ALT 637 FOR MEDICARE OP): Performed by: NURSE PRACTITIONER

## 2025-02-19 PROCEDURE — 2500000004 HC RX 250 GENERAL PHARMACY W/ HCPCS (ALT 636 FOR OP/ED): Performed by: NURSE PRACTITIONER

## 2025-02-19 PROCEDURE — 1900000001 HC SKILLED SWING ROOM

## 2025-02-19 PROCEDURE — 36415 COLL VENOUS BLD VENIPUNCTURE: CPT | Performed by: NURSE PRACTITIONER

## 2025-02-19 RX ORDER — AMOXICILLIN 250 MG
2 CAPSULE ORAL DAILY
Qty: 60 TABLET | Refills: 11 | Status: SHIPPED | OUTPATIENT
Start: 2025-02-19

## 2025-02-19 RX ORDER — LACTULOSE 10 G/15ML
20 SOLUTION ORAL DAILY
Qty: 237 ML | Refills: 11 | Status: SHIPPED | OUTPATIENT
Start: 2025-02-20

## 2025-02-19 RX ORDER — CALCIUM CARBONATE 200(500)MG
500 TABLET,CHEWABLE ORAL 4 TIMES DAILY PRN
Start: 2025-02-19

## 2025-02-19 RX ORDER — MAGNESIUM HYDROXIDE 2400 MG/10ML
30 SUSPENSION ORAL ONCE
Status: DISCONTINUED | OUTPATIENT
Start: 2025-02-19 | End: 2025-02-20 | Stop reason: HOSPADM

## 2025-02-19 RX ADMIN — PANTOPRAZOLE SODIUM 40 MG: 40 TABLET, DELAYED RELEASE ORAL at 05:52

## 2025-02-19 RX ADMIN — LACTULOSE 20 G: 20 SOLUTION ORAL at 08:33

## 2025-02-19 RX ADMIN — METOPROLOL TARTRATE 25 MG: 25 TABLET, FILM COATED ORAL at 08:33

## 2025-02-19 RX ADMIN — Medication 10 MG: at 20:20

## 2025-02-19 RX ADMIN — APIXABAN 10 MG: 5 TABLET, FILM COATED ORAL at 08:32

## 2025-02-19 RX ADMIN — SENNOSIDES AND DOCUSATE SODIUM 2 TABLET: 8.6; 5 TABLET ORAL at 08:33

## 2025-02-19 RX ADMIN — PYRIDOXINE HCL TAB 50 MG 50 MG: 50 TAB at 08:33

## 2025-02-19 RX ADMIN — FUROSEMIDE 40 MG: 40 TABLET ORAL at 08:32

## 2025-02-19 RX ADMIN — APIXABAN 10 MG: 5 TABLET, FILM COATED ORAL at 20:20

## 2025-02-19 RX ADMIN — POLYETHYLENE GLYCOL 3350 17 G: 17 POWDER, FOR SOLUTION ORAL at 08:33

## 2025-02-19 RX ADMIN — VANCOMYCIN 1250 MG: 1.75 INJECTION, SOLUTION INTRAVENOUS at 16:22

## 2025-02-19 RX ADMIN — OXYBUTYNIN CHLORIDE 5 MG: 5 TABLET ORAL at 08:33

## 2025-02-19 RX ADMIN — SENNOSIDES AND DOCUSATE SODIUM 2 TABLET: 8.6; 5 TABLET ORAL at 20:19

## 2025-02-19 RX ADMIN — OXYBUTYNIN CHLORIDE 5 MG: 5 TABLET ORAL at 20:20

## 2025-02-19 RX ADMIN — CETIRIZINE HYDROCHLORIDE 10 MG: 10 TABLET, FILM COATED ORAL at 08:33

## 2025-02-19 RX ADMIN — METOPROLOL TARTRATE 25 MG: 25 TABLET, FILM COATED ORAL at 20:19

## 2025-02-19 RX ADMIN — ENALAPRIL MALEATE 20 MG: 5 TABLET ORAL at 08:33

## 2025-02-19 RX ADMIN — PYRIDOXINE HCL TAB 50 MG 50 MG: 50 TAB at 20:20

## 2025-02-19 ASSESSMENT — COGNITIVE AND FUNCTIONAL STATUS - GENERAL
DRESSING REGULAR UPPER BODY CLOTHING: A LOT
DRESSING REGULAR LOWER BODY CLOTHING: A LOT
STANDING UP FROM CHAIR USING ARMS: A LITTLE
HELP NEEDED FOR BATHING: A LOT
MOBILITY SCORE: 18
CLIMB 3 TO 5 STEPS WITH RAILING: A LOT
DAILY ACTIVITIY SCORE: 18
STANDING UP FROM CHAIR USING ARMS: A LITTLE
MOBILITY SCORE: 18
DRESSING REGULAR UPPER BODY CLOTHING: A LOT
TURNING FROM BACK TO SIDE WHILE IN FLAT BAD: A LITTLE
DAILY ACTIVITIY SCORE: 18
WALKING IN HOSPITAL ROOM: A LITTLE
WALKING IN HOSPITAL ROOM: A LITTLE
CLIMB 3 TO 5 STEPS WITH RAILING: A LOT
MOVING TO AND FROM BED TO CHAIR: A LITTLE
TURNING FROM BACK TO SIDE WHILE IN FLAT BAD: A LITTLE
MOVING TO AND FROM BED TO CHAIR: A LITTLE
HELP NEEDED FOR BATHING: A LOT
DRESSING REGULAR LOWER BODY CLOTHING: A LOT

## 2025-02-19 ASSESSMENT — PAIN - FUNCTIONAL ASSESSMENT: PAIN_FUNCTIONAL_ASSESSMENT: 0-10

## 2025-02-19 ASSESSMENT — PAIN SCALES - GENERAL
PAINLEVEL_OUTOF10: 0 - NO PAIN
PAINLEVEL_OUTOF10: 0 - NO PAIN

## 2025-02-19 NOTE — CARE PLAN
The clinical goals for the shift include Pt to tolerate IV ATB, ambulate to bathroom and be out of bed for meals    Pt ambulated to bathroom multiple times this day, thus far. Pt having loose stool due to evacuation medications for constipation. Pt denies pain or discomfort, bloating or cramping. Pt incontinent of bowel and bladder most every time to toilet. Patient made aware will be going back to [facility] this evening. Left posterior hand with patent IV remains until last IV antibiotic dose. No adverse reaction noted at this time. Encouraging fluids.

## 2025-02-19 NOTE — PROGRESS NOTES
02/19/25 1644   Discharge Planning   Living Arrangements Other (Comment)   Assistance Needed return to [facility]   Type of Residence Assisted living   Expected Discharge Disposition Home   Patient Choice   Patient / Family choosing to utilize agency / facility established prior to hospitalization Yes   Stroke Family Assessment   Stroke Family Assessment Needed No   Intensity of Service   Intensity of Service 0-30 min     Care Transition Progress Note 2/19/25    Patient: Vanessa Adame    Diagnosis: MRSA    Patient Concerns: Pt reports that she has been going to the restroom a few times today and is concerned about this. LSW let nursing staff know. LSW had pt sign her Medicare rights today [facility]     Patient Needs: Pt reports no needs, would like her medical report sent to the [facility] to give doctor new medicine information. LSW will send MAR to [facility] for pt.     Care Transition Social Worker explained discharge process and reviewed Medicare Rights and had patient sign the Medicare Rights today and provided copy to the patient. HERIBERTO DOYLE

## 2025-02-19 NOTE — CARE PLAN
The patient's goals for the shift include      The clinical goals for the shift include pt megha get a restful nights sleep    Over the shift, the patient did make progress toward the following goals. Pt had an uneventful night, currently resting in bed. Breaths are even and unlabored, no pain or needs voiced att.

## 2025-02-20 PROCEDURE — 99315 NF DSCHRG MGMT 30 MIN/LESS: CPT | Performed by: NURSE PRACTITIONER

## 2025-02-20 NOTE — NURSING NOTE
CCA arrived to the swing unit to transport patient to the [facility]. Patient jacquelyn has no complaints of pain or discomfort. I called the [facility] and spoke with Mariya she is aware of patient returning shortly.

## 2025-02-20 NOTE — NURSING NOTE
I called the [facility] spoke with Mariya updated her with patients new time of arrival approximately 4:30 am. Mariya was thankful for the update.

## 2025-02-21 NOTE — DISCHARGE SUMMARY
Discharge Diagnosis  MRSA infection    Issues Requiring Follow-Up  Follow up with PCP    Discharge Meds     Medication List      START taking these medications     apixaban 5 mg tablet; Commonly known as: Eliquis; Take 1 tablet (5 mg)   by mouth 2 times a day.   calcium carbonate 200 mg calcium chewable tablet; Commonly known as:   Tums; Chew 1 tablet (500 mg) 4 times a day as needed for indigestion or   heartburn (first line).   lactulose 20 gram/30 mL oral solution; Take 30 mL (20 g) by mouth once   daily.   sennosides-docusate sodium 8.6-50 mg tablet; Commonly known as:   Savita-Colace; Take 2 tablets by mouth once daily.     CONTINUE taking these medications     acetaminophen 325 mg tablet; Commonly known as: Tylenol   enalapril 20 mg tablet; Commonly known as: Vasotec   fluticasone 50 mcg/actuation nasal spray; Commonly known as: Flonase   furosemide 40 mg tablet; Commonly known as: Lasix   ibuprofen 200 mg tablet   loratadine 10 mg tablet; Commonly known as: Claritin   metoprolol tartrate 25 mg tablet; Commonly known as: Lopressor   oxybutynin 5 mg tablet; Commonly known as: Ditropan   pantoprazole 40 mg EC tablet; Commonly known as: ProtoNix; Take 1 tablet   (40 mg) by mouth once daily in the morning. Take before meals. Do not   crush, chew, or split.   potassium chloride CR 20 mEq ER tablet; Commonly known as: Klor-Con M20   pyridoxine 50 mg tablet; Commonly known as: Vitamin B-6     STOP taking these medications     dexAMETHasone 6 mg tablet; Commonly known as: Decadron   loperamide 2 mg tablet; Commonly known as: Imodium A-D   ondansetron ODT 4 mg disintegrating tablet; Commonly known as:   Zofran-ODT       Test Results Pending At Discharge  Pending Labs       No current pending labs.            Hospital Course   Vanessa Adame is a 89 y.o. female presenting to [facility] skilled unit. She was recently admitted to [facility] inpatient for diverticulitis, acute DVT, and acute cystitis. Her urine grew  MRSA requiring IV vancomycin for a total of 7 days. She was discharged and transferred to [facility] for the IV antibiotic therapy.   Acute DVT RLE  - US BLE: . Partial thrombus within the right peroneal vein at the area of  patient tenderness.   apixaban 10 mg BID x 7 days then 5 mg BID      Acute Cystitis without hematuria  OAB (overactive bladder)  - UA: LARGE LE, +WBC, 2+ BACTERIA  - Urine culture grew MRSA  Vancomycin 1,250 mg daily; completed  oxybutynin 5 mg BID     Hypokalemia~resolved with replacement     Essential HTN (hypertension)  Chronic Diastolic Heart failure  -normal LVSF  with an EF of 60-65% with impaired relaxation pattern LVDF  - not hypoxic, not tachy, not hypotensive  -enalapril 20 mg daily, metoprolol tartrate 25 mg BID  - monitor BP    Pertinent Physical Exam At Time of Discharge  Vitals reviewed.   Constitutional:       Appearance: Normal appearance. She is obese.   HENT:      Head: Normocephalic and atraumatic.      Right Ear: External ear normal.      Left Ear: External ear normal.      Nose: Nose normal.      Mouth/Throat:      Mouth: Mucous membranes are moist.      Pharynx: Oropharynx is clear.   Eyes:      Conjunctiva/sclera: Conjunctivae normal.      Pupils: Pupils are equal, round, and reactive to light.   Cardiovascular:      Rate and Rhythm: Normal rate and regular rhythm.      Pulses: Normal pulses.      Heart sounds: Normal heart sounds.   Pulmonary:      Effort: Pulmonary effort is normal.      Breath sounds: Normal breath sounds.   Abdominal:      General: Bowel sounds are normal.      Palpations: Abdomen is soft.   Musculoskeletal:         General: Normal range of motion.      Cervical back: Normal range of motion and neck supple.   Skin:     General: Skin is warm and dry.   Neurological:      General: No focal deficit present.      Mental Status: She is alert and oriented to person, place, and time.   Psychiatric:         Mood and Affect: Mood  normal.         Behavior: Behavior normal.     Outpatient Follow-Up  No future appointments.      Erin Leroy, APRN-CNP

## 2025-05-09 ENCOUNTER — HOSPITAL ENCOUNTER (EMERGENCY)
Facility: HOSPITAL | Age: 89
Discharge: HOME | End: 2025-05-09
Attending: EMERGENCY MEDICINE
Payer: MEDICARE

## 2025-05-09 ENCOUNTER — APPOINTMENT (OUTPATIENT)
Dept: RADIOLOGY | Facility: HOSPITAL | Age: 89
End: 2025-05-09
Payer: MEDICARE

## 2025-05-09 VITALS
HEIGHT: 60 IN | WEIGHT: 175 LBS | SYSTOLIC BLOOD PRESSURE: 121 MMHG | OXYGEN SATURATION: 100 % | HEART RATE: 85 BPM | TEMPERATURE: 97.8 F | DIASTOLIC BLOOD PRESSURE: 46 MMHG | RESPIRATION RATE: 18 BRPM | BODY MASS INDEX: 34.36 KG/M2

## 2025-05-09 DIAGNOSIS — S80.211A ABRASION, RIGHT KNEE, INITIAL ENCOUNTER: ICD-10-CM

## 2025-05-09 DIAGNOSIS — S09.90XA CLOSED HEAD INJURY, INITIAL ENCOUNTER: ICD-10-CM
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
## 2025-05-09 PROCEDURE — 70450 CT HEAD/BRAIN W/O DYE: CPT | Performed by: RADIOLOGY

## 2025-05-09 PROCEDURE — 70450 CT HEAD/BRAIN W/O DYE: CPT

## 2025-05-09 PROCEDURE — 99284 EMERGENCY DEPT VISIT MOD MDM: CPT | Mod: 25 | Performed by: EMERGENCY MEDICINE

## 2025-05-09 RX ORDER — WARFARIN SODIUM 5 MG/1
1 TABLET ORAL
COMMUNITY
Start: 2025-04-24

## 2025-05-09 ASSESSMENT — PAIN SCALES - GENERAL: PAINLEVEL_OUTOF10: 2

## 2025-05-09 ASSESSMENT — PAIN - FUNCTIONAL ASSESSMENT: PAIN_FUNCTIONAL_ASSESSMENT: 0-10

## 2025-05-09 ASSESSMENT — PAIN DESCRIPTION - LOCATION: LOCATION: KNEE

## 2025-05-09 ASSESSMENT — PAIN DESCRIPTION - PAIN TYPE: TYPE: ACUTE PAIN

## 2025-05-09 ASSESSMENT — PAIN DESCRIPTION - ORIENTATION: ORIENTATION: RIGHT

## 2025-05-09 NOTE — DISCHARGE INSTRUCTIONS
Tylenol as needed for pain.    Wash right knee abrasion with soap water and pat dry apply bacitracin and Band-Aid twice per day for 7 days.    Follow-up with your primary care doctor 1 week for recheck.    Return for worsening symptoms or concerns.

## 2025-05-09 NOTE — ED PROVIDER NOTES
Emergency Department Provider Note       [facility]   ED  Provider Note  5/9/2025  3:33 PM  AC02/AC02      Chief Complaint   Patient presents with    Fall        History of Present Illness:   Vanessa Adame is a 89 y.o. female presenting to the ED [facility], beginning 1 hour prior to arrival.  The complaint has been resolved mild, mild in severity, and worsened by nothing.  Patient reports she lost her balance stepping into her doorway at the [facility] when she lost her balance and fell.  She did not strike her head but is taking Coumadin.  There is no loss of conscious.  She does have an abrasion on her right knee.  She has no bony pain to the knees feet or hips.  She has no neck or head pain.  She has no chest or rib pain.  She has no abdominal or back pain.      Review of Systems:   Pertinent positives and review of systems as noted above.  Remaining 10 review of systems is negative or noncontributory to today's episode of care.  Review of Systems       --------------------------------------------- PAST HISTORY ---------------------------------------------  Past Medical History: Medical History[1]     Past Surgical History: Surgical History[2]     Social History:   Social History     Social History Narrative    Not on file        Family History: family history is not on file. Unless otherwise noted, family history is non contributory    Patient's Medications   New Prescriptions    No medications on file   Previous Medications    ACETAMINOPHEN (TYLENOL) 325 MG TABLET    Take 2 tablets (650 mg) by mouth every 6 hours if needed for mild pain (1 - 3).    APIXABAN (ELIQUIS) 5 MG TABLET    Take 1 tablet (5 mg) by mouth 2 times a day.    CALCIUM CARBONATE (TUMS) 200 MG CALCIUM CHEWABLE TABLET    Chew 1 tablet (500 mg) 4 times a day as needed for indigestion or heartburn (first line).    ENALAPRIL (VASOTEC) 20 MG TABLET    Take 1 tablet (20 mg) by mouth once daily.    FLUTICASONE (FLONASE) 50 MCG/ACTUATION  NASAL SPRAY    Administer 1 spray into each nostril once daily as needed for rhinitis. Shake gently. Before first use, prime pump. After use, clean tip and replace cap.    FUROSEMIDE (LASIX) 40 MG TABLET    Take 1 tablet (40 mg) by mouth once daily.    IBUPROFEN 200 MG TABLET    Take 1 tablet (200 mg) by mouth once daily as needed for mild pain (1 - 3).    LACTULOSE 20 GRAM/30 ML ORAL SOLUTION    Take 30 mL (20 g) by mouth once daily.    LORATADINE (CLARITIN) 10 MG TABLET    Take 1 tablet (10 mg) by mouth once daily.    METOPROLOL TARTRATE (LOPRESSOR) 25 MG TABLET    Take 1 tablet (25 mg) by mouth 2 times a day.    OXYBUTYNIN (DITROPAN) 5 MG TABLET    Take 1 tablet (5 mg) by mouth 2 times a day.    PANTOPRAZOLE (PROTONIX) 40 MG EC TABLET    Take 1 tablet (40 mg) by mouth once daily in the morning. Take before meals. Do not crush, chew, or split.    POTASSIUM CHLORIDE CR 20 MEQ ER TABLET    Take 1 tablet (20 mEq) by mouth 2 times a day. Do not crush or chew.    PYRIDOXINE (VITAMIN B-6) 50 MG TABLET    Take 1 tablet (50 mg) by mouth 2 times a day.    SENNOSIDES-DOCUSATE SODIUM (SARAHI-COLACE) 8.6-50 MG TABLET    Take 2 tablets by mouth once daily.    WARFARIN (COUMADIN) 5 MG TABLET    Take 1 tablet (5 mg) by mouth early in the morning..   Modified Medications    No medications on file   Discontinued Medications    No medications on file      The patient’s home medications have been reviewed.    Allergies: Amoxapine, Amoxicillin, Penicillin, and Sulfa (sulfonamide antibiotics)    -------------------------------------------------- RESULTS -------------------------------------------------  All laboratory and radiology results have been personally reviewed by myself   LABS:  Labs Reviewed - No data to display      RADIOLOGY:  Interpreted by Radiologist.  CT head wo IV contrast   Final Result   Generalized cerebral and cerebellar atrophy. No acute findings.             MACRO:   none        Signed by: Auzl Montes 5/9/2025  3:59 PM   Dictation workstation:   [id number]            ------------------------- NURSING NOTES AND VITALS REVIEWED ---------------------------   The nursing notes within the ED encounter and vital signs as below have been reviewed.   BP (!) 144/97   Pulse 88   Temp 36.4 °C (97.5 °F)   Resp 19   Ht 1.524 m (5')   Wt 79.4 kg (175 lb)   SpO2 100%   BMI 34.18 kg/m²   Oxygen Saturation Interpretation: Normal      ---------------------------------------------------PHYSICAL EXAM--------------------------------------  Physical Exam   Constitutional/General: Alert,  well appearing, non toxic in NAD  Head: Normocephalic and atraumatic  Eyes: PERRL, EOMI, conjunctiva normal, sclera non icteric  Mouth: Oropharynx clear, handling secretions, no trismus, no asymmetry of the posterior oropharynx or uvular edema  Neck: Supple, full ROM, non tender to palpation in the midline, no stridor, no crepitus, no meningeal signs  Respiratory: Lungs clear to auscultation bilaterally, no wheezes, rales, or rhonchi. Not in respiratory distress  Cardiovascular:  Regular rate. Regular rhythm. No murmurs, gallops, or rubs. 2+ distal pulses  Chest: No chest wall tenderness  GI:  Abdomen Soft, Non tender, Non distended.  +BS. No organomegaly, no palpable masses,  No rebound, guarding, or rigidity.   Musculoskeletal: Moves all extremities x 4. Warm and well perfused, no clubbing, cyanosis, or edema. Capillary refill <3 seconds, upper lower extremities are nontender to touch.  There is a superficial abrasion on the right knee.  The pelvis is stable.  Ribs and collarbones are nontender.  Upper extremities nontender with normal range of motion.  Neck is supple nontender to palpation.  Head is atraumatic and nontender.  Integument: skin warm and dry. No rashes.   Lymphatic: no lymphadenopathy noted  Neurologic: No focal deficits, symmetric strength 5/5 in the upper and lower extremities bilaterally  Psychiatric: Normal  Affect    Procedures    ------------------------------ ED COURSE/MEDICAL DECISION MAKING----------------------  Diagnoses as of 05/09/25 1609   Fall, initial encounter   Abrasion, right knee, initial encounter   Closed head injury, initial encounter      Patient had a mechanical fall and she is taking Coumadin.  She has no head injury she has no head pain.  Her neck is supple nontender.  CT scan of the head shows some generalized age-appropriate atrophy but no acute findings.  She has an abrasion to her right knee but no bony tenderness.  The patient has been cleaned and she is stable for outpatient management.    Differential Diagnosis: Fall on thinners, closed head injury, intracranial hemorrhage, right knee abrasion    Medical Decision Making:   Discharge to [facility]  Diagnoses as of 05/09/25 1609   Fall, initial encounter   Abrasion, right knee, initial encounter   Closed head injury, initial encounter        Counseling:   The emergency provider has spoken with the patient and discussed today’s results, in addition to providing specific details for the plan of care and counseling regarding the diagnosis and prognosis.  Questions are answered at this time and they are agreeable with the plan.      --------------------------------- IMPRESSION AND DISPOSITION ---------------------------------        IMPRESSION  1. Fall, initial encounter    2. Abrasion, right knee, initial encounter    3. Closed head injury, initial encounter        DISPOSITION  Disposition: Discharge to nursing home  Patient condition is fair      Billing Provider Critical Care Time: 0 minutes         [1]   Past Medical History:  Diagnosis Date    Anxiety     CHF (congestive heart failure)     Chronic osteoarthritis     Chronic renal disease, stage III (Multi)     Dementia with behavioral problem (Multi)     Diabetes mellitus (Multi)     Diverticulosis of intestine without perforation or abscess without bleeding     History of fall      Hyperlipemia     Hypertension     Hypokalemia     Lack of coordination     Overactive bladder     Senile dementia, uncomplicated (Multi)     Spinal stenosis     Unsteady gait     Weakness generalized    [2]   Past Surgical History:  Procedure Laterality Date    TOTAL KNEE ARTHROPLASTY Left         Emeterio Luna MD  05/09/25 0303

## 2025-06-02 ENCOUNTER — HOSPITAL ENCOUNTER (OUTPATIENT)
Dept: RADIOLOGY | Facility: HOSPITAL | Age: 89
Discharge: HOME | End: 2025-06-02
Payer: MEDICARE

## 2025-06-02 DIAGNOSIS — R60.0 LEG EDEMA, RIGHT: ICD-10-CM

## 2025-06-02 DIAGNOSIS — M79.604 RIGHT LEG PAIN: ICD-10-CM

## 2025-06-02 PROCEDURE — 93971 EXTREMITY STUDY: CPT

## 2025-06-02 PROCEDURE — 93971 EXTREMITY STUDY: CPT | Performed by: RADIOLOGY

## 2025-06-29 ENCOUNTER — HOSPITAL ENCOUNTER (EMERGENCY)
Facility: HOSPITAL | Age: 89
Discharge: HOME | End: 2025-06-29
Attending: FAMILY MEDICINE
Payer: MEDICARE

## 2025-06-29 VITALS
OXYGEN SATURATION: 97 % | TEMPERATURE: 97.1 F | SYSTOLIC BLOOD PRESSURE: 126 MMHG | BODY MASS INDEX: 34.37 KG/M2 | WEIGHT: 175.04 LBS | HEART RATE: 85 BPM | RESPIRATION RATE: 17 BRPM | DIASTOLIC BLOOD PRESSURE: 61 MMHG | HEIGHT: 60 IN

## 2025-06-29 DIAGNOSIS — N39.0 URINARY TRACT INFECTION WITHOUT HEMATURIA, SITE UNSPECIFIED: Primary | ICD-10-CM

## 2025-06-29 LAB
APPEARANCE UR: ABNORMAL
BILIRUB UR STRIP.AUTO-MCNC: NEGATIVE MG/DL
COLOR UR: YELLOW
GLUCOSE UR STRIP.AUTO-MCNC: NEGATIVE MG/DL
HYALINE CASTS #/AREA URNS AUTO: ABNORMAL /LPF
KETONES UR STRIP.AUTO-MCNC: NEGATIVE MG/DL
LEUKOCYTE ESTERASE UR QL STRIP.AUTO: ABNORMAL
NITRITE UR QL STRIP.AUTO: NEGATIVE
PH UR STRIP.AUTO: 5 [PH]
PROT UR STRIP.AUTO-MCNC: NEGATIVE MG/DL
RBC # UR STRIP.AUTO: NEGATIVE MG/DL
RBC #/AREA URNS AUTO: ABNORMAL /HPF
SP GR UR STRIP.AUTO: 1.01
SQUAMOUS #/AREA URNS AUTO: ABNORMAL /HPF
UROBILINOGEN UR STRIP.AUTO-MCNC: <2 MG/DL
WBC #/AREA URNS AUTO: ABNORMAL /HPF

## 2025-06-29 PROCEDURE — 2500000002 HC RX 250 W HCPCS SELF ADMINISTERED DRUGS (ALT 637 FOR MEDICARE OP, ALT 636 FOR OP/ED)

## 2025-06-29 PROCEDURE — 99283 EMERGENCY DEPT VISIT LOW MDM: CPT | Performed by: FAMILY MEDICINE

## 2025-06-29 PROCEDURE — 87086 URINE CULTURE/COLONY COUNT: CPT | Mod: CONLAB | Performed by: FAMILY MEDICINE

## 2025-06-29 PROCEDURE — 81001 URINALYSIS AUTO W/SCOPE: CPT | Performed by: FAMILY MEDICINE

## 2025-06-29 PROCEDURE — 36415 COLL VENOUS BLD VENIPUNCTURE: CPT | Performed by: FAMILY MEDICINE

## 2025-06-29 RX ORDER — NITROFURANTOIN 25; 75 MG/1; MG/1
CAPSULE ORAL
Status: COMPLETED
Start: 2025-06-29 | End: 2025-06-29

## 2025-06-29 RX ORDER — NITROFURANTOIN 25; 75 MG/1; MG/1
100 CAPSULE ORAL 2 TIMES DAILY
Qty: 10 CAPSULE | Refills: 0 | Status: ON HOLD | OUTPATIENT
Start: 2025-06-29 | End: 2025-07-04

## 2025-06-29 RX ORDER — NITROFURANTOIN 25; 75 MG/1; MG/1
100 CAPSULE ORAL ONCE
Status: COMPLETED | OUTPATIENT
Start: 2025-06-29 | End: 2025-06-29

## 2025-06-29 RX ADMIN — NITROFURANTOIN MONOHYDRATE/MACROCRYSTALS 100 MG: 25; 75 CAPSULE ORAL at 14:58

## 2025-06-29 RX ADMIN — NITROFURANTOIN 100 MG: 25; 75 CAPSULE ORAL at 14:58

## 2025-06-29 ASSESSMENT — PAIN - FUNCTIONAL ASSESSMENT
PAIN_FUNCTIONAL_ASSESSMENT: 0-10
PAIN_FUNCTIONAL_ASSESSMENT: 0-10

## 2025-06-29 ASSESSMENT — PAIN SCALES - GENERAL
PAINLEVEL_OUTOF10: 0 - NO PAIN
PAINLEVEL_OUTOF10: 0 - NO PAIN

## 2025-06-29 NOTE — ED PROVIDER NOTES
Emergency Department Provider Note       History of Present Illness     History provided by: Patient  Limitations to History: None  External Records Reviewed with Brief Summary: Reviewed care everywhere and the epic chart    HPI:  Vanessa Adame is a 89 y.o. female brought to the ED by EMS after they were called by the patient's nursing staff at her nursing facility due to nursing feeling that the patient's room smelled like a UTI.  Patient after talking with nursing facility was agreeable to come to the ED and being evaluated.  Patient upon arrival to the ED was alert, cooperative, appeared comfortable, and in no distress.  Patient currently reports he is here as requested but has no physical complaints otherwise has been doing well.  Patient again denies any symptoms or concerns.  Patient's family was notified of patient's presence in the ED and her evaluation.    Physical Exam   Triage vitals:  T 36 °C (96.8 °F)  HR 87  /53  RR 19  O2 95 %      Physical Exam  Vitals and nursing note reviewed.   Constitutional:       Appearance: Normal appearance.   HENT:      Head: Normocephalic and atraumatic.      Nose: Nose normal.      Mouth/Throat:      Mouth: Mucous membranes are moist.      Pharynx: Oropharynx is clear.   Eyes:      Extraocular Movements: Extraocular movements intact.      Conjunctiva/sclera: Conjunctivae normal.      Pupils: Pupils are equal, round, and reactive to light.   Cardiovascular:      Rate and Rhythm: Normal rate and regular rhythm.      Pulses: Normal pulses.      Heart sounds: Normal heart sounds.   Abdominal:      General: Abdomen is flat.   Musculoskeletal:         General: Normal range of motion.      Cervical back: Normal range of motion and neck supple.   Skin:     General: Skin is warm.      Capillary Refill: Capillary refill takes less than 2 seconds.   Neurological:      General: No focal deficit present.      Mental Status: She is alert and oriented to person, place,  and time.   Psychiatric:         Mood and Affect: Mood normal.         Labs Reviewed   URINALYSIS WITH REFLEX CULTURE AND MICROSCOPIC - Abnormal       Result Value    Color, Urine Yellow      Appearance, Urine Hazy (*)     Specific Gravity, Urine 1.011      pH, Urine 5.0      Protein, Urine NEGATIVE      Glucose, Urine NEGATIVE      Blood, Urine NEGATIVE      Ketones, Urine NEGATIVE      Bilirubin, Urine NEGATIVE      Urobilinogen, Urine <2.0      Nitrite, Urine NEGATIVE      Leukocyte Esterase, Urine SMALL (1+) (*)    MICROSCOPIC ONLY, URINE - Abnormal    WBC, Urine 11-20 (*)     RBC, Urine 1-2      Squamous Epithelial Cells, Urine 10-25 (FEW)      Hyaline Casts, Urine OCCASIONAL (*)    URINE CULTURE   GREEN TOP    Extra Tube Hold for add-ons.     GRAY TOP    Extra Tube Hold for add-ons.     URINALYSIS WITH REFLEX CULTURE AND MICROSCOPIC    Narrative:     The following orders were created for panel order Urinalysis with Reflex Culture and Microscopic.  Procedure                               Abnormality         Status                     ---------                               -----------         ------                     Urinalysis with Reflex C...[[id number]]  Abnormal            Final result               Extra Urine Gray Tube[[id number]]                            In process                   Please view results for these tests on the individual orders.   EXTRA URINE GRAY TUBE       Medical Decision Making & ED Course     Pt upon arrival to the ED appeared to be comfortable and in no distress with stable vitals.  Discussed with pt the presenting complaints and clinically findings.  Reviewed with pt the epic chart and counseled the patient on UTIs and appropriate approach to management/treatments.  After assessment and evaluation patient's findings of his labs were within normal limits at her baseline and at this time a urine sample was sent and patient was observed.  Upon her reevaluation she continued to be  doing well with no new physical complaints and appropriate vital signs.  Your results were reviewed discussed with her and findings appear to be consistent with a mild UTI.  Patient was given oral antibiotics in the ED as well as prescription for home and patient's nursing home staff was contacted informed of findings and are pending discharge back to the facility.  Patient's family is also notified and aware of plan of care and comfortable taking patient back to the nursing facility.  Patient also advised to follow-up with her primary care doctor next several days for recheck and patient in stable condition and discharged back to the nursing facility.    Social Determinants of Health which Significantly Impact Care: Social Determinants of Health which Significantly Impact Care: None identified     EKG Independent Interpretation:     Independent Result Review and Interpretation:     Chronic conditions affecting the patient's care: As documented above in [facility]    The patient was discussed with the following consultants/services: None    Care Considerations: As documented above in [facility]    ED Course:  Diagnoses as of 06/29/25 1609   Urinary tract infection without hematuria, site unspecified       Disposition   As a result of the work-up, the patient was discharged home.  she was informed of her diagnosis and instructed to come back with any concerns or worsening of condition.  she and was agreeable to the plan as discussed above.  she was given the opportunity to ask questions.  All of the patient's questions were answered.    Procedures   Procedures        Roney Zaapta MD  Emergency Medicine                                                       Roney Zapata MD  06/29/25 1611

## 2025-06-30 LAB
HOLD SPECIMEN: NORMAL

## 2025-07-01 ENCOUNTER — APPOINTMENT (OUTPATIENT)
Dept: RADIOLOGY | Facility: HOSPITAL | Age: 89
End: 2025-07-01
Payer: MEDICARE

## 2025-07-01 ENCOUNTER — APPOINTMENT (OUTPATIENT)
Dept: CARDIOLOGY | Facility: HOSPITAL | Age: 89
End: 2025-07-01
Payer: MEDICARE

## 2025-07-01 ENCOUNTER — HOSPITAL ENCOUNTER (EMERGENCY)
Facility: HOSPITAL | Age: 89
Discharge: ANOTHER HOSPITAL | End: 2025-07-02
Attending: EMERGENCY MEDICINE
Payer: MEDICARE

## 2025-07-01 DIAGNOSIS — N39.0 URINARY TRACT INFECTION WITHOUT HEMATURIA, SITE UNSPECIFIED: ICD-10-CM

## 2025-07-01 DIAGNOSIS — D64.9 SYMPTOMATIC ANEMIA: Primary | ICD-10-CM

## 2025-07-01 DIAGNOSIS — R11.0 NAUSEA: ICD-10-CM

## 2025-07-01 DIAGNOSIS — T45.515A WARFARIN-INDUCED COAGULOPATHY (MULTI): ICD-10-CM

## 2025-07-01 DIAGNOSIS — R41.82 ALTERED MENTAL STATUS, UNSPECIFIED ALTERED MENTAL STATUS TYPE: ICD-10-CM

## 2025-07-01 DIAGNOSIS — D68.32 WARFARIN-INDUCED COAGULOPATHY (MULTI): ICD-10-CM

## 2025-07-01 LAB
ABO GROUP (TYPE) IN BLOOD: NORMAL
ABO GROUP (TYPE) IN BLOOD: NORMAL
ALBUMIN SERPL BCP-MCNC: 3.9 G/DL (ref 3.4–5)
ALP SERPL-CCNC: 75 U/L (ref 33–136)
ALT SERPL W P-5'-P-CCNC: 10 U/L (ref 7–45)
ANION GAP SERPL CALC-SCNC: 12 MMOL/L (ref 10–20)
ANTIBODY SCREEN: NORMAL
APPEARANCE UR: CLEAR
AST SERPL W P-5'-P-CCNC: 19 U/L (ref 9–39)
BACTERIA UR CULT: NORMAL
BASOPHILS # BLD AUTO: 0.03 X10*3/UL (ref 0–0.1)
BASOPHILS # BLD AUTO: 0.04 X10*3/UL (ref 0–0.1)
BASOPHILS NFR BLD AUTO: 0.4 %
BASOPHILS NFR BLD AUTO: 0.6 %
BILIRUB SERPL-MCNC: 0.9 MG/DL (ref 0–1.2)
BILIRUB UR STRIP.AUTO-MCNC: NEGATIVE MG/DL
BLOOD EXPIRATION DATE: NORMAL
BLOOD EXPIRATION DATE: NORMAL
BUN SERPL-MCNC: 14 MG/DL (ref 6–23)
CALCIUM SERPL-MCNC: 10 MG/DL (ref 8.6–10.3)
CARDIAC TROPONIN I PNL SERPL HS: 15 NG/L (ref 0–13)
CHLORIDE SERPL-SCNC: 101 MMOL/L (ref 98–107)
CO2 SERPL-SCNC: 25 MMOL/L (ref 21–32)
COLOR UR: YELLOW
CREAT SERPL-MCNC: 0.95 MG/DL (ref 0.5–1.05)
DACRYOCYTES BLD QL SMEAR: NORMAL
DISPENSE STATUS: NORMAL
DISPENSE STATUS: NORMAL
EGFRCR SERPLBLD CKD-EPI 2021: 58 ML/MIN/1.73M*2
EOSINOPHIL # BLD AUTO: 0.05 X10*3/UL (ref 0–0.4)
EOSINOPHIL # BLD AUTO: 0.08 X10*3/UL (ref 0–0.4)
EOSINOPHIL NFR BLD AUTO: 0.7 %
EOSINOPHIL NFR BLD AUTO: 1.2 %
ERYTHROCYTE [DISTWIDTH] IN BLOOD BY AUTOMATED COUNT: 19.6 % (ref 11.5–14.5)
ERYTHROCYTE [DISTWIDTH] IN BLOOD BY AUTOMATED COUNT: 19.6 % (ref 11.5–14.5)
GLUCOSE SERPL-MCNC: 141 MG/DL (ref 74–99)
GLUCOSE UR STRIP.AUTO-MCNC: NEGATIVE MG/DL
HCT VFR BLD AUTO: 17.1 % (ref 36–46)
HCT VFR BLD AUTO: 19.4 % (ref 36–46)
HEMOCCULT SP1 STL QL: POSITIVE
HGB BLD-MCNC: 4.5 G/DL (ref 12–16)
HGB BLD-MCNC: 5 G/DL (ref 12–16)
HYPOCHROMIA BLD QL SMEAR: NORMAL
HYPOCHROMIA BLD QL SMEAR: NORMAL
IMM GRANULOCYTES # BLD AUTO: 0.04 X10*3/UL (ref 0–0.5)
IMM GRANULOCYTES # BLD AUTO: 0.04 X10*3/UL (ref 0–0.5)
IMM GRANULOCYTES NFR BLD AUTO: 0.6 % (ref 0–0.9)
IMM GRANULOCYTES NFR BLD AUTO: 0.6 % (ref 0–0.9)
INR PPP: 7.2 (ref 0.9–1.1)
KETONES UR STRIP.AUTO-MCNC: NEGATIVE MG/DL
LEUKOCYTE ESTERASE UR QL STRIP.AUTO: NEGATIVE
LIPASE SERPL-CCNC: 11 U/L (ref 9–82)
LYMPHOCYTES # BLD AUTO: 0.92 X10*3/UL (ref 0.8–3)
LYMPHOCYTES # BLD AUTO: 0.95 X10*3/UL (ref 0.8–3)
LYMPHOCYTES NFR BLD AUTO: 13.8 %
LYMPHOCYTES NFR BLD AUTO: 14.6 %
MAGNESIUM SERPL-MCNC: 1.79 MG/DL (ref 1.6–2.4)
MCH RBC QN AUTO: 18.1 PG (ref 26–34)
MCH RBC QN AUTO: 18.4 PG (ref 26–34)
MCHC RBC AUTO-ENTMCNC: 25.8 G/DL (ref 32–36)
MCHC RBC AUTO-ENTMCNC: 26.3 G/DL (ref 32–36)
MCV RBC AUTO: 70 FL (ref 80–100)
MCV RBC AUTO: 70 FL (ref 80–100)
MONOCYTES # BLD AUTO: 0.59 X10*3/UL (ref 0.05–0.8)
MONOCYTES # BLD AUTO: 0.62 X10*3/UL (ref 0.05–0.8)
MONOCYTES NFR BLD AUTO: 8.8 %
MONOCYTES NFR BLD AUTO: 9.5 %
NEUTROPHILS # BLD AUTO: 4.77 X10*3/UL (ref 1.6–5.5)
NEUTROPHILS # BLD AUTO: 5.05 X10*3/UL (ref 1.6–5.5)
NEUTROPHILS NFR BLD AUTO: 73.5 %
NEUTROPHILS NFR BLD AUTO: 75.7 %
NITRITE UR QL STRIP.AUTO: NEGATIVE
NRBC BLD-RTO: 0 /100 WBCS (ref 0–0)
NRBC BLD-RTO: 0 /100 WBCS (ref 0–0)
OVALOCYTES BLD QL SMEAR: NORMAL
PH UR STRIP.AUTO: 5 [PH]
PLATELET # BLD AUTO: 297 X10*3/UL (ref 150–450)
PLATELET # BLD AUTO: 337 X10*3/UL (ref 150–450)
POTASSIUM SERPL-SCNC: 3.8 MMOL/L (ref 3.5–5.3)
PRODUCT BLOOD TYPE: 5100
PRODUCT BLOOD TYPE: 5100
PRODUCT CODE: NORMAL
PRODUCT CODE: NORMAL
PROT SERPL-MCNC: 6.7 G/DL (ref 6.4–8.2)
PROT UR STRIP.AUTO-MCNC: NEGATIVE MG/DL
PROTHROMBIN TIME: 79.6 SECONDS (ref 9.8–12.4)
RBC # BLD AUTO: 2.44 X10*6/UL (ref 4–5.2)
RBC # BLD AUTO: 2.77 X10*6/UL (ref 4–5.2)
RBC # UR STRIP.AUTO: NEGATIVE MG/DL
RBC MORPH BLD: NORMAL
RBC MORPH BLD: NORMAL
RH FACTOR (ANTIGEN D): NORMAL
RH FACTOR (ANTIGEN D): NORMAL
SODIUM SERPL-SCNC: 134 MMOL/L (ref 136–145)
SP GR UR STRIP.AUTO: 1.01
UNIT ABO: NORMAL
UNIT ABO: NORMAL
UNIT NUMBER: NORMAL
UNIT NUMBER: NORMAL
UNIT RH: NORMAL
UNIT RH: NORMAL
UNIT VOLUME: 350
UNIT VOLUME: 350
UROBILINOGEN UR STRIP.AUTO-MCNC: <2 MG/DL
WBC # BLD AUTO: 6.5 X10*3/UL (ref 4.4–11.3)
WBC # BLD AUTO: 6.7 X10*3/UL (ref 4.4–11.3)
XM INTEP: NORMAL
XM INTEP: NORMAL

## 2025-07-01 PROCEDURE — 2500000004 HC RX 250 GENERAL PHARMACY W/ HCPCS (ALT 636 FOR OP/ED): Performed by: EMERGENCY MEDICINE

## 2025-07-01 PROCEDURE — 36415 COLL VENOUS BLD VENIPUNCTURE: CPT | Performed by: EMERGENCY MEDICINE

## 2025-07-01 PROCEDURE — 82270 OCCULT BLOOD FECES: CPT | Performed by: EMERGENCY MEDICINE

## 2025-07-01 PROCEDURE — 2550000001 HC RX 255 CONTRASTS: Performed by: EMERGENCY MEDICINE

## 2025-07-01 PROCEDURE — 85025 COMPLETE CBC W/AUTO DIFF WBC: CPT | Performed by: EMERGENCY MEDICINE

## 2025-07-01 PROCEDURE — 99285 EMERGENCY DEPT VISIT HI MDM: CPT | Mod: 25 | Performed by: EMERGENCY MEDICINE

## 2025-07-01 PROCEDURE — 70450 CT HEAD/BRAIN W/O DYE: CPT | Performed by: RADIOLOGY

## 2025-07-01 PROCEDURE — 74177 CT ABD & PELVIS W/CONTRAST: CPT | Performed by: RADIOLOGY

## 2025-07-01 PROCEDURE — 81003 URINALYSIS AUTO W/O SCOPE: CPT | Performed by: EMERGENCY MEDICINE

## 2025-07-01 PROCEDURE — 2500000002 HC RX 250 W HCPCS SELF ADMINISTERED DRUGS (ALT 637 FOR MEDICARE OP, ALT 636 FOR OP/ED): Performed by: EMERGENCY MEDICINE

## 2025-07-01 PROCEDURE — 2500000001 HC RX 250 WO HCPCS SELF ADMINISTERED DRUGS (ALT 637 FOR MEDICARE OP)

## 2025-07-01 PROCEDURE — 36430 TRANSFUSION BLD/BLD COMPNT: CPT

## 2025-07-01 PROCEDURE — 86901 BLOOD TYPING SEROLOGIC RH(D): CPT | Performed by: EMERGENCY MEDICINE

## 2025-07-01 PROCEDURE — 83735 ASSAY OF MAGNESIUM: CPT | Performed by: EMERGENCY MEDICINE

## 2025-07-01 PROCEDURE — 74177 CT ABD & PELVIS W/CONTRAST: CPT

## 2025-07-01 PROCEDURE — P9017 PLASMA 1 DONOR FRZ W/IN 8 HR: HCPCS

## 2025-07-01 PROCEDURE — 84484 ASSAY OF TROPONIN QUANT: CPT | Performed by: EMERGENCY MEDICINE

## 2025-07-01 PROCEDURE — 96361 HYDRATE IV INFUSION ADD-ON: CPT

## 2025-07-01 PROCEDURE — 96374 THER/PROPH/DIAG INJ IV PUSH: CPT | Mod: 59

## 2025-07-01 PROCEDURE — 83690 ASSAY OF LIPASE: CPT | Performed by: EMERGENCY MEDICINE

## 2025-07-01 PROCEDURE — 93005 ELECTROCARDIOGRAM TRACING: CPT

## 2025-07-01 PROCEDURE — 70450 CT HEAD/BRAIN W/O DYE: CPT

## 2025-07-01 PROCEDURE — 86900 BLOOD TYPING SEROLOGIC ABO: CPT | Performed by: EMERGENCY MEDICINE

## 2025-07-01 PROCEDURE — 84075 ASSAY ALKALINE PHOSPHATASE: CPT | Performed by: EMERGENCY MEDICINE

## 2025-07-01 PROCEDURE — 71260 CT THORAX DX C+: CPT | Performed by: RADIOLOGY

## 2025-07-01 PROCEDURE — 86920 COMPATIBILITY TEST SPIN: CPT

## 2025-07-01 PROCEDURE — 85610 PROTHROMBIN TIME: CPT | Performed by: EMERGENCY MEDICINE

## 2025-07-01 PROCEDURE — 71260 CT THORAX DX C+: CPT

## 2025-07-01 PROCEDURE — P9016 RBC LEUKOCYTES REDUCED: HCPCS

## 2025-07-01 RX ORDER — ONDANSETRON HYDROCHLORIDE 2 MG/ML
4 INJECTION, SOLUTION INTRAVENOUS ONCE
Status: COMPLETED | OUTPATIENT
Start: 2025-07-01 | End: 2025-07-01

## 2025-07-01 RX ORDER — PHYTONADIONE 5 MG/1
5 TABLET ORAL ONCE
Status: COMPLETED | OUTPATIENT
Start: 2025-07-01 | End: 2025-07-01

## 2025-07-01 RX ORDER — ACETAMINOPHEN 325 MG/1
TABLET ORAL
Status: COMPLETED
Start: 2025-07-01 | End: 2025-07-01

## 2025-07-01 RX ORDER — ACETAMINOPHEN 325 MG/1
650 TABLET ORAL ONCE
Status: COMPLETED | OUTPATIENT
Start: 2025-07-01 | End: 2025-07-01

## 2025-07-01 RX ORDER — PANTOPRAZOLE SODIUM 40 MG/10ML
40 INJECTION, POWDER, LYOPHILIZED, FOR SOLUTION INTRAVENOUS DAILY
Status: DISCONTINUED | OUTPATIENT
Start: 2025-07-01 | End: 2025-07-02 | Stop reason: HOSPADM

## 2025-07-01 RX ORDER — SODIUM CHLORIDE 9 MG/ML
125 INJECTION, SOLUTION INTRAVENOUS CONTINUOUS
Status: DISCONTINUED | OUTPATIENT
Start: 2025-07-01 | End: 2025-07-02

## 2025-07-01 RX ADMIN — ACETAMINOPHEN 650 MG: 325 TABLET ORAL at 20:28

## 2025-07-01 RX ADMIN — SODIUM CHLORIDE 500 ML: 0.9 INJECTION, SOLUTION INTRAVENOUS at 13:33

## 2025-07-01 RX ADMIN — PANTOPRAZOLE SODIUM 40 MG: 40 INJECTION, POWDER, FOR SOLUTION INTRAVENOUS at 20:27

## 2025-07-01 RX ADMIN — IOHEXOL 100 ML: 350 INJECTION, SOLUTION INTRAVENOUS at 14:55

## 2025-07-01 RX ADMIN — ONDANSETRON 4 MG: 2 INJECTION INTRAMUSCULAR; INTRAVENOUS at 13:33

## 2025-07-01 RX ADMIN — SODIUM CHLORIDE 125 ML/HR: 9 INJECTION, SOLUTION INTRAVENOUS at 20:29

## 2025-07-01 RX ADMIN — PHYTONADIONE 5 MG: 5 TABLET ORAL at 20:53

## 2025-07-01 ASSESSMENT — PAIN SCALES - GENERAL
PAINLEVEL_OUTOF10: 2
PAINLEVEL_OUTOF10: 0 - NO PAIN

## 2025-07-01 ASSESSMENT — PAIN DESCRIPTION - DESCRIPTORS: DESCRIPTORS: ACHING

## 2025-07-01 ASSESSMENT — PAIN DESCRIPTION - PAIN TYPE: TYPE: ACUTE PAIN

## 2025-07-01 ASSESSMENT — PAIN - FUNCTIONAL ASSESSMENT
PAIN_FUNCTIONAL_ASSESSMENT: 0-10
PAIN_FUNCTIONAL_ASSESSMENT: 0-10

## 2025-07-01 ASSESSMENT — PAIN DESCRIPTION - LOCATION: LOCATION: LEG

## 2025-07-01 NOTE — ED PROVIDER NOTES
HPI   Chief Complaint   Patient presents with    Urinary Frequency     Pt being treated for UTI, pt having some confusion yesterday and more today. Currently on Macrodantin, patient is complaining of nausea today.       89-year-old female with history of DVT on warfarin, anxiety CHF CKD dementia diabetes hyperlipidemia hypertension hypokalemia overactive bladder spinal stenosis and current UTI presents for evaluation of confusion.  She is on nitrofurantoin for UTI.  Over the past 2 days patient has become increasingly confused weak and forgetful.  No falls.  She is nauseous without vomiting.  Denies pain.  Sent in for evaluation of confusion/altered mental status.      History provided by:  Patient, medical records and EMS personnel          Patient History   Past Medical History:   Diagnosis Date    Anxiety     CHF (congestive heart failure)     Chronic osteoarthritis     Chronic renal disease, stage III (Multi)     Dementia with behavioral problem (Multi)     Diabetes mellitus (Multi)     Diverticulosis of intestine without perforation or abscess without bleeding     History of fall     Hyperlipemia     Hypertension     Hypokalemia     Lack of coordination     Overactive bladder     Senile dementia, uncomplicated (Multi)     Spinal stenosis     Unsteady gait     Weakness generalized      Surgical History[1]  Family History[2]  Social History[3]    Physical Exam   ED Triage Vitals   Temp Pulse Resp BP   -- -- -- --      SpO2 Temp src Heart Rate Source Patient Position   -- -- -- --      BP Location FiO2 (%)     -- --       Physical Exam  Vitals and nursing note reviewed.   Constitutional:       Appearance: Normal appearance.   HENT:      Head: Normocephalic and atraumatic.      Nose: Nose normal.      Mouth/Throat:      Mouth: Mucous membranes are moist.   Eyes:      Extraocular Movements: Extraocular movements intact.      Pupils: Pupils are equal, round, and reactive to light.   Cardiovascular:      Rate and  Rhythm: Normal rate and regular rhythm.   Pulmonary:      Effort: Pulmonary effort is normal.      Breath sounds: Normal breath sounds.   Abdominal:      Palpations: Abdomen is soft.      Tenderness: There is no abdominal tenderness.   Musculoskeletal:         General: No deformity.      Cervical back: Normal range of motion. No rigidity.   Skin:     General: Skin is warm and dry.   Neurological:      General: No focal deficit present.      Mental Status: She is alert and oriented to person, place, and time.      Cranial Nerves: No cranial nerve deficit.      Motor: No weakness.   Psychiatric:         Mood and Affect: Mood normal.           ED Course & MDM   ED Course as of 07/02/25 1156   Tue Jul 01, 2025   1332 89-year-old female on Macrobid for UTI presents with confusion.  Repeat UA, labs, CT head chest abdomen and pelvis.  Zofran for nausea.  Saline bolus. [BT]   1513 ECG 12 lead  EKG interpreted by me shows sinus rhythm with first-degree AV block.  Rate = 81.  Left axis deviation.  Left bundle branch block.  No acute injury pattern. [BT]   1518 HEMOGLOBIN(!!): 5.0  Hemoglobin 5 [BT]   1518 INR(!!): 7.2  INR 7.2 [BT]   1625 HEMOGLOBIN(!!): 4.5  Repeat hemoglobin 4.5 [BT]   1822 Given vitamin K 5mg PO [BT]   1824 I spoke with Avis Velazquez NP on GI service at [facility].  Patient can have EGD and/or colonoscopy at [facility] if needed.  Placed NPO. [BT]   Wed Jul 02, 2025   1154 Tentative plan for transfer to [facility].  Care transferred over to Dr. Young at shift change.  Disposition pending acceptance and transport to accepting facility. [BT]   1156 CT chest abdomen pelvis w IV contrast  Nothing acute on CT chest abdomen and pelvis [BT]   1156 CT head wo IV contrast  No ICH or other acute findings on CT head. [BT]      ED Course User Index  [BT] Wood Mensah DO         Diagnoses as of 07/02/25 1156   Altered mental status, unspecified altered mental status type   Nausea   Urinary tract infection without  hematuria, site unspecified   Warfarin-induced coagulopathy (Multi)   Symptomatic anemia     CT chest abdomen pelvis w IV contrast   Final Result   CHEST:   1. Scarring and/or atelectasis at the lung bases bilaterally.   2. No pleural effusion or pneumothorax identified.        ABDOMEN-PELVIS:   1. Hypodensities in the liver, most consistent with cysts.   2. Colonic diverticulosis, without evidence of acute diverticulitis.   3. No evidence of bowel obstruction, free intraperitoneal air or   abnormal intra-abdominal fluid collection.        MACRO:   None        Signed by: Zain Chandler 7/1/2025 3:46 PM   Dictation workstation:   [id number]      CT head wo IV contrast   Final Result   No evidence of acute cortical infarct or intracranial hemorrhage.        MACRO:   None             Signed by: Zain Chandler 7/1/2025 3:31 PM   Dictation workstation:   [id number]                    No data recorded     Reinaldo Coma Scale Score: 14 (07/01/25 1324 : Mayte Boothe)                           Medical Decision Making      Procedure  Procedures       [1]   Past Surgical History:  Procedure Laterality Date    TOTAL KNEE ARTHROPLASTY Left    [2] No family history on file.  [3]   Social History  Tobacco Use    Smoking status: Never     Passive exposure: Never    Smokeless tobacco: Never   Substance Use Topics    Alcohol use: Never    Drug use: Never        Wood Mensah DO  07/02/25 1156

## 2025-07-02 ENCOUNTER — APPOINTMENT (OUTPATIENT)
Dept: VASCULAR MEDICINE | Facility: HOSPITAL | Age: 89
End: 2025-07-02
Payer: MEDICARE

## 2025-07-02 ENCOUNTER — HOSPITAL ENCOUNTER (INPATIENT)
Facility: HOSPITAL | Age: 89
End: 2025-07-02
Attending: INTERNAL MEDICINE | Admitting: INTERNAL MEDICINE
Payer: MEDICARE

## 2025-07-02 VITALS
RESPIRATION RATE: 15 BRPM | BODY MASS INDEX: 33.15 KG/M2 | DIASTOLIC BLOOD PRESSURE: 53 MMHG | TEMPERATURE: 98 F | WEIGHT: 168.87 LBS | HEART RATE: 64 BPM | OXYGEN SATURATION: 98 % | HEIGHT: 60 IN | SYSTOLIC BLOOD PRESSURE: 119 MMHG

## 2025-07-02 DIAGNOSIS — I82.451 ACUTE DEEP VEIN THROMBOSIS (DVT) OF RIGHT PERONEAL VEIN (MULTI): ICD-10-CM

## 2025-07-02 DIAGNOSIS — Z86.718 HISTORY OF DVT OF LOWER EXTREMITY: ICD-10-CM

## 2025-07-02 DIAGNOSIS — D62 ACUTE BLOOD LOSS ANEMIA: Primary | ICD-10-CM

## 2025-07-02 DIAGNOSIS — K92.2 GI BLEED: ICD-10-CM

## 2025-07-02 DIAGNOSIS — R06.02 SHORTNESS OF BREATH: ICD-10-CM

## 2025-07-02 DIAGNOSIS — R60.0 BILATERAL LEG EDEMA: ICD-10-CM

## 2025-07-02 DIAGNOSIS — D68.9 COAGULOPATHY (MULTI): ICD-10-CM

## 2025-07-02 LAB
ABO GROUP (TYPE) IN BLOOD: NORMAL
ABO GROUP (TYPE) IN BLOOD: NORMAL
ANION GAP SERPL CALC-SCNC: 15 MMOL/L (ref 10–20)
ANTIBODY SCREEN: NORMAL
APPEARANCE UR: CLEAR
ATRIAL RATE: 81 BPM
BILIRUB UR STRIP.AUTO-MCNC: NEGATIVE MG/DL
BLOOD EXPIRATION DATE: NORMAL
BNP SERPL-MCNC: 1098 PG/ML (ref 0–99)
BUN SERPL-MCNC: 11 MG/DL (ref 6–23)
CALCIUM SERPL-MCNC: 9.1 MG/DL (ref 8.6–10.3)
CHLORIDE SERPL-SCNC: 103 MMOL/L (ref 98–107)
CO2 SERPL-SCNC: 23 MMOL/L (ref 21–32)
COLOR UR: YELLOW
CREAT SERPL-MCNC: 0.89 MG/DL (ref 0.5–1.05)
DISPENSE STATUS: NORMAL
EGFRCR SERPLBLD CKD-EPI 2021: 62 ML/MIN/1.73M*2
ERYTHROCYTE [DISTWIDTH] IN BLOOD BY AUTOMATED COUNT: 19.9 % (ref 11.5–14.5)
ERYTHROCYTE [DISTWIDTH] IN BLOOD BY AUTOMATED COUNT: 20.2 % (ref 11.5–14.5)
ERYTHROCYTE [DISTWIDTH] IN BLOOD BY AUTOMATED COUNT: 20.3 % (ref 11.5–14.5)
EST. AVERAGE GLUCOSE BLD GHB EST-MCNC: 123 MG/DL
FERRITIN SERPL-MCNC: 25 NG/ML (ref 8–150)
GLUCOSE BLD MANUAL STRIP-MCNC: 119 MG/DL (ref 74–99)
GLUCOSE BLD MANUAL STRIP-MCNC: 121 MG/DL (ref 74–99)
GLUCOSE BLD MANUAL STRIP-MCNC: 151 MG/DL (ref 74–99)
GLUCOSE BLD MANUAL STRIP-MCNC: 161 MG/DL (ref 74–99)
GLUCOSE BLD MANUAL STRIP-MCNC: 161 MG/DL (ref 74–99)
GLUCOSE SERPL-MCNC: 107 MG/DL (ref 74–99)
GLUCOSE UR STRIP.AUTO-MCNC: NORMAL MG/DL
HBA1C MFR BLD: 5.9 % (ref ?–5.7)
HCT VFR BLD AUTO: 22.5 % (ref 36–46)
HCT VFR BLD AUTO: 24.8 % (ref 36–46)
HCT VFR BLD AUTO: 26.2 % (ref 36–46)
HGB BLD-MCNC: 6.6 G/DL (ref 12–16)
HGB BLD-MCNC: 7.4 G/DL (ref 12–16)
HGB BLD-MCNC: 7.4 G/DL (ref 12–16)
HOLD SPECIMEN: NORMAL
INR PPP: 2.8 (ref 0.9–1.1)
INR PPP: 3.8 (ref 0.9–1.1)
IRON SATN MFR SERPL: 30 % (ref 25–45)
IRON SERPL-MCNC: 110 UG/DL (ref 35–150)
KETONES UR STRIP.AUTO-MCNC: NEGATIVE MG/DL
LEUKOCYTE ESTERASE UR QL STRIP.AUTO: NEGATIVE
MCH RBC QN AUTO: 21.3 PG (ref 26–34)
MCH RBC QN AUTO: 21.3 PG (ref 26–34)
MCH RBC QN AUTO: 21.7 PG (ref 26–34)
MCHC RBC AUTO-ENTMCNC: 28.2 G/DL (ref 32–36)
MCHC RBC AUTO-ENTMCNC: 29.3 G/DL (ref 32–36)
MCHC RBC AUTO-ENTMCNC: 29.8 G/DL (ref 32–36)
MCV RBC AUTO: 73 FL (ref 80–100)
MCV RBC AUTO: 73 FL (ref 80–100)
MCV RBC AUTO: 76 FL (ref 80–100)
NITRITE UR QL STRIP.AUTO: NEGATIVE
NRBC BLD-RTO: 0 /100 WBCS (ref 0–0)
NRBC BLD-RTO: 0 /100 WBCS (ref 0–0)
NRBC BLD-RTO: 0.3 /100 WBCS (ref 0–0)
P AXIS: 73 DEGREES
P OFFSET: 169 MS
P ONSET: 98 MS
PH UR STRIP.AUTO: 6 [PH]
PLATELET # BLD AUTO: 272 X10*3/UL (ref 150–450)
PLATELET # BLD AUTO: 307 X10*3/UL (ref 150–450)
PLATELET # BLD AUTO: 311 X10*3/UL (ref 150–450)
POTASSIUM SERPL-SCNC: 3.3 MMOL/L (ref 3.5–5.3)
PR INTERVAL: 228 MS
PRODUCT BLOOD TYPE: 8400
PRODUCT CODE: NORMAL
PROT UR STRIP.AUTO-MCNC: NEGATIVE MG/DL
PROTHROMBIN TIME: 31.4 SECONDS (ref 9.8–12.4)
PROTHROMBIN TIME: 42 SECONDS (ref 9.8–12.4)
Q ONSET: 212 MS
QRS COUNT: 13 BEATS
QRS DURATION: 166 MS
QT INTERVAL: 430 MS
QTC CALCULATION(BAZETT): 499 MS
QTC FREDERICIA: 475 MS
R AXIS: -48 DEGREES
RBC # BLD AUTO: 3.1 X10*6/UL (ref 4–5.2)
RBC # BLD AUTO: 3.41 X10*6/UL (ref 4–5.2)
RBC # BLD AUTO: 3.47 X10*6/UL (ref 4–5.2)
RBC # UR STRIP.AUTO: NEGATIVE MG/DL
RH FACTOR (ANTIGEN D): NORMAL
RH FACTOR (ANTIGEN D): NORMAL
SODIUM SERPL-SCNC: 138 MMOL/L (ref 136–145)
SP GR UR STRIP.AUTO: 1.05
T AXIS: 92 DEGREES
T OFFSET: 427 MS
TIBC SERPL-MCNC: 363 UG/DL (ref 240–445)
UIBC SERPL-MCNC: 253 UG/DL (ref 110–370)
UNIT ABO: NORMAL
UNIT NUMBER: NORMAL
UNIT RH: NORMAL
UNIT VOLUME: 202
UROBILINOGEN UR STRIP.AUTO-MCNC: NORMAL MG/DL
VENTRICULAR RATE: 81 BPM
WBC # BLD AUTO: 6.3 X10*3/UL (ref 4.4–11.3)
WBC # BLD AUTO: 6.8 X10*3/UL (ref 4.4–11.3)
WBC # BLD AUTO: 7.3 X10*3/UL (ref 4.4–11.3)

## 2025-07-02 PROCEDURE — 82728 ASSAY OF FERRITIN: CPT

## 2025-07-02 PROCEDURE — 93970 EXTREMITY STUDY: CPT

## 2025-07-02 PROCEDURE — 82947 ASSAY GLUCOSE BLOOD QUANT: CPT

## 2025-07-02 PROCEDURE — 36415 COLL VENOUS BLD VENIPUNCTURE: CPT | Performed by: INTERNAL MEDICINE

## 2025-07-02 PROCEDURE — 86850 RBC ANTIBODY SCREEN: CPT | Performed by: INTERNAL MEDICINE

## 2025-07-02 PROCEDURE — 2500000004 HC RX 250 GENERAL PHARMACY W/ HCPCS (ALT 636 FOR OP/ED)

## 2025-07-02 PROCEDURE — 2500000002 HC RX 250 W HCPCS SELF ADMINISTERED DRUGS (ALT 637 FOR MEDICARE OP, ALT 636 FOR OP/ED): Performed by: FAMILY MEDICINE

## 2025-07-02 PROCEDURE — 85027 COMPLETE CBC AUTOMATED: CPT | Performed by: FAMILY MEDICINE

## 2025-07-02 PROCEDURE — 86900 BLOOD TYPING SEROLOGIC ABO: CPT | Performed by: INTERNAL MEDICINE

## 2025-07-02 PROCEDURE — 83880 ASSAY OF NATRIURETIC PEPTIDE: CPT | Performed by: FAMILY MEDICINE

## 2025-07-02 PROCEDURE — 81003 URINALYSIS AUTO W/O SCOPE: CPT | Performed by: INTERNAL MEDICINE

## 2025-07-02 PROCEDURE — 2060000001 HC INTERMEDIATE ICU ROOM DAILY

## 2025-07-02 PROCEDURE — 2500000005 HC RX 250 GENERAL PHARMACY W/O HCPCS: Performed by: INTERNAL MEDICINE

## 2025-07-02 PROCEDURE — 2500000001 HC RX 250 WO HCPCS SELF ADMINISTERED DRUGS (ALT 637 FOR MEDICARE OP): Performed by: FAMILY MEDICINE

## 2025-07-02 PROCEDURE — 2500000002 HC RX 250 W HCPCS SELF ADMINISTERED DRUGS (ALT 637 FOR MEDICARE OP, ALT 636 FOR OP/ED): Performed by: INTERNAL MEDICINE

## 2025-07-02 PROCEDURE — 83540 ASSAY OF IRON: CPT

## 2025-07-02 PROCEDURE — 36415 COLL VENOUS BLD VENIPUNCTURE: CPT | Performed by: FAMILY MEDICINE

## 2025-07-02 PROCEDURE — 85610 PROTHROMBIN TIME: CPT | Performed by: INTERNAL MEDICINE

## 2025-07-02 PROCEDURE — 30233N1 TRANSFUSION OF NONAUTOLOGOUS RED BLOOD CELLS INTO PERIPHERAL VEIN, PERCUTANEOUS APPROACH: ICD-10-PCS | Performed by: INTERNAL MEDICINE

## 2025-07-02 PROCEDURE — 85027 COMPLETE CBC AUTOMATED: CPT | Performed by: INTERNAL MEDICINE

## 2025-07-02 PROCEDURE — 83036 HEMOGLOBIN GLYCOSYLATED A1C: CPT | Mod: GEALAB

## 2025-07-02 PROCEDURE — 80048 BASIC METABOLIC PNL TOTAL CA: CPT | Performed by: INTERNAL MEDICINE

## 2025-07-02 PROCEDURE — 2500000004 HC RX 250 GENERAL PHARMACY W/ HCPCS (ALT 636 FOR OP/ED): Performed by: INTERNAL MEDICINE

## 2025-07-02 PROCEDURE — 83550 IRON BINDING TEST: CPT

## 2025-07-02 PROCEDURE — 93970 EXTREMITY STUDY: CPT | Performed by: SURGERY

## 2025-07-02 PROCEDURE — 86923 COMPATIBILITY TEST ELECTRIC: CPT

## 2025-07-02 PROCEDURE — 99222 1ST HOSP IP/OBS MODERATE 55: CPT | Performed by: INTERNAL MEDICINE

## 2025-07-02 PROCEDURE — 85610 PROTHROMBIN TIME: CPT | Performed by: FAMILY MEDICINE

## 2025-07-02 PROCEDURE — 99223 1ST HOSP IP/OBS HIGH 75: CPT | Performed by: INTERNAL MEDICINE

## 2025-07-02 RX ORDER — METOPROLOL TARTRATE 25 MG/1
25 TABLET, FILM COATED ORAL 2 TIMES DAILY
Status: DISPENSED | OUTPATIENT
Start: 2025-07-02

## 2025-07-02 RX ORDER — POLYETHYLENE GLYCOL 3350 17 G/17G
17 POWDER, FOR SOLUTION ORAL DAILY PRN
Status: DISPENSED | OUTPATIENT
Start: 2025-07-02

## 2025-07-02 RX ORDER — ONDANSETRON HYDROCHLORIDE 2 MG/ML
4 INJECTION, SOLUTION INTRAVENOUS EVERY 8 HOURS PRN
Status: ACTIVE | OUTPATIENT
Start: 2025-07-02

## 2025-07-02 RX ORDER — POTASSIUM CHLORIDE 20 MEQ/1
40 TABLET, EXTENDED RELEASE ORAL ONCE
Status: COMPLETED | OUTPATIENT
Start: 2025-07-02 | End: 2025-07-02

## 2025-07-02 RX ORDER — LANOLIN ALCOHOL/MO/W.PET/CERES
50 CREAM (GRAM) TOPICAL 2 TIMES DAILY
Status: DISPENSED | OUTPATIENT
Start: 2025-07-02

## 2025-07-02 RX ORDER — OXYBUTYNIN CHLORIDE 5 MG/1
5 TABLET ORAL 2 TIMES DAILY
Status: DISPENSED | OUTPATIENT
Start: 2025-07-02

## 2025-07-02 RX ORDER — PANTOPRAZOLE SODIUM 40 MG/10ML
40 INJECTION, POWDER, LYOPHILIZED, FOR SOLUTION INTRAVENOUS 2 TIMES DAILY
Status: DISPENSED | OUTPATIENT
Start: 2025-07-02

## 2025-07-02 RX ORDER — ONDANSETRON 4 MG/1
4 TABLET, FILM COATED ORAL EVERY 8 HOURS PRN
Status: ACTIVE | OUTPATIENT
Start: 2025-07-02

## 2025-07-02 RX ORDER — FUROSEMIDE 40 MG/1
40 TABLET ORAL DAILY
Status: DISPENSED | OUTPATIENT
Start: 2025-07-03

## 2025-07-02 RX ORDER — FLUTICASONE PROPIONATE 50 MCG
1 SPRAY, SUSPENSION (ML) NASAL DAILY PRN
Status: ACTIVE | OUTPATIENT
Start: 2025-07-02

## 2025-07-02 RX ORDER — TALC
3 POWDER (GRAM) TOPICAL NIGHTLY PRN
Status: DISPENSED | OUTPATIENT
Start: 2025-07-02

## 2025-07-02 RX ORDER — DEXTROSE 50 % IN WATER (D50W) INTRAVENOUS SYRINGE
25
Status: ACTIVE | OUTPATIENT
Start: 2025-07-02

## 2025-07-02 RX ORDER — INSULIN LISPRO 100 [IU]/ML
0-5 INJECTION, SOLUTION INTRAVENOUS; SUBCUTANEOUS EVERY 4 HOURS
Status: DISCONTINUED | OUTPATIENT
Start: 2025-07-02 | End: 2025-07-03

## 2025-07-02 RX ORDER — POLYETHYLENE GLYCOL 3350 17 G/17G
17 POWDER, FOR SOLUTION ORAL DAILY
Status: DISPENSED | OUTPATIENT
Start: 2025-07-02

## 2025-07-02 RX ORDER — ACETAMINOPHEN 650 MG/1
650 SUPPOSITORY RECTAL EVERY 4 HOURS PRN
Status: ACTIVE | OUTPATIENT
Start: 2025-07-02

## 2025-07-02 RX ORDER — ACETAMINOPHEN 160 MG/5ML
650 SOLUTION ORAL EVERY 4 HOURS PRN
Status: ACTIVE | OUTPATIENT
Start: 2025-07-02

## 2025-07-02 RX ORDER — ACETAMINOPHEN 325 MG/1
650 TABLET ORAL EVERY 4 HOURS PRN
Status: ACTIVE | OUTPATIENT
Start: 2025-07-02

## 2025-07-02 RX ORDER — FUROSEMIDE 10 MG/ML
20 INJECTION INTRAMUSCULAR; INTRAVENOUS ONCE
Status: COMPLETED | OUTPATIENT
Start: 2025-07-02 | End: 2025-07-02

## 2025-07-02 RX ORDER — DEXTROSE 50 % IN WATER (D50W) INTRAVENOUS SYRINGE
12.5
Status: ACTIVE | OUTPATIENT
Start: 2025-07-02

## 2025-07-02 RX ORDER — CETIRIZINE HYDROCHLORIDE 10 MG/1
10 TABLET ORAL DAILY
Status: DISPENSED | OUTPATIENT
Start: 2025-07-02

## 2025-07-02 RX ORDER — CALCIUM CARBONATE 200(500)MG
1 TABLET,CHEWABLE ORAL 4 TIMES DAILY PRN
Status: ACTIVE | OUTPATIENT
Start: 2025-07-02

## 2025-07-02 RX ORDER — ENALAPRIL MALEATE 5 MG/1
20 TABLET ORAL DAILY
Status: ACTIVE | OUTPATIENT
Start: 2025-07-02

## 2025-07-02 RX ADMIN — INSULIN LISPRO 1 UNITS: 100 INJECTION, SOLUTION INTRAVENOUS; SUBCUTANEOUS at 20:43

## 2025-07-02 RX ADMIN — FUROSEMIDE 20 MG: 10 INJECTION, SOLUTION INTRAMUSCULAR; INTRAVENOUS at 09:54

## 2025-07-02 RX ADMIN — CETIRIZINE HYDROCHLORIDE 10 MG: 10 TABLET, FILM COATED ORAL at 18:14

## 2025-07-02 RX ADMIN — INSULIN LISPRO 1 UNITS: 100 INJECTION, SOLUTION INTRAVENOUS; SUBCUTANEOUS at 16:30

## 2025-07-02 RX ADMIN — PANTOPRAZOLE SODIUM 40 MG: 40 INJECTION, POWDER, FOR SOLUTION INTRAVENOUS at 20:44

## 2025-07-02 RX ADMIN — FUROSEMIDE 20 MG: 10 INJECTION, SOLUTION INTRAMUSCULAR; INTRAVENOUS at 13:31

## 2025-07-02 RX ADMIN — INSULIN LISPRO 1 UNITS: 100 INJECTION, SOLUTION INTRAVENOUS; SUBCUTANEOUS at 09:45

## 2025-07-02 RX ADMIN — POTASSIUM CHLORIDE 40 MEQ: 1500 TABLET, EXTENDED RELEASE ORAL at 09:54

## 2025-07-02 RX ADMIN — Medication 3 MG: at 20:45

## 2025-07-02 RX ADMIN — OXYBUTYNIN CHLORIDE 5 MG: 5 TABLET ORAL at 20:45

## 2025-07-02 RX ADMIN — POLYETHYLENE GLYCOL 3350 17 G: 17 POWDER, FOR SOLUTION ORAL at 20:47

## 2025-07-02 RX ADMIN — PYRIDOXINE HCL TAB 50 MG 50 MG: 50 TAB at 20:44

## 2025-07-02 RX ADMIN — PANTOPRAZOLE SODIUM 40 MG: 40 INJECTION, POWDER, FOR SOLUTION INTRAVENOUS at 06:29

## 2025-07-02 SDOH — SOCIAL STABILITY: SOCIAL INSECURITY: DOES ANYONE TRY TO KEEP YOU FROM HAVING/CONTACTING OTHER FRIENDS OR DOING THINGS OUTSIDE YOUR HOME?: UNABLE TO ASSESS

## 2025-07-02 SDOH — HEALTH STABILITY: MENTAL HEALTH: HOW OFTEN DO YOU HAVE A DRINK CONTAINING ALCOHOL?: PATIENT DECLINED

## 2025-07-02 SDOH — SOCIAL STABILITY: SOCIAL INSECURITY: HAVE YOU HAD THOUGHTS OF HARMING ANYONE ELSE?: UNABLE TO ASSESS

## 2025-07-02 SDOH — ECONOMIC STABILITY: FOOD INSECURITY: WITHIN THE PAST 12 MONTHS, THE FOOD YOU BOUGHT JUST DIDN'T LAST AND YOU DIDN'T HAVE MONEY TO GET MORE.: PATIENT DECLINED

## 2025-07-02 SDOH — ECONOMIC STABILITY: TRANSPORTATION INSECURITY
IN THE PAST 12 MONTHS, HAS LACK OF TRANSPORTATION KEPT YOU FROM MEDICAL APPOINTMENTS OR FROM GETTING MEDICATIONS?: PATIENT DECLINED

## 2025-07-02 SDOH — ECONOMIC STABILITY: INCOME INSECURITY
IN THE PAST 12 MONTHS HAS THE ELECTRIC, GAS, OIL, OR WATER COMPANY THREATENED TO SHUT OFF SERVICES IN YOUR HOME?: PATIENT DECLINED

## 2025-07-02 SDOH — HEALTH STABILITY: MENTAL HEALTH: HOW OFTEN DO YOU HAVE SIX OR MORE DRINKS ON ONE OCCASION?: PATIENT DECLINED

## 2025-07-02 SDOH — ECONOMIC STABILITY: HOUSING INSECURITY: IN THE LAST 12 MONTHS, WAS THERE A TIME WHEN YOU WERE NOT ABLE TO PAY THE MORTGAGE OR RENT ON TIME?: PATIENT DECLINED

## 2025-07-02 SDOH — SOCIAL STABILITY: SOCIAL INSECURITY: DO YOU FEEL UNSAFE GOING BACK TO THE PLACE WHERE YOU ARE LIVING?: UNABLE TO ASSESS

## 2025-07-02 SDOH — ECONOMIC STABILITY: FOOD INSECURITY: HOW HARD IS IT FOR YOU TO PAY FOR THE VERY BASICS LIKE FOOD, HOUSING, MEDICAL CARE, AND HEATING?: PATIENT DECLINED

## 2025-07-02 SDOH — SOCIAL STABILITY: SOCIAL INSECURITY: DO YOU FEEL ANYONE HAS EXPLOITED OR TAKEN ADVANTAGE OF YOU FINANCIALLY OR OF YOUR PERSONAL PROPERTY?: UNABLE TO ASSESS

## 2025-07-02 SDOH — HEALTH STABILITY: MENTAL HEALTH: HOW MANY DRINKS CONTAINING ALCOHOL DO YOU HAVE ON A TYPICAL DAY WHEN YOU ARE DRINKING?: PATIENT DECLINED

## 2025-07-02 SDOH — SOCIAL STABILITY: SOCIAL INSECURITY: HAVE YOU HAD ANY THOUGHTS OF HARMING ANYONE ELSE?: UNABLE TO ASSESS

## 2025-07-02 SDOH — SOCIAL STABILITY: SOCIAL INSECURITY
WITHIN THE LAST YEAR, HAVE YOU BEEN HUMILIATED OR EMOTIONALLY ABUSED IN OTHER WAYS BY YOUR PARTNER OR EX-PARTNER?: PATIENT DECLINED

## 2025-07-02 SDOH — SOCIAL STABILITY: SOCIAL INSECURITY: WITHIN THE LAST YEAR, HAVE YOU BEEN AFRAID OF YOUR PARTNER OR EX-PARTNER?: PATIENT DECLINED

## 2025-07-02 SDOH — ECONOMIC STABILITY: HOUSING INSECURITY: AT ANY TIME IN THE PAST 12 MONTHS, WERE YOU HOMELESS OR LIVING IN A SHELTER (INCLUDING NOW)?: PATIENT DECLINED

## 2025-07-02 SDOH — SOCIAL STABILITY: SOCIAL INSECURITY
WITHIN THE LAST YEAR, HAVE YOU BEEN KICKED, HIT, SLAPPED, OR OTHERWISE PHYSICALLY HURT BY YOUR PARTNER OR EX-PARTNER?: PATIENT DECLINED

## 2025-07-02 SDOH — SOCIAL STABILITY: SOCIAL INSECURITY: ARE THERE ANY APPARENT SIGNS OF INJURIES/BEHAVIORS THAT COULD BE RELATED TO ABUSE/NEGLECT?: UNABLE TO ASSESS

## 2025-07-02 SDOH — ECONOMIC STABILITY: HOUSING INSECURITY: IN THE PAST 12 MONTHS, HOW MANY TIMES HAVE YOU MOVED WHERE YOU WERE LIVING?: 0

## 2025-07-02 SDOH — SOCIAL STABILITY: SOCIAL INSECURITY: ARE YOU OR HAVE YOU BEEN THREATENED OR ABUSED PHYSICALLY, EMOTIONALLY, OR SEXUALLY BY ANYONE?: UNABLE TO ASSESS

## 2025-07-02 SDOH — ECONOMIC STABILITY: FOOD INSECURITY
WITHIN THE PAST 12 MONTHS, YOU WORRIED THAT YOUR FOOD WOULD RUN OUT BEFORE YOU GOT THE MONEY TO BUY MORE.: PATIENT DECLINED

## 2025-07-02 SDOH — SOCIAL STABILITY: SOCIAL INSECURITY: HAS ANYONE EVER THREATENED TO HURT YOUR FAMILY OR YOUR PETS?: UNABLE TO ASSESS

## 2025-07-02 SDOH — SOCIAL STABILITY: SOCIAL INSECURITY: ABUSE: ADULT

## 2025-07-02 SDOH — SOCIAL STABILITY: SOCIAL INSECURITY
WITHIN THE LAST YEAR, HAVE YOU BEEN RAPED OR FORCED TO HAVE ANY KIND OF SEXUAL ACTIVITY BY YOUR PARTNER OR EX-PARTNER?: PATIENT DECLINED

## 2025-07-02 SDOH — SOCIAL STABILITY: SOCIAL INSECURITY: WERE YOU ABLE TO COMPLETE ALL THE BEHAVIORAL HEALTH SCREENINGS?: YES

## 2025-07-02 ASSESSMENT — COGNITIVE AND FUNCTIONAL STATUS - GENERAL
MOVING TO AND FROM BED TO CHAIR: A LOT
CLIMB 3 TO 5 STEPS WITH RAILING: TOTAL
TOILETING: A LOT
STANDING UP FROM CHAIR USING ARMS: A LOT
TOILETING: A LOT
DRESSING REGULAR LOWER BODY CLOTHING: A LITTLE
DRESSING REGULAR UPPER BODY CLOTHING: A LITTLE
WALKING IN HOSPITAL ROOM: TOTAL
PERSONAL GROOMING: A LITTLE
MOVING TO AND FROM BED TO CHAIR: A LITTLE
DRESSING REGULAR UPPER BODY CLOTHING: A LITTLE
DRESSING REGULAR UPPER BODY CLOTHING: A LITTLE
MOVING FROM LYING ON BACK TO SITTING ON SIDE OF FLAT BED WITH BEDRAILS: A LITTLE
CLIMB 3 TO 5 STEPS WITH RAILING: TOTAL
TOILETING: A LITTLE
WALKING IN HOSPITAL ROOM: A LOT
TURNING FROM BACK TO SIDE WHILE IN FLAT BAD: A LITTLE
DAILY ACTIVITIY SCORE: 17
STANDING UP FROM CHAIR USING ARMS: A LOT
TURNING FROM BACK TO SIDE WHILE IN FLAT BAD: A LITTLE
MOVING TO AND FROM BED TO CHAIR: A LOT
HELP NEEDED FOR BATHING: A LITTLE
EATING MEALS: A LITTLE
PERSONAL GROOMING: A LITTLE
PERSONAL GROOMING: A LITTLE
MOBILITY SCORE: 13
HELP NEEDED FOR BATHING: A LITTLE
DRESSING REGULAR LOWER BODY CLOTHING: A LITTLE
EATING MEALS: A LITTLE
MOVING FROM LYING ON BACK TO SITTING ON SIDE OF FLAT BED WITH BEDRAILS: A LITTLE
PATIENT BASELINE BEDBOUND: NO
MOVING FROM LYING ON BACK TO SITTING ON SIDE OF FLAT BED WITH BEDRAILS: A LITTLE
MOBILITY SCORE: 18
CLIMB 3 TO 5 STEPS WITH RAILING: A LITTLE
HELP NEEDED FOR BATHING: A LITTLE
MOBILITY SCORE: 12
DAILY ACTIVITIY SCORE: 17
DAILY ACTIVITIY SCORE: 18
WALKING IN HOSPITAL ROOM: A LITTLE
TURNING FROM BACK TO SIDE WHILE IN FLAT BAD: A LITTLE
STANDING UP FROM CHAIR USING ARMS: A LITTLE
DRESSING REGULAR LOWER BODY CLOTHING: A LITTLE
EATING MEALS: A LITTLE

## 2025-07-02 ASSESSMENT — LIFESTYLE VARIABLES
SKIP TO QUESTIONS 9-10: 0
AUDIT-C TOTAL SCORE: -1
HOW MANY STANDARD DRINKS CONTAINING ALCOHOL DO YOU HAVE ON A TYPICAL DAY: PATIENT DECLINED
HOW OFTEN DO YOU HAVE A DRINK CONTAINING ALCOHOL: PATIENT DECLINED
SKIP TO QUESTIONS 9-10: 0
AUDIT-C TOTAL SCORE: -1
HOW OFTEN DO YOU HAVE 6 OR MORE DRINKS ON ONE OCCASION: PATIENT DECLINED
AUDIT-C TOTAL SCORE: -1

## 2025-07-02 ASSESSMENT — ACTIVITIES OF DAILY LIVING (ADL)
GROOMING: NEEDS ASSISTANCE
DRESSING YOURSELF: NEEDS ASSISTANCE
FEEDING YOURSELF: INDEPENDENT
ASSISTIVE_DEVICE: WALKER
HEARING - LEFT EAR: FUNCTIONAL
HEARING - RIGHT EAR: FUNCTIONAL
LACK_OF_TRANSPORTATION: PATIENT DECLINED
JUDGMENT_ADEQUATE_SAFELY_COMPLETE_DAILY_ACTIVITIES: NO
LACK_OF_TRANSPORTATION: NO
PATIENT'S MEMORY ADEQUATE TO SAFELY COMPLETE DAILY ACTIVITIES?: NO
BATHING: NEEDS ASSISTANCE
ADEQUATE_TO_COMPLETE_ADL: YES
TOILETING: NEEDS ASSISTANCE
WALKS IN HOME: NEEDS ASSISTANCE

## 2025-07-02 ASSESSMENT — PAIN SCALES - GENERAL
PAINLEVEL_OUTOF10: 0 - NO PAIN

## 2025-07-02 ASSESSMENT — PAIN - FUNCTIONAL ASSESSMENT: PAIN_FUNCTIONAL_ASSESSMENT: 0-10

## 2025-07-02 NOTE — H&P
History Of Present Illness  Vanessa Adame is a 89 y.o. female with a past medical history of DVT on Coumadin, anxiety, CHF, CKD, dementia, diabetes mellitus type 2, nocturnal hypoxia (patient wears 1 to 2 L of oxygen at nighttime), hyperlipidemia, hypertension, hypokalemia, overactive bladder, spinal stenosis and UTI who was sent to the hospital for management of generalized weakness and increased confusion which has been going on for the past 2 days.  There was associated nausea but no vomiting.  There is no reported fall.  Patient is currently being managed for UTI on nitrofurantoin.  She is a poor historian.  She does not know why she is here.  History was obtained from the chart, the ED physician, nursing staff, and from her son.  Patient currently denies any pain.  She is a resident at [facility].    Past Medical History  She has a past medical history of Anxiety, CHF (congestive heart failure), Chronic osteoarthritis, Chronic renal disease, stage III (Multi), Dementia with behavioral problem (Multi), Diabetes mellitus (Multi), Diverticulosis of intestine without perforation or abscess without bleeding, History of fall, Hyperlipemia, Hypertension, Hypokalemia, Lack of coordination, Overactive bladder, Senile dementia, uncomplicated (Multi), Spinal stenosis, Unsteady gait, and Weakness generalized.    Surgical History  She has a past surgical history that includes Total knee arthroplasty (Left).     Social History  She reports that she has never smoked. She has never been exposed to tobacco smoke. She has never used smokeless tobacco. She reports that she does not drink alcohol and does not use drugs.  Full code    Family History  Family History[1]     Allergies  Amoxapine, Amoxicillin, Penicillin, and Sulfa (sulfonamide antibiotics)    Medications  Scheduled medications  Scheduled Medications[2]  Continuous medications  Continuous Medications[3]  PRN medications  PRN  Medications[4]    Review of systems: 10-point review of systems is negative.     Physical Exam  Constitutional: Awake, oriented in person and place, no acute distress  Skin: warm and dry  Head/Neck: Normocephalic, atraumatic  Eyes: clear sclera  ENMT: mucous membranes moist  Cardio: Regular rate and rhythm  Resp: CTA bilaterally, good respiratory effort  Gastrointestinal: Soft, nontender, nondistended, bowel sounds present  Musculoskeletal: ROM intact, no joint swelling  Extremities: Bilateral lower extremity edema  Neuro: Demented.  Patient moves all limbs against resistance.  Psychological: Demented     Last Recorded Vitals  /58 (BP Location: Left arm, Patient Position: Lying)   Pulse 74   Temp 36.4 °C (97.5 °F) (Temporal)   Resp 21   Wt 77.1 kg (170 lb)   SpO2 99%     Relevant Results  Results for orders placed or performed during the hospital encounter of 07/01/25 (from the past 24 hours)   Light Blue Top   Result Value Ref Range    Extra Tube Hold for add-ons.    PST Top   Result Value Ref Range    Extra Tube Hold for add-ons.    Lavender Top   Result Value Ref Range    Extra Tube Hold for add-ons.    SST TOP   Result Value Ref Range    Extra Tube Hold for add-ons.    Gray Top   Result Value Ref Range    Extra Tube Hold for add-ons.    CBC and Auto Differential   Result Value Ref Range    WBC 6.5 4.4 - 11.3 x10*3/uL    nRBC 0.0 0.0 - 0.0 /100 WBCs    RBC 2.77 (L) 4.00 - 5.20 x10*6/uL    Hemoglobin 5.0 (LL) 12.0 - 16.0 g/dL    Hematocrit 19.4 (L) 36.0 - 46.0 %    MCV 70 (L) 80 - 100 fL    MCH 18.1 (L) 26.0 - 34.0 pg    MCHC 25.8 (L) 32.0 - 36.0 g/dL    RDW 19.6 (H) 11.5 - 14.5 %    Platelets 337 150 - 450 x10*3/uL    Neutrophils % 73.5 40.0 - 80.0 %    Immature Granulocytes %, Automated 0.6 0.0 - 0.9 %    Lymphocytes % 14.6 13.0 - 44.0 %    Monocytes % 9.5 2.0 - 10.0 %    Eosinophils % 1.2 0.0 - 6.0 %    Basophils % 0.6 0.0 - 2.0 %    Neutrophils Absolute 4.77 1.60 - 5.50 x10*3/uL    Immature  Granulocytes Absolute, Automated 0.04 0.00 - 0.50 x10*3/uL    Lymphocytes Absolute 0.95 0.80 - 3.00 x10*3/uL    Monocytes Absolute 0.62 0.05 - 0.80 x10*3/uL    Eosinophils Absolute 0.08 0.00 - 0.40 x10*3/uL    Basophils Absolute 0.04 0.00 - 0.10 x10*3/uL   Comprehensive metabolic panel   Result Value Ref Range    Glucose 141 (H) 74 - 99 mg/dL    Sodium 134 (L) 136 - 145 mmol/L    Potassium 3.8 3.5 - 5.3 mmol/L    Chloride 101 98 - 107 mmol/L    Bicarbonate 25 21 - 32 mmol/L    Anion Gap 12 10 - 20 mmol/L    Urea Nitrogen 14 6 - 23 mg/dL    Creatinine 0.95 0.50 - 1.05 mg/dL    eGFR 58 (L) >60 mL/min/1.73m*2    Calcium 10.0 8.6 - 10.3 mg/dL    Albumin 3.9 3.4 - 5.0 g/dL    Alkaline Phosphatase 75 33 - 136 U/L    Total Protein 6.7 6.4 - 8.2 g/dL    AST 19 9 - 39 U/L    Bilirubin, Total 0.9 0.0 - 1.2 mg/dL    ALT 10 7 - 45 U/L   Lipase   Result Value Ref Range    Lipase 11 9 - 82 U/L   Magnesium   Result Value Ref Range    Magnesium 1.79 1.60 - 2.40 mg/dL   Troponin I, High Sensitivity   Result Value Ref Range    Troponin I, High Sensitivity 15 (H) 0 - 13 ng/L   Morphology   Result Value Ref Range    RBC Morphology See Below     Hypochromia Marked     Ovalocytes Few     Teardrop Cells Few    Protime-INR   Result Value Ref Range    Protime 79.6 (HH) 9.8 - 12.4 seconds    INR 7.2 (HH) 0.9 - 1.1   Type and Screen   Result Value Ref Range    ABO TYPE A     Rh TYPE POS     ANTIBODY SCREEN NEG    Urinalysis with Reflex Culture and Microscopic   Result Value Ref Range    Color, Urine Yellow Straw, Yellow    Appearance, Urine Clear Clear    Specific Gravity, Urine 1.011 1.005 - 1.035    pH, Urine 5.0 5.0, 5.5, 6.0, 6.5, 7.0, 7.5, 8.0    Protein, Urine NEGATIVE NEGATIVE mg/dL    Glucose, Urine NEGATIVE NEGATIVE mg/dL    Blood, Urine NEGATIVE NEGATIVE mg/dL    Ketones, Urine NEGATIVE NEGATIVE mg/dL    Bilirubin, Urine NEGATIVE NEGATIVE mg/dL    Urobilinogen, Urine <2.0 <2.0 mg/dL    Nitrite, Urine NEGATIVE NEGATIVE    Leukocyte  Esterase, Urine NEGATIVE NEGATIVE   CBC and Auto Differential   Result Value Ref Range    WBC 6.7 4.4 - 11.3 x10*3/uL    nRBC 0.0 0.0 - 0.0 /100 WBCs    RBC 2.44 (L) 4.00 - 5.20 x10*6/uL    Hemoglobin 4.5 (LL) 12.0 - 16.0 g/dL    Hematocrit 17.1 (L) 36.0 - 46.0 %    MCV 70 (L) 80 - 100 fL    MCH 18.4 (L) 26.0 - 34.0 pg    MCHC 26.3 (L) 32.0 - 36.0 g/dL    RDW 19.6 (H) 11.5 - 14.5 %    Platelets 297 150 - 450 x10*3/uL    Neutrophils % 75.7 40.0 - 80.0 %    Immature Granulocytes %, Automated 0.6 0.0 - 0.9 %    Lymphocytes % 13.8 13.0 - 44.0 %    Monocytes % 8.8 2.0 - 10.0 %    Eosinophils % 0.7 0.0 - 6.0 %    Basophils % 0.4 0.0 - 2.0 %    Neutrophils Absolute 5.05 1.60 - 5.50 x10*3/uL    Immature Granulocytes Absolute, Automated 0.04 0.00 - 0.50 x10*3/uL    Lymphocytes Absolute 0.92 0.80 - 3.00 x10*3/uL    Monocytes Absolute 0.59 0.05 - 0.80 x10*3/uL    Eosinophils Absolute 0.05 0.00 - 0.40 x10*3/uL    Basophils Absolute 0.03 0.00 - 0.10 x10*3/uL   Morphology   Result Value Ref Range    RBC Morphology See Below     Hypochromia Marked    PST Top   Result Value Ref Range    Extra Tube Hold for add-ons.    Lavender Top   Result Value Ref Range    Extra Tube Hold for add-ons.    Prepare RBC: 2 Units   Result Value Ref Range    PRODUCT CODE E7777I65     Unit Number [id number]     Unit ABO O     Unit RH POS     XM INTEP COMP     Dispense Status TR     Blood Expiration Date 7/12/2025 11:59:00 PM EDT     PRODUCT BLOOD TYPE 5100     UNIT VOLUME 350     PRODUCT CODE C5719G94     Unit Number [id number]     Unit ABO O     Unit RH POS     XM INTEP COMP     Dispense Status TR     Blood Expiration Date 7/12/2025 11:59:00 PM EDT     PRODUCT BLOOD TYPE 5100     UNIT VOLUME 350    Occult Blood, Stool    Specimen: Stool   Result Value Ref Range    Occult Blood, Stool X1 Positive (A) Negative   VERIFY ABO/Rh Group Test   Result Value Ref Range    ABO TYPE A     Rh TYPE POS    Prepare Plasma: 1 Units   Result Value Ref Range     PRODUCT CODE W0041T12     Unit Number [id number]     Unit ABO AB     Unit RH POS     Dispense Status TR     Blood Expiration Date 7/6/2025  9:41:00 PM EDT     PRODUCT BLOOD TYPE 8400     UNIT VOLUME 202    CBC   Result Value Ref Range    WBC 6.8 4.4 - 11.3 x10*3/uL    nRBC 0.0 0.0 - 0.0 /100 WBCs    RBC 3.10 (L) 4.00 - 5.20 x10*6/uL    Hemoglobin 6.6 (L) 12.0 - 16.0 g/dL    Hematocrit 22.5 (L) 36.0 - 46.0 %    MCV 73 (L) 80 - 100 fL    MCH 21.3 (L) 26.0 - 34.0 pg    MCHC 29.3 (L) 32.0 - 36.0 g/dL    RDW 20.3 (H) 11.5 - 14.5 %    Platelets 272 150 - 450 x10*3/uL     Imaging  CT chest abdomen pelvis w IV contrast  Result Date: 7/1/2025  CHEST: 1. Scarring and/or atelectasis at the lung bases bilaterally. 2. No pleural effusion or pneumothorax identified.   ABDOMEN-PELVIS: 1. Hypodensities in the liver, most consistent with cysts. 2. Colonic diverticulosis, without evidence of acute diverticulitis. 3. No evidence of bowel obstruction, free intraperitoneal air or abnormal intra-abdominal fluid collection.   MACRO: None   Signed by: Zain Chandler 7/1/2025 3:46 PM Dictation workstation:   [id number]    CT head wo IV contrast  Result Date: 7/1/2025  No evidence of acute cortical infarct or intracranial hemorrhage.   MACRO: None     Signed by: Zain Chandler 7/1/2025 3:31 PM Dictation workstation:   [id number]      Cardiology, Vascular, and Other Imaging  No other imaging results found for the past 7 days       Assessment/Plan   Assessment & Plan  GI bleed    Acute blood loss anemia    Coagulopathy (Multi)    Bilateral leg edema    Vanessa Adame is a 89 y.o. female with a past medical history of DVT on Coumadin, anxiety, CHF, CKD, dementia, diabetes mellitus type 2, hyperlipidemia, hypertension, hypokalemia, overactive bladder, spinal stenosis and UTI who was sent to the hospital for management of generalized weakness and increased confusion.  She was found to have GI bleed/acute blood loss anemia with  coagulopathy due to Coumadin.    GI bleed/acute blood loss anemia   -Hemoglobin went down to 4.5 with a hematocrit of 17.1.  - This is likely due to supratherapeutic INR (INR 7.2 on Coumadin)  -CT scan of the abdomen and pelvis showed hypodensities in the liver, most consistent with cysts, no evidence of bowel obstruction, free intraperitoneal air or abnormal intra-abdominal fluid collection.  - Admit to stepdown unit  - Hold Coumadin  - Status post 2 units of PRBCs and 1 unit of FFP and 5 mg of vitamin K  - Hemoglobin improved to 6.6  - Will transfuse another unit of PRBCs after repeating H&H this morning  - Follow-up PT/INR  - Consult GI  - Give PPI  - Keep n.p.o.    Bilateral leg edema/CHF  - History of DVT in February 2025  -CT scan of the chest showed scaring and/or atelectasis at the lung bases bilaterally.  No pleural effusion or pneumothorax.  - Both legs are swollen with some leg pain bilaterally  - Will repeat ultrasound of the leg  - Hold Coumadin    Hypotension  - Improved  - Systolic blood pressure range from 93-1 25.  -It is currently 125    Hyponatremia   -Mild  - Will monitor    Current UTI treatment with nitrofurantoin  - Urine culture on 6/29 shows growth indicative of contamination with mixed bacterial terrie.  - Will repeat urinalysis and with reflex urine culture  - Hold antibiotics for now.      Liver cyst/multiple colonic diverticuli on CT scan  - Follow-up with PCP as outpatient for further management    Nocturnal hypoxia  - Currently on 2 L of oxygen via nasal cannula  - Patient wears oxygen at nighttime    Diabetes mellitus type 2  -No hemoglobin A1c in our system  - Sliding scale insulin  - Will defer diabetes management to primary care physician    DVT prophylaxis  - Coumadin is currently on hold  - SCDs can be used if venous Doppler is negative.    Medication reconciliation has not been completed yet.  Nursing staff have requested the list from Andrei at [facility].  Plan of care discussed  with POA.  Patient is full code.  Continue current management.         Mikey Martinez MD         [1] No family history on file.  [2] pantoprazole, 40 mg, intravenous, BID  [3]    [4] PRN medications: acetaminophen **OR** acetaminophen **OR** acetaminophen, melatonin, ondansetron **OR** ondansetron, polyethylene glycol

## 2025-07-02 NOTE — PROGRESS NOTES
Occupational Therapy                 Therapy Communication Note    Patient Name: Vanessa Adame  MRN: [id number]  Department: [address]  Room: [address] [place], [place] [address]  Today's Date: 7/2/2025     Discipline: Occupational Therapy    Missed Visit: OT Missed Visit: Yes     Missed Visit Reason: Missed Visit Reason: Patient placed on medical hold (Pt with high INR, high Protime, low H/H. Discussed with attending physician who agrees to hold OT evaluation at this time)    Missed Time: Attempt

## 2025-07-02 NOTE — CARE PLAN
The patient's goals for the shift include  rest    The clinical goals for the shift include H&H to remain WNL

## 2025-07-02 NOTE — CONSULTS
Reason For Consult  Possible GI bleed    History Of Present Illness  Vanessa Adame is a 89 y.o. female with a past medical history of DVT on Coumadin, HLD, HTN, CHF, CKD, T2DM, dementia, and UTI who was brought to the hospital from her assisted living facility for management of generalized weakness and increased confusion for the past 2 days. GI consulted to evaluate for possible GI bleed with hemoglobin down to 4.5 with a hematocrit of 17.1 with background of supratheraputic INR of 7.8. At bedside today, patient is accompanied by son, Anil. Patient states that her last bowel movement was 2-3 days before her admission. She states that her stools are typically formed. Denies nausea, vomiting, hematemesis, melena or any blood with bowel movements. Additionally, does not report any pain in abdomen. According to patient and her son, she has had no prior issues with GI bleeding. She stated that she had a cholecystectomy, but does not recall ever having an EGD in the past. Her last colonoscopy was in 2020, and revealed polps with no evidence of dysplasia, moderately severe diverticulosis in sigmoid colon and nonbleeding internal hemorrhoids.     In th ED, CT of the abdomen pelvis showed hypodensities in the liver, most consistent with cysts. Colonic diverticulosis, without evidence of acute diverticulitis. No evidence of bowel obstruction, free intraperitoneal air or abnormal intra-abdominal fluid collection.    Past Medical History  She has a past medical history of Anxiety, CHF (congestive heart failure), Chronic osteoarthritis, Chronic renal disease, stage III (Multi), Dementia with behavioral problem (Multi), Diabetes mellitus (Multi), Diverticulosis of intestine without perforation or abscess without bleeding, History of fall, Hyperlipemia, Hypertension, Hypokalemia, Lack of coordination, Overactive bladder, Senile dementia, uncomplicated (Multi), Spinal stenosis, Unsteady gait, and Weakness  generalized.    Surgical History  She has a past surgical history that includes Total knee arthroplasty (Left).     Social History  She reports that she has never smoked. She has never been exposed to tobacco smoke. She has never used smokeless tobacco. She reports that she does not drink alcohol and does not use drugs.    Family History  Family History[1]     Allergies  Amoxapine, Amoxicillin, Penicillin, and Sulfa (sulfonamide antibiotics)     Physical Exam  Physical Exam  HENT:      Head: Normocephalic and atraumatic.      Nose: Nose normal.   Eyes:      Extraocular Movements: Extraocular movements intact.      Pupils: Pupils are equal, round, and reactive to light.   Pulmonary:      Effort: Pulmonary effort is normal.      Breath sounds: Normal breath sounds.   Abdominal:      General: Abdomen is flat. Bowel sounds are normal. There is no distension.      Palpations: Abdomen is soft.      Tenderness: There is no abdominal tenderness. There is no guarding.   Musculoskeletal:      Cervical back: Normal range of motion.   Skin:     General: Skin is warm.   Neurological:      Mental Status: She is alert.           Last Recorded Vitals  Blood pressure 125/58, pulse 74, temperature 36.2 °C (97.2 °F), temperature source Temporal, resp. rate 21, height (!) 1.524 m (5'), weight 77.1 kg (170 lb), SpO2 98%.    Relevant Results  Scheduled medications  Scheduled Medications[2]  Continuous medications  Continuous Medications[3]  PRN medications  PRN Medications[4]    Results for orders placed or performed during the hospital encounter of 07/02/25 (from the past 24 hours)   Prepare RBC: 1 Units   Result Value Ref Range    PRODUCT CODE U7360M06     Unit Number [id number]     Unit ABO A     Unit RH POS     XM INTEP COMP     Dispense Status XM     Blood Expiration Date 7/8/2025 11:59:00 PM EDT     PRODUCT BLOOD TYPE 6200     UNIT VOLUME 350    CBC   Result Value Ref Range    WBC 6.3 4.4 - 11.3 x10*3/uL    nRBC 0.0 0.0 - 0.0  /100 WBCs    RBC 3.47 (L) 4.00 - 5.20 x10*6/uL    Hemoglobin 7.4 (L) 12.0 - 16.0 g/dL    Hematocrit 26.2 (L) 36.0 - 46.0 %    MCV 76 (L) 80 - 100 fL    MCH 21.3 (L) 26.0 - 34.0 pg    MCHC 28.2 (L) 32.0 - 36.0 g/dL    RDW 20.2 (H) 11.5 - 14.5 %    Platelets 307 150 - 450 x10*3/uL   Basic metabolic panel   Result Value Ref Range    Glucose 107 (H) 74 - 99 mg/dL    Sodium 138 136 - 145 mmol/L    Potassium 3.3 (L) 3.5 - 5.3 mmol/L    Chloride 103 98 - 107 mmol/L    Bicarbonate 23 21 - 32 mmol/L    Anion Gap 15 10 - 20 mmol/L    Urea Nitrogen 11 6 - 23 mg/dL    Creatinine 0.89 0.50 - 1.05 mg/dL    eGFR 62 >60 mL/min/1.73m*2    Calcium 9.1 8.6 - 10.3 mg/dL   Protime-INR   Result Value Ref Range    Protime 42.0 (H) 9.8 - 12.4 seconds    INR 3.8 (H) 0.9 - 1.1   Type and screen   Result Value Ref Range    ABO TYPE A     Rh TYPE POS     ANTIBODY SCREEN NEG    Iron and TIBC   Result Value Ref Range    Iron 110 35 - 150 ug/dL    UIBC 253 110 - 370 ug/dL    TIBC 363 240 - 445 ug/dL    % Saturation 30 25 - 45 %   Ferritin   Result Value Ref Range    Ferritin 25 8 - 150 ng/mL   VERIFY ABO/Rh Group Test   Result Value Ref Range    ABO TYPE A     Rh TYPE POS    POCT GLUCOSE   Result Value Ref Range    POCT Glucose 161 (H) 74 - 99 mg/dL   Urinalysis with Reflex Microscopic   Result Value Ref Range    Color, Urine Yellow Light-Yellow, Yellow, Dark-Yellow    Appearance, Urine Clear Clear    Specific Gravity, Urine 1.047 (N) 1.005 - 1.035    pH, Urine 6.0 5.0, 5.5, 6.0, 6.5, 7.0, 7.5, 8.0    Protein, Urine NEGATIVE NEGATIVE, 10 (TRACE), 20 (TRACE) mg/dL    Glucose, Urine Normal Normal mg/dL    Blood, Urine NEGATIVE NEGATIVE mg/dL    Ketones, Urine NEGATIVE NEGATIVE mg/dL    Bilirubin, Urine NEGATIVE NEGATIVE mg/dL    Urobilinogen, Urine Normal Normal mg/dL    Nitrite, Urine NEGATIVE NEGATIVE    Leukocyte Esterase, Urine NEGATIVE NEGATIVE   B-type natriuretic peptide   Result Value Ref Range    BNP 1,098 (H) 0 - 99 pg/mL   POCT GLUCOSE    Result Value Ref Range    POCT Glucose 119 (H) 74 - 99 mg/dL     Vascular US lower extremity venous duplex bilateral  Result Date: 7/2/2025          [place] [address]  Tel [phone] and Fax [phone]  Vascular Lab Report [facility] US LOWER EXTREMITY VENOUS DUPLEX BILATERAL  Patient Name:      BENJI JÚNIOR Pena Physician: [id number] Darian Albright MD                    [place] Study Date:        7/2/2025            Ordering           [id number] [facility]                                        Physician:         DEANNE MRN/PID:           [id number]            Technologist:      Melody Elise [facility] Accession#:        [id number]        Technologist 2: Date of Birth/Age: 1935 / 89      Encounter#:        [id number]                    years Gender:            F Admission Status:  Inpatient           Location           [facility]                                        Performed:  Diagnosis/ICD: Localized (leg) edema-R60.0 CPT Codes:     50930 Peripheral venous duplex scan for DVT complete  CONCLUSIONS: Right Lower Venous: No evidence of acute deep vein thrombus visualized in the right lower extremity. Left Lower Venous: Patient refused left lower extremity.  Additional Findings: Technically difficult and limited exam due to patient refusal and inability to tolerate compressions.  Imaging & Doppler Findings:  Right                 Compressible Thrombus        Flow Distal External Iliac                       Spontaneous/Phasic CFV                       Yes        None   Spontaneous/Phasic PFV                       Yes        None FV Proximal               Yes        None   Spontaneous/Phasic FV Mid                    Yes        None FV Distal                 Yes        None Popliteal                 Yes        None   Spontaneous/Phasic Peroneal                  Yes        None PTV                       Yes        None  [id number] Darian Albright MD Electronically signed  by [id number] Darian Albright MD on 7/2/2025 at 9:24:47 AM  ** Final **     ECG 12 lead  Result Date: 7/2/2025  Sinus rhythm with 1st degree AV block Left axis deviation Left bundle branch block Abnormal ECG When compared with ECG of 10-FEB-2025 20:34, No significant change was found    CT chest abdomen pelvis w IV contrast  Result Date: 7/1/2025  Interpreted By:  Zain Chandler, STUDY: CT CHEST ABDOMEN PELVIS W IV CONTRAST; 7/1/2025 3:00 pm   INDICATION: Signs/Symptoms:UTI, confusion..   COMPARISON: CT of the abdomen and pelvis dated 02/10/2025   ACCESSION NUMBER(S): [id number]   ORDERING CLINICIAN: AN HUERTA   TECHNIQUE: Contiguous axial CT images were obtained at 3mm slice thickness through the chest, abdomen and pelvis following intravenous contrast administration. Coronal and sagittal reconstructions at 3 mm slice thickness were then performed. 100 ML of Omnipaque 350 was administered.   FINDINGS: CHEST:   CHEST WALL AND LOWER NECK: Evaluation of the visualized neck base demonstrates no gross mass or lymphadenopathy.   MEDIASTINUM AND MARIE: There is no gross axillary, marie or mediastinal lymphadenopathy identified.   HEART: The heart is at the upper limits of normal for size. No pericardial effusion is identified. Scattered atherosclerotic calcifications are seen in the coronary arteries. Minimal aortic valve calcifications are present.   VESSELS: Scattered atherosclerotic calcifications are seen in the aortic arch and descending thoracic aorta, including at the origins of the arch vessels. The thoracic aorta is within normal limits for course and caliber, without evidence of aneurysm.   LUNG/PLEURA/LARGE AIRWAYS: Mild scarring and/or atelectasis is seen at the lung bases bilaterally. There is no focal infiltrate, pleural effusion or pneumothorax identified. No discrete pulmonary nodules or masses are seen.   ABDOMEN:   LIVER: Rounded hypodensities are seen in the liver, measuring at up to 5.3 cm in diameter,  most consistent with cysts. No definite enhancing hepatic mass is seen.   BILE DUCTS: No definite intra or extrahepatic biliary dilatation is identified.   GALLBLADDER: The gallbladder is surgically absent ,with surgical clips seen in the gallbladder fossa.   PANCREAS: The pancreas is within normal limits for appearance, without evidence of focal masses.   SPLEEN: The spleen is within normal limits for size. No focal splenic mass is seen.   ADRENAL GLANDS: No definite adrenal nodules or masses are seen bilaterally.   KIDNEYS AND URETERS: There is no hydronephrosis, hydroureter or renal/ ureteral calculus identified bilaterally. No definite focal renal mass is seen.   PELVIS:   BLADDER: The urinary bladder is grossly unremarkable for CT appearance.   REPRODUCTIVE ORGANS: The uterus and ovaries are grossly unremarkable for CT appearance.   BOWEL: Multiple diverticuli are seen throughout the sigmoid colon. The colon and small bowel are within normal limits for course, caliber and appearance, without evidence of wall thickening or obstruction. The appendix is decompressed. No CT evidence of acute diverticulitis or appendicitis is seen.   VESSELS: Scattered atherosclerotic calcifications are seen throughout the infrarenal abdominal aorta and iliac arteries. The abdominal aorta is within normal limits for course, caliber and appearance, without evidence of aneurysm.   PERITONEUM/RETROPERITONEUM/LYMPH NODES: There is no free intraperitoneal air or free fluid identified. No gross mesenteric or retroperitoneal lymphadenopathy is identified.   BONE AND SOFT TISSUE: There is no evidence of acute fracture identified. No evidence of abdominal wall mass or hernia is identified.       CHEST: 1. Scarring and/or atelectasis at the lung bases bilaterally. 2. No pleural effusion or pneumothorax identified.   ABDOMEN-PELVIS: 1. Hypodensities in the liver, most consistent with cysts. 2. Colonic diverticulosis, without evidence of acute  diverticulitis. 3. No evidence of bowel obstruction, free intraperitoneal air or abnormal intra-abdominal fluid collection.   MACRO: None   Signed by: Zain Chandler 7/1/2025 3:46 PM Dictation workstation:   [id number]    CT head wo IV contrast  Result Date: 7/1/2025  Interpreted By:  Zain Chandler, STUDY: CT HEAD WO IV CONTRAST; 7/1/2025 3:00 pm   INDICATION: Signs/Symptoms:altered mental status.   COMPARISON: CT head dated 05/29/2025   ACCESSION NUMBER(S): [id number]   ORDERING CLINICIAN: AN HUERTA   TECHNIQUE: Contiguous axial CT images were obtained through the head at 2 mm slice thickness without contrast administration.   FINDINGS: INTRACRANIAL: The ventricles, sulci and basal cisterns are within normal limits for size and configuration. The grey-white differentiation is intact. There is no mass effect or midline shift. There is no extraaxial fluid collection. There is no intracranial hemorrhage.  The calvarium is unremarkable.   EXTRACRANIAL: Visualized paranasal sinuses and mastoids are clear.       No evidence of acute cortical infarct or intracranial hemorrhage.   MACRO: None     Signed by: Zain Chandler 7/1/2025 3:31 PM Dictation workstation:   [id number]    Vascular US lower extremity venous duplex right  Result Date: 6/2/2025  Interpreted By:  Shun Lopez, STUDY: VAS US LOWER EXTREMITY VENOUS DUPLEX RIGHT;  6/2/2025 2:33 pm   INDICATION: Signs/Symptoms:RIGHT LEG PAIN AND EDEMA.   ,R60.0 Localized edema,M79.604 Pain in right leg   COMPARISON: None.   ACCESSION NUMBER(S): [id number]   ORDERING CLINICIAN: KISHORE GRACE   TECHNIQUE: Vascular ultrasound of the  right lower extremity was performed. Real-time compression views as well as Gray scale, color Doppler and spectral Doppler waveform analysis was performed.   FINDINGS: Evaluation of the visualized portions of the common femoral vein, proximal, mid, and distal femoral vein, and popliteal vein were performed.  Evaluation of the visualized  portions of the calf veins was also performed.   The evaluated veins demonstrate normal compressibility. There is intact venous flow demonstrating normal respiratory variability and normal augmentation of flow with calf compression. Therefore, there is no ultrasonographic evidence for deep vein thrombosis within the evaluated veins.       No sonographic evidence for deep vein thrombosis within the evaluated veins.   MACRO: None.   Signed by: Shun Lopez 6/2/2025 2:37 PM Dictation workstation:   [id number]        Assessment/Plan     Vanessa Adame is an 89yo female with a PMH of DVT on Coumadin, HLD, HTN, CHF, CKD, T2DM, dementia, and UTI admitted for generalized weakness and increased confusion. GI was consulted for possible GI bleed.     # Acute blood loss anemia secondary to GI bleed but not overt GI bleeding.  FOBT positive as per primary team  - Denies any bloody emesis or stools  - Hemoglobin went down to 4.5 with a hematocrit of 17.1 with background of supratherapeutic INR 7.2 on Coumadin on admission  - Coumadin held on admission  - Hemoglobin improved to 7.4 and INR improved to 3.8 today  - CT of the abdomen pelvis showed hypodensities in the liver, most consistent with cysts  - Colonscopy from 2020 revealed polps with no evidence of dysplasia, moderately severe diverticulosis in sigmoid colon and nonbleeding internal hemorrhoids.   - No sign of overt GI bleed       PLAN  - Continue to monitor CBC  - Continue to hold Coumadin   - Continue PPI 2 times daily   - EGD not recommended at this time  - In the case of EGD, patient should start NPO at midnight  - Continue management as per primary team  - GI will continue to follow patient    Barry Tolbert MD  Internal Medicine, PGY-1  I saw and evaluated the patient. I personally obtained the key and critical portions of the history and physical exam or was physically present for key and critical portions performed by the resident. I reviewed the  resident's documentation and discussed the patient with the resident. I agree with the resident's medical decision making as documented in the note.          [1] No family history on file.  [2] [Held by provider] enalapril, 20 mg, oral, Daily  [START ON 7/3/2025] furosemide, 40 mg, oral, Daily  insulin lispro, 0-5 Units, subcutaneous, q4h  pantoprazole, 40 mg, intravenous, BID  polyethylene glycol, 17 g, oral, Daily  [3]    [4] PRN medications: acetaminophen **OR** acetaminophen **OR** acetaminophen, calcium carbonate, dextrose, dextrose, fluticasone, glucagon, glucagon, melatonin, ondansetron **OR** ondansetron, polyethylene glycol

## 2025-07-02 NOTE — PROGRESS NOTES
Physical Therapy                 Therapy Communication Note    Patient Name: Vanessa Adame  MRN: [id number]  Department: [address]  Room: 248/[facility]  Today's Date: 7/2/2025     Discipline: Physical Therapy    Missed Visit: PT Missed Visit: Yes     Missed Visit Reason: Missed Visit Reason: Patient placed on medical hold. PT consult received and chart reviewed. Pt is an 88 yo F presenting to [facility] on 7/2/25 with generalized weakness and increased confusion; admitted with GI bleed/acute blood loss anemia with coagulopathy 2/2 Coumadin.     Pt with recent history of DVT (February 2025). Repeat duplex ordered and completed, but pt refused LLE. AM labs also revealed H&H of 7.4/26.2 (improved from 6.6/22.5), Protime of 42.0, and INR of 3.8.     Communicated with hospitalist, who recommends PT/OT hold evaluations this date. Will follow-up with pt tomorrow, as appropriate/pending labs.     Missed Time: Attempt    Comment: [phone]

## 2025-07-02 NOTE — CONSULTS
Nutrition Initial Assessment:   Nutrition Assessment    Reason for Assessment: Admission nursing screening    Patient is a 89 y.o. female presenting with GI bleed. Consulted by [facility] for wt loss.     Nutrition History:  Energy Intake:  (NPO)  Food and Nutrient History: Consulted by [facility] for wt loss. Wt trending down but nothing clinically significant (-5lbs) in 4-6 months. Pt being treated for UTI, noted some confusion and was complaining of nausea.  Pt with GI bleed, currently NPO, being followed by GI. Advance diet per MD and as tolerated. Rec; ensure clear TID once diet advances to clear liquids.    Anthropometrics:  Height: (!) 152.4 cm (5')   Weight: 77.1 kg (170 lb)     Weight History:   Daily Weight  07/02/25 : 77.1 kg (170 lb)  07/01/25 : 76.6 kg (168 lb 14 oz)  06/29/25 : 79.4 kg (175 lb 0.7 oz)  05/09/25 : 79.4 kg (175 lb)  02/15/25 : 79 kg (174 lb 2.6 oz)  02/11/25 : 77.6 kg (171 lb 1.2 oz)  01/18/25 : 83.9 kg (184 lb 15.5 oz)  11/19/24 : 74.1 kg (163 lb 5.8 oz)  10/03/24 : 74.4 kg (164 lb)  09/06/24 : 74.4 kg (164 lb 0.4 oz)     Weight Change %:  Significant Weight Loss: No    Nutrition Focused Physical Exam Findings:    Subcutaneous Fat Loss:   Defer Subcutaneous Fat Loss Assessment: Defer all  Defer All Reason: unable to assess  Muscle Wasting:  Defer Muscle Wasting Assessment: Defer all  Defer All Reason: unable to assess  Edema:  Edema: +1 trace  Edema Location: BLE  Physical Findings:  Skin: Negative    Nutrition Significant Labs:  CBC Trend:   Results from last 7 days   Lab Units 07/02/25  0615 07/02/25  0032 07/01/25  1528 07/01/25  1328   WBC AUTO x10*3/uL 6.3 6.8 6.7 6.5   RBC AUTO x10*6/uL 3.47* 3.10* 2.44* 2.77*   HEMOGLOBIN g/dL 7.4* 6.6* 4.5* 5.0*   HEMATOCRIT % 26.2* 22.5* 17.1* 19.4*   MCV fL 76* 73* 70* 70*   PLATELETS AUTO x10*3/uL 307 272 297 337    , BMP Trend:   Results from last 7 days   Lab Units 07/02/25  0615 07/01/25  1328   GLUCOSE mg/dL 107* 141*   CALCIUM mg/dL 9.1 10.0   SODIUM  "mmol/L 138 134*   POTASSIUM mmol/L 3.3* 3.8   CO2 mmol/L 23 25   CHLORIDE mmol/L 103 101   BUN mg/dL 11 14   CREATININE mg/dL 0.89 0.95    , A1C:No results found for: \"HGBA1C\"    Nutrition Specific Medications:  Scheduled Medications[1]      Dietary Orders (From admission, onward)       Start     Ordered    07/03/25 0001  NPO Diet Except: Other (specify); Additional Details: Clear liquids until 0500. May have clears up to 2 hours prior to procedure if exact time is known.; Effective midnight  Diet effective midnight        Question Answer Comment   Except: Other (specify)    Additional Details Clear liquids until 0500. May have clears up to 2 hours prior to procedure if exact time is known.        07/02/25 1149    07/02/25 1117  NPO Diet; Effective now  Diet effective now         07/02/25 1117    07/02/25 0614  May Participate in Room Service With Assistance  ( ROOM SERVICE MAY PARTICIPATE WITH ASSISTANCE)  Once        Question:  .  Answer:  Yes    07/02/25 0613                  Estimated Needs:   Total Energy Estimated Needs in 24 hours (kCal): 1400 kCal  Method for Estimating Needs: 25kcals/kg IBW  Total Protein Estimated Needs in 24 Hours (g): 55 g  Method for Estimating 24 Hour Protein Needs: 1g/kg IBW     Method for Estimating 24 Hour Fluid Needs: 1 ml/kcal or per MD        Nutrition Diagnosis   Malnutrition Diagnosis  Patient has Malnutrition Diagnosis:  (insufficent data)    Nutrition Diagnosis  Patient has Nutrition Diagnosis: Yes  Diagnosis Status (1): New  Nutrition Diagnosis 1: Predicted inadequate energy intake  Related to (1): GI bleed  As Evidenced by (1): NPO status       Nutrition Interventions/Recommendations   Nutrition prescription for oral nutrition    Nutrition Recommendations:  Individualized Nutrition Prescription Provided for : Continue to follow diet per GI, advance diet as tolerated. once diet advances to clear rec; ensure clear TID.    Nutrition Interventions/Goals:   Medical Food " Supplement: Commercial beverage medical food supplement therapy  Goal: ensure clear TID      Education Documentation  No documentation found.      Nutrition Monitoring and Evaluation   Estimated Energy Intake: Energy intake 50 -75% of estimated energy needs  Intake / Amount of food: Consumes at least 50% or more of meals/snacks/supplements  Additional Plans: advance diet per MD and as tolerated. Ensure clear TID once diet advances.    Body Weight: Body weight - Maintain stable weight    Electrolyte and Renal Panel: Electrolytes within normal limits  Glucose/Endocrine Profile: Glucose within normal limits ( mg/dL)    Edema Finding: +1 Pitting edema    Goal Status: New goal(s) identified    Time Spent (min): 60 minutes       [1] [Held by provider] enalapril, 20 mg, oral, Daily  furosemide, 20 mg, intravenous, Once  [START ON 7/3/2025] furosemide, 40 mg, oral, Daily  insulin lispro, 0-5 Units, subcutaneous, q4h  pantoprazole, 40 mg, intravenous, BID  polyethylene glycol, 17 g, oral, Daily

## 2025-07-02 NOTE — PROGRESS NOTES
Vanessa Adame is a 89 y.o. female on day 0 of admission presenting with GI bleed.    Subjective     Son at bedside. Patient resting comfortably in bed. She is unsure of any bloody stools prior to admission. Does not worsening SOB over the past few days when ambulating.     Objective     Physical Exam  Constitutional:       General: She is not in acute distress.     Appearance: She is not toxic-appearing.   HENT:      Head: Normocephalic and atraumatic.      Nose: Nose normal.   Eyes:      Conjunctiva/sclera: Conjunctivae normal.   Cardiovascular:      Rate and Rhythm: Normal rate and regular rhythm.      Heart sounds: No murmur heard.  Pulmonary:      Effort: Respiratory distress present.      Breath sounds: Rales present.   Abdominal:      General: Bowel sounds are normal. There is no distension.      Palpations: Abdomen is soft.   Musculoskeletal:         General: Swelling and tenderness present.      Right lower leg: Edema present.      Left lower leg: Edema present.   Skin:     General: Skin is warm.   Neurological:      General: No focal deficit present.      Mental Status: She is alert.   Psychiatric:         Mood and Affect: Mood normal.         Last Recorded Vitals  Blood pressure 125/58, pulse 74, temperature 36.2 °C (97.2 °F), temperature source Temporal, resp. rate 21, height (!) 1.524 m (5'), weight 77.1 kg (170 lb), SpO2 98%.  Intake/Output last 3 Shifts:  No intake/output data recorded.    Relevant Results  Scheduled medications  Scheduled Medications[1]  Continuous medications  Continuous Medications[2]  PRN medications  PRN Medications[3]    Results for orders placed or performed during the hospital encounter of 07/02/25 (from the past 24 hours)   Prepare RBC: 1 Units   Result Value Ref Range    PRODUCT CODE O5767Y68     Unit Number [id number]     Unit ABO A     Unit RH POS     XM INTEP COMP     Dispense Status XM     Blood Expiration Date 7/8/2025 11:59:00 PM EDT     PRODUCT BLOOD TYPE  6200     UNIT VOLUME 350    CBC   Result Value Ref Range    WBC 6.3 4.4 - 11.3 x10*3/uL    nRBC 0.0 0.0 - 0.0 /100 WBCs    RBC 3.47 (L) 4.00 - 5.20 x10*6/uL    Hemoglobin 7.4 (L) 12.0 - 16.0 g/dL    Hematocrit 26.2 (L) 36.0 - 46.0 %    MCV 76 (L) 80 - 100 fL    MCH 21.3 (L) 26.0 - 34.0 pg    MCHC 28.2 (L) 32.0 - 36.0 g/dL    RDW 20.2 (H) 11.5 - 14.5 %    Platelets 307 150 - 450 x10*3/uL   Basic metabolic panel   Result Value Ref Range    Glucose 107 (H) 74 - 99 mg/dL    Sodium 138 136 - 145 mmol/L    Potassium 3.3 (L) 3.5 - 5.3 mmol/L    Chloride 103 98 - 107 mmol/L    Bicarbonate 23 21 - 32 mmol/L    Anion Gap 15 10 - 20 mmol/L    Urea Nitrogen 11 6 - 23 mg/dL    Creatinine 0.89 0.50 - 1.05 mg/dL    eGFR 62 >60 mL/min/1.73m*2    Calcium 9.1 8.6 - 10.3 mg/dL   Protime-INR   Result Value Ref Range    Protime 42.0 (H) 9.8 - 12.4 seconds    INR 3.8 (H) 0.9 - 1.1   Type and screen   Result Value Ref Range    ABO TYPE A     Rh TYPE POS     ANTIBODY SCREEN NEG    VERIFY ABO/Rh Group Test   Result Value Ref Range    ABO TYPE A     Rh TYPE POS    POCT GLUCOSE   Result Value Ref Range    POCT Glucose 161 (H) 74 - 99 mg/dL   Vascular US lower extremity venous duplex bilateral   Result Value Ref Range    BSA 1.81 m2      Vascular US lower extremity venous duplex bilateral  Result Date: 7/2/2025  Preliminary Cardiology Report         [place] [address]  Tel [phone] and Fax [phone]          Preliminary Vascular Lab Report  [facility] US LOWER EXTREMITY VENOUS DUPLEX BILATERAL  Patient Name:      BENJI Pena Physician: [id number] Darian Albright MD                    [place] Study Date:        7/2/2025            [facility] ([place], [place])           [address] [facility]                                        Physician:         DEANNE MRN/PID:           [id number]            Technologist:      Melody Elise RVS Accession#:        [id number]        Technologist 2: Date of Birth/Age: 1935            Encounter#:        [id number] Gender:            F Admission Status:  Inpatient           Location           [facility]                                        Performed:  Diagnosis/ICD: Localized (leg) edema-R60.0 Procedure/CPT: 71915 Peripheral venous duplex scan for DVT complete  PRELIMINARY CONCLUSIONS: Right Lower Venous: No evidence of acute deep vein thrombus visualized in the right lower extremity. Left Lower Venous: Patient refused left lower extremity.  Additional Findings: Technically difficult and limited exam due to patient refusal and inability to tolerate compressions.  Imaging & Doppler Findings:  Right                 Compressible Thrombus        Flow Distal External Iliac                       Spontaneous/Phasic CFV                       Yes        None   Spontaneous/Phasic PFV                       Yes        None FV Proximal               Yes        None   Spontaneous/Phasic FV Mid                    Yes        None FV Distal                 Yes        None Popliteal                 Yes        None   Spontaneous/Phasic Peroneal                  Yes        None PTV                       Yes        None  VASCULAR PRELIMINARY REPORT completed by Melody HAGER on 7/2/2025 at 8:16:22 AM  ** Final **     ECG 12 lead  Result Date: 7/2/2025  Sinus rhythm with 1st degree AV block Left axis deviation Left bundle branch block Abnormal ECG When compared with ECG of 10-FEB-2025 20:34, No significant change was found    CT chest abdomen pelvis w IV contrast  Result Date: 7/1/2025  Interpreted By:  Zain Chandler, STUDY: CT CHEST ABDOMEN PELVIS W IV CONTRAST; 7/1/2025 3:00 pm   INDICATION: Signs/Symptoms:UTI, confusion..   COMPARISON: CT of the abdomen and pelvis dated 02/10/2025   ACCESSION NUMBER(S): [id number]   ORDERING CLINICIAN: AN HUERTA   TECHNIQUE: Contiguous axial CT images were obtained at 3mm slice thickness through the chest, abdomen and pelvis following intravenous  contrast administration. Coronal and sagittal reconstructions at 3 mm slice thickness were then performed. 100 ML of Omnipaque 350 was administered.   FINDINGS: CHEST:   CHEST WALL AND LOWER NECK: Evaluation of the visualized neck base demonstrates no gross mass or lymphadenopathy.   MEDIASTINUM AND KAY: There is no gross axillary, kay or mediastinal lymphadenopathy identified.   HEART: The heart is at the upper limits of normal for size. No pericardial effusion is identified. Scattered atherosclerotic calcifications are seen in the coronary arteries. Minimal aortic valve calcifications are present.   VESSELS: Scattered atherosclerotic calcifications are seen in the aortic arch and descending thoracic aorta, including at the origins of the arch vessels. The thoracic aorta is within normal limits for course and caliber, without evidence of aneurysm.   LUNG/PLEURA/LARGE AIRWAYS: Mild scarring and/or atelectasis is seen at the lung bases bilaterally. There is no focal infiltrate, pleural effusion or pneumothorax identified. No discrete pulmonary nodules or masses are seen.   ABDOMEN:   LIVER: Rounded hypodensities are seen in the liver, measuring at up to 5.3 cm in diameter, most consistent with cysts. No definite enhancing hepatic mass is seen.   BILE DUCTS: No definite intra or extrahepatic biliary dilatation is identified.   GALLBLADDER: The gallbladder is surgically absent ,with surgical clips seen in the gallbladder fossa.   PANCREAS: The pancreas is within normal limits for appearance, without evidence of focal masses.   SPLEEN: The spleen is within normal limits for size. No focal splenic mass is seen.   ADRENAL GLANDS: No definite adrenal nodules or masses are seen bilaterally.   KIDNEYS AND URETERS: There is no hydronephrosis, hydroureter or renal/ ureteral calculus identified bilaterally. No definite focal renal mass is seen.   PELVIS:   BLADDER: The urinary bladder is grossly unremarkable for CT  appearance.   REPRODUCTIVE ORGANS: The uterus and ovaries are grossly unremarkable for CT appearance.   BOWEL: Multiple diverticuli are seen throughout the sigmoid colon. The colon and small bowel are within normal limits for course, caliber and appearance, without evidence of wall thickening or obstruction. The appendix is decompressed. No CT evidence of acute diverticulitis or appendicitis is seen.   VESSELS: Scattered atherosclerotic calcifications are seen throughout the infrarenal abdominal aorta and iliac arteries. The abdominal aorta is within normal limits for course, caliber and appearance, without evidence of aneurysm.   PERITONEUM/RETROPERITONEUM/LYMPH NODES: There is no free intraperitoneal air or free fluid identified. No gross mesenteric or retroperitoneal lymphadenopathy is identified.   BONE AND SOFT TISSUE: There is no evidence of acute fracture identified. No evidence of abdominal wall mass or hernia is identified.       CHEST: 1. Scarring and/or atelectasis at the lung bases bilaterally. 2. No pleural effusion or pneumothorax identified.   ABDOMEN-PELVIS: 1. Hypodensities in the liver, most consistent with cysts. 2. Colonic diverticulosis, without evidence of acute diverticulitis. 3. No evidence of bowel obstruction, free intraperitoneal air or abnormal intra-abdominal fluid collection.   MACRO: None   Signed by: Zain Chandler 7/1/2025 3:46 PM Dictation workstation:   [id number]    CT head wo IV contrast  Result Date: 7/1/2025  Interpreted By:  Zain Chandler, STUDY: CT HEAD WO IV CONTRAST; 7/1/2025 3:00 pm   INDICATION: Signs/Symptoms:altered mental status.   COMPARISON: CT head dated 05/29/2025   ACCESSION NUMBER(S): [id number]   ORDERING CLINICIAN: AN HUERTA   TECHNIQUE: Contiguous axial CT images were obtained through the head at 2 mm slice thickness without contrast administration.   FINDINGS: INTRACRANIAL: The ventricles, sulci and basal cisterns are within normal limits for size  and configuration. The grey-white differentiation is intact. There is no mass effect or midline shift. There is no extraaxial fluid collection. There is no intracranial hemorrhage.  The calvarium is unremarkable.   EXTRACRANIAL: Visualized paranasal sinuses and mastoids are clear.       No evidence of acute cortical infarct or intracranial hemorrhage.   MACRO: None     Signed by: Zain Chandler 7/1/2025 3:31 PM Dictation workstation:   [id number]       Assessment & Plan  GI bleed    Acute blood loss anemia    Coagulopathy (Multi)    Bilateral leg edema    Vanessa Adame is a 89 y.o. female with a past medical history of DVT on Coumadin, CHF, CKD, dementia, diabetes mellitus type 2,  overactive bladder, spinal stenosis and UTI who initially presented with weakness  admitted for an acutely worsening anemia    #acutely worsening Anemia with uncertain etiology    #supratherapeutic INR   CT abdomen and pelvis without evidence of acute diverticulitis and patient denies any overt blood in stools so GI bleed less likely. However, was previously admitted for diverticulitis   -INR on admission (7.2)  -positive for occult blood in stool (was present back in 2024)  -colonoscopy in 2020 overall unremarkable   -initially presented with Hg < 5  -s/p RBC transfusion x 3 units; s/p vitamin K 5 mg and FFP x 1  -hg in am 6.6 s/p 1 unit RBC with repeat 7.4  -recheck CBC and INR at 1400.   --If INR >3.5 consider additional dose of vitamin K 2.5 mg PO   --transfuse if Hg  <7  -protonix 40 mg IV BID   -NPO pending GI consult   -holding home warfarin.     # LE Edema/HFrEF  -US of lower extremity negative for DVT in R leg  -patient refused LLE due to pain  -BNP elevated; ECHO ordered  -on 2 L NC overnight at home, but currently on 2L  during the day  -s/p lasix IV 20 mg x 1. Will order second dose given signs of fluid overload on exam  -resume home lasix 40 mg PO tomorrow     Hx of DVT  -diagnosed during admission in 2/25 for  diverticulitis  -uncertain if provoked or unprovoked     MARIA ALEJANDRA  -on 2 L NC overnight   -no new O2 requirements     cystitis  -holding Macrobid   -urine culture negative   -repeat UA ordered     Hyperglycemia   -no recent hemoglobin A1c  -repeat A1c ordered  -sliding scale     Hypotension  -on admission but improved  -resume home enalapril 20 mg  -resume home lasix 40 mg daily   -resume metoprolol 25 mg BID     Hypokalemia  -secondary to GI losses  -replete PRN    Diet: NPO  DVT prophylaxis: SCD's ordered     Dispo: patient requires a higher level of care due to risk of acute decompensation due to anemia     Tim Saenz DO  PGY-3  Family Medicine            [1] furosemide, 20 mg, intravenous, Once  insulin lispro, 0-5 Units, subcutaneous, q4h  pantoprazole, 40 mg, intravenous, BID  potassium chloride CR, 40 mEq, oral, Once  [2]    [3] PRN medications: acetaminophen **OR** acetaminophen **OR** acetaminophen, dextrose, dextrose, glucagon, glucagon, melatonin, ondansetron **OR** ondansetron, polyethylene glycol

## 2025-07-02 NOTE — PROGRESS NOTES
07/02/25 1530   Discharge Planning   Living Arrangements Other (Comment)  ([facility] at the [facility] in [place])   Support Systems Children   Assistance Needed Per facility baseline is A&0x3 confused at times, ambulates with rollator/ walker, doesn't drive, room air, HX DVT on coumadin   Type of Residence Assisted living   Do you have animals or pets at home? No   Care Facility Name [facility]   Who is requesting discharge planning? Provider   Expected Discharge Disposition [facility]  (TBD: PT/ OT pending. Spoke with son POA who confirms preference is for patient to return to [facility] if facility willing to accept. If returning to [facility] family will transport home.)   Does the patient need discharge transport arranged? No   Financial Resource Strain   How hard is it for you to pay for the very basics like food, housing, medical care, and heating? Not hard   Housing Stability   In the last 12 months, was there a time when you were not able to pay the mortgage or rent on time? N   In the past 12 months, how many times have you moved where you were living? 0   At any time in the past 12 months, were you homeless or living in a shelter (including now)? N   Transportation Needs   In the past 12 months, has lack of transportation kept you from medical appointments or from getting medications? no   In the past 12 months, has lack of transportation kept you from meetings, work, or from getting things needed for daily living? No   Patient Choice   Provider Choice list and CMS website (https://medicare.gov/care-compare#search) for post-acute Quality and Resource Measure Data were provided and reviewed with: Family;Patient   Patient / Family choosing to utilize agency / facility established prior to hospitalization Yes   Stroke Family Assessment   Stroke Family Assessment Needed No   Intensity of Service   Intensity of Service 0-30 min

## 2025-07-03 ENCOUNTER — APPOINTMENT (OUTPATIENT)
Dept: CARDIOLOGY | Facility: HOSPITAL | Age: 89
End: 2025-07-03
Payer: MEDICARE

## 2025-07-03 ENCOUNTER — ANESTHESIA (OUTPATIENT)
Dept: OPERATING ROOM | Facility: HOSPITAL | Age: 89
End: 2025-07-03
Payer: MEDICARE

## 2025-07-03 ENCOUNTER — APPOINTMENT (OUTPATIENT)
Dept: OPERATING ROOM | Facility: HOSPITAL | Age: 89
End: 2025-07-03
Payer: MEDICARE

## 2025-07-03 ENCOUNTER — ANESTHESIA EVENT (OUTPATIENT)
Dept: OPERATING ROOM | Facility: HOSPITAL | Age: 89
End: 2025-07-03
Payer: MEDICARE

## 2025-07-03 LAB
ALBUMIN SERPL BCP-MCNC: 3.2 G/DL (ref 3.4–5)
ALP SERPL-CCNC: 64 U/L (ref 33–136)
ALT SERPL W P-5'-P-CCNC: 8 U/L (ref 7–45)
ANION GAP SERPL CALC-SCNC: 10 MMOL/L (ref 10–20)
AORTIC VALVE MEAN GRADIENT: 4 MMHG
AORTIC VALVE PEAK VELOCITY: 1.23 M/S
AST SERPL W P-5'-P-CCNC: 13 U/L (ref 9–39)
AV PEAK GRADIENT: 6 MMHG
AVA (PEAK VEL): 2.26 CM2
AVA (VTI): 2.44 CM2
BILIRUB DIRECT SERPL-MCNC: 0.5 MG/DL (ref 0–0.3)
BILIRUB SERPL-MCNC: 1.6 MG/DL (ref 0–1.2)
BLOOD EXPIRATION DATE: NORMAL
BLOOD EXPIRATION DATE: NORMAL
BUN SERPL-MCNC: 9 MG/DL (ref 6–23)
CALCIUM SERPL-MCNC: 9 MG/DL (ref 8.6–10.3)
CHLORIDE SERPL-SCNC: 102 MMOL/L (ref 98–107)
CO2 SERPL-SCNC: 29 MMOL/L (ref 21–32)
CREAT SERPL-MCNC: 0.85 MG/DL (ref 0.5–1.05)
DISPENSE STATUS: NORMAL
DISPENSE STATUS: NORMAL
EGFRCR SERPLBLD CKD-EPI 2021: 66 ML/MIN/1.73M*2
EJECTION FRACTION APICAL 4 CHAMBER: 38.2
EJECTION FRACTION: 38 %
ERYTHROCYTE [DISTWIDTH] IN BLOOD BY AUTOMATED COUNT: 20.4 % (ref 11.5–14.5)
ERYTHROCYTE [DISTWIDTH] IN BLOOD BY AUTOMATED COUNT: 21.2 % (ref 11.5–14.5)
GLUCOSE BLD MANUAL STRIP-MCNC: 132 MG/DL (ref 74–99)
GLUCOSE BLD MANUAL STRIP-MCNC: 140 MG/DL (ref 74–99)
GLUCOSE BLD MANUAL STRIP-MCNC: 186 MG/DL (ref 74–99)
GLUCOSE SERPL-MCNC: 125 MG/DL (ref 74–99)
HAPTOGLOB SERPL NEPH-MCNC: 68 MG/DL (ref 30–200)
HCT VFR BLD AUTO: 24.2 % (ref 36–46)
HCT VFR BLD AUTO: 31.2 % (ref 36–46)
HGB BLD-MCNC: 6.9 G/DL (ref 12–16)
HGB BLD-MCNC: 9 G/DL (ref 12–16)
HGB RETIC QN: 17 PG (ref 28–38)
HOLD SPECIMEN: NORMAL
IMMATURE RETIC FRACTION: 44.2 %
INR PPP: 2.2 (ref 0.9–1.1)
LDH SERPL L TO P-CCNC: 151 U/L (ref 84–246)
LEFT ATRIUM VOLUME AREA LENGTH INDEX BSA: 12.9 ML/M2
LEFT VENTRICLE INTERNAL DIMENSION DIASTOLE: 5.79 CM (ref 3.5–6)
LEFT VENTRICULAR OUTFLOW TRACT DIAMETER: 2.27 CM
MCH RBC QN AUTO: 21.4 PG (ref 26–34)
MCH RBC QN AUTO: 22.5 PG (ref 26–34)
MCHC RBC AUTO-ENTMCNC: 28.5 G/DL (ref 32–36)
MCHC RBC AUTO-ENTMCNC: 28.8 G/DL (ref 32–36)
MCV RBC AUTO: 75 FL (ref 80–100)
MCV RBC AUTO: 78 FL (ref 80–100)
MITRAL VALVE E/A RATIO: 3.35
NRBC BLD-RTO: 0 /100 WBCS (ref 0–0)
NRBC BLD-RTO: 0 /100 WBCS (ref 0–0)
PLATELET # BLD AUTO: 303 X10*3/UL (ref 150–450)
PLATELET # BLD AUTO: 318 X10*3/UL (ref 150–450)
POTASSIUM SERPL-SCNC: 3.2 MMOL/L (ref 3.5–5.3)
PRODUCT BLOOD TYPE: 600
PRODUCT BLOOD TYPE: 6200
PRODUCT CODE: NORMAL
PRODUCT CODE: NORMAL
PROT SERPL-MCNC: 5.5 G/DL (ref 6.4–8.2)
PROTHROMBIN TIME: 24.1 SECONDS (ref 9.8–12.4)
RBC # BLD AUTO: 3.23 X10*6/UL (ref 4–5.2)
RBC # BLD AUTO: 4 X10*6/UL (ref 4–5.2)
RETICS #: 0.07 X10*6/UL (ref 0.02–0.11)
RETICS/RBC NFR AUTO: 2.1 % (ref 0.5–2)
RIGHT VENTRICLE FREE WALL PEAK S': 11 CM/S
RIGHT VENTRICLE PEAK SYSTOLIC PRESSURE: 33 MMHG
SODIUM SERPL-SCNC: 138 MMOL/L (ref 136–145)
TRICUSPID ANNULAR PLANE SYSTOLIC EXCURSION: 1.8 CM
UNIT ABO: NORMAL
UNIT ABO: NORMAL
UNIT NUMBER: NORMAL
UNIT NUMBER: NORMAL
UNIT RH: NORMAL
UNIT RH: NORMAL
UNIT VOLUME: 350
UNIT VOLUME: 350
WBC # BLD AUTO: 7.3 X10*3/UL (ref 4.4–11.3)
WBC # BLD AUTO: 7.8 X10*3/UL (ref 4.4–11.3)
XM INTEP: NORMAL
XM INTEP: NORMAL

## 2025-07-03 PROCEDURE — 2500000001 HC RX 250 WO HCPCS SELF ADMINISTERED DRUGS (ALT 637 FOR MEDICARE OP)

## 2025-07-03 PROCEDURE — 82947 ASSAY GLUCOSE BLOOD QUANT: CPT

## 2025-07-03 PROCEDURE — 86923 COMPATIBILITY TEST ELECTRIC: CPT

## 2025-07-03 PROCEDURE — 2500000004 HC RX 250 GENERAL PHARMACY W/ HCPCS (ALT 636 FOR OP/ED)

## 2025-07-03 PROCEDURE — 99140 ANES COMP EMERGENCY COND: CPT | Performed by: ANESTHESIOLOGY

## 2025-07-03 PROCEDURE — 3700000001 HC GENERAL ANESTHESIA TIME - INITIAL BASE CHARGE: Performed by: ANESTHESIOLOGY

## 2025-07-03 PROCEDURE — 93306 TTE W/DOPPLER COMPLETE: CPT | Performed by: STUDENT IN AN ORGANIZED HEALTH CARE EDUCATION/TRAINING PROGRAM

## 2025-07-03 PROCEDURE — 43235 EGD DIAGNOSTIC BRUSH WASH: CPT | Performed by: INTERNAL MEDICINE

## 2025-07-03 PROCEDURE — 85610 PROTHROMBIN TIME: CPT | Performed by: FAMILY MEDICINE

## 2025-07-03 PROCEDURE — 83615 LACTATE (LD) (LDH) ENZYME: CPT

## 2025-07-03 PROCEDURE — 3700000002 HC GENERAL ANESTHESIA TIME - EACH INCREMENTAL 1 MINUTE: Performed by: ANESTHESIOLOGY

## 2025-07-03 PROCEDURE — 94760 N-INVAS EAR/PLS OXIMETRY 1: CPT

## 2025-07-03 PROCEDURE — 85027 COMPLETE CBC AUTOMATED: CPT | Performed by: FAMILY MEDICINE

## 2025-07-03 PROCEDURE — 2500000002 HC RX 250 W HCPCS SELF ADMINISTERED DRUGS (ALT 637 FOR MEDICARE OP, ALT 636 FOR OP/ED): Performed by: FAMILY MEDICINE

## 2025-07-03 PROCEDURE — 7100000001 HC RECOVERY ROOM TIME - INITIAL BASE CHARGE: Performed by: ANESTHESIOLOGY

## 2025-07-03 PROCEDURE — A43235 PR ESOPHAGOGASTRODUODENOSCOPY TRANSORAL DIAGNOSTIC: Performed by: NURSE ANESTHETIST, CERTIFIED REGISTERED

## 2025-07-03 PROCEDURE — 2500000004 HC RX 250 GENERAL PHARMACY W/ HCPCS (ALT 636 FOR OP/ED): Performed by: FAMILY MEDICINE

## 2025-07-03 PROCEDURE — 99100 ANES PT EXTEME AGE<1 YR&>70: CPT | Performed by: ANESTHESIOLOGY

## 2025-07-03 PROCEDURE — 85045 AUTOMATED RETICULOCYTE COUNT: CPT

## 2025-07-03 PROCEDURE — 7100000002 HC RECOVERY ROOM TIME - EACH INCREMENTAL 1 MINUTE: Performed by: ANESTHESIOLOGY

## 2025-07-03 PROCEDURE — 2500000004 HC RX 250 GENERAL PHARMACY W/ HCPCS (ALT 636 FOR OP/ED): Performed by: NURSE ANESTHETIST, CERTIFIED REGISTERED

## 2025-07-03 PROCEDURE — 2500000005 HC RX 250 GENERAL PHARMACY W/O HCPCS: Performed by: FAMILY MEDICINE

## 2025-07-03 PROCEDURE — 2500000004 HC RX 250 GENERAL PHARMACY W/ HCPCS (ALT 636 FOR OP/ED): Performed by: INTERNAL MEDICINE

## 2025-07-03 PROCEDURE — 84075 ASSAY ALKALINE PHOSPHATASE: CPT | Performed by: FAMILY MEDICINE

## 2025-07-03 PROCEDURE — 93306 TTE W/DOPPLER COMPLETE: CPT

## 2025-07-03 PROCEDURE — 3600000007 HC OR TIME - EACH INCREMENTAL 1 MINUTE - PROCEDURE LEVEL TWO: Performed by: ANESTHESIOLOGY

## 2025-07-03 PROCEDURE — 83010 ASSAY OF HAPTOGLOBIN QUANT: CPT | Mod: GEALAB

## 2025-07-03 PROCEDURE — 2500000001 HC RX 250 WO HCPCS SELF ADMINISTERED DRUGS (ALT 637 FOR MEDICARE OP): Performed by: FAMILY MEDICINE

## 2025-07-03 PROCEDURE — P9040 RBC LEUKOREDUCED IRRADIATED: HCPCS

## 2025-07-03 PROCEDURE — 3600000002 HC OR TIME - INITIAL BASE CHARGE - PROCEDURE LEVEL TWO: Performed by: ANESTHESIOLOGY

## 2025-07-03 PROCEDURE — 36430 TRANSFUSION BLD/BLD COMPNT: CPT

## 2025-07-03 PROCEDURE — 99233 SBSQ HOSP IP/OBS HIGH 50: CPT

## 2025-07-03 PROCEDURE — 0DJ08ZZ INSPECTION OF UPPER INTESTINAL TRACT, VIA NATURAL OR ARTIFICIAL OPENING ENDOSCOPIC: ICD-10-PCS | Performed by: INTERNAL MEDICINE

## 2025-07-03 PROCEDURE — 82248 BILIRUBIN DIRECT: CPT

## 2025-07-03 PROCEDURE — A43235 PR ESOPHAGOGASTRODUODENOSCOPY TRANSORAL DIAGNOSTIC: Performed by: ANESTHESIOLOGY

## 2025-07-03 PROCEDURE — 36415 COLL VENOUS BLD VENIPUNCTURE: CPT | Performed by: FAMILY MEDICINE

## 2025-07-03 PROCEDURE — 36415 COLL VENOUS BLD VENIPUNCTURE: CPT

## 2025-07-03 PROCEDURE — 85027 COMPLETE CBC AUTOMATED: CPT

## 2025-07-03 PROCEDURE — 2060000001 HC INTERMEDIATE ICU ROOM DAILY

## 2025-07-03 RX ORDER — DEXMEDETOMIDINE IN 0.9 % NACL 20 MCG/5ML
SYRINGE (ML) INTRAVENOUS AS NEEDED
Status: DISCONTINUED | OUTPATIENT
Start: 2025-07-03 | End: 2025-07-03

## 2025-07-03 RX ORDER — FUROSEMIDE 10 MG/ML
40 INJECTION INTRAMUSCULAR; INTRAVENOUS ONCE
Status: COMPLETED | OUTPATIENT
Start: 2025-07-03 | End: 2025-07-03

## 2025-07-03 RX ORDER — SODIUM CHLORIDE, SODIUM LACTATE, POTASSIUM CHLORIDE, CALCIUM CHLORIDE 600; 310; 30; 20 MG/100ML; MG/100ML; MG/100ML; MG/100ML
INJECTION, SOLUTION INTRAVENOUS CONTINUOUS PRN
Status: DISCONTINUED | OUTPATIENT
Start: 2025-07-03 | End: 2025-07-03

## 2025-07-03 RX ORDER — LIDOCAINE HCL/PF 100 MG/5ML
SYRINGE (ML) INTRAVENOUS AS NEEDED
Status: DISCONTINUED | OUTPATIENT
Start: 2025-07-03 | End: 2025-07-03

## 2025-07-03 RX ORDER — POTASSIUM CHLORIDE 14.9 MG/ML
20 INJECTION INTRAVENOUS
Status: COMPLETED | OUTPATIENT
Start: 2025-07-03 | End: 2025-07-03

## 2025-07-03 RX ORDER — TRAMADOL HYDROCHLORIDE 50 MG/1
50 TABLET, FILM COATED ORAL EVERY 8 HOURS PRN
Status: ACTIVE | OUTPATIENT
Start: 2025-07-03

## 2025-07-03 RX ORDER — OXYCODONE HYDROCHLORIDE 5 MG/1
2.5 TABLET ORAL EVERY 8 HOURS PRN
Refills: 0 | Status: ACTIVE | OUTPATIENT
Start: 2025-07-03

## 2025-07-03 RX ORDER — PROPOFOL 10 MG/ML
INJECTION, EMULSION INTRAVENOUS AS NEEDED
Status: DISCONTINUED | OUTPATIENT
Start: 2025-07-03 | End: 2025-07-03

## 2025-07-03 RX ORDER — POTASSIUM CHLORIDE 20 MEQ/1
40 TABLET, EXTENDED RELEASE ORAL ONCE
Status: DISCONTINUED | OUTPATIENT
Start: 2025-07-03 | End: 2025-07-03

## 2025-07-03 RX ADMIN — LIDOCAINE HYDROCHLORIDE 60 MG: 20 INJECTION INTRAVENOUS at 11:59

## 2025-07-03 RX ADMIN — FUROSEMIDE 40 MG: 10 INJECTION, SOLUTION INTRAMUSCULAR; INTRAVENOUS at 10:21

## 2025-07-03 RX ADMIN — METOPROLOL TARTRATE 25 MG: 25 TABLET, FILM COATED ORAL at 21:15

## 2025-07-03 RX ADMIN — PHYTONADIONE 5 MG: 10 INJECTION, EMULSION INTRAMUSCULAR; INTRAVENOUS; SUBCUTANEOUS at 14:40

## 2025-07-03 RX ADMIN — CETIRIZINE HYDROCHLORIDE 10 MG: 10 TABLET, FILM COATED ORAL at 14:40

## 2025-07-03 RX ADMIN — Medication 2 L/MIN: at 19:41

## 2025-07-03 RX ADMIN — PROPOFOL 30 MG: 10 INJECTION, EMULSION INTRAVENOUS at 11:58

## 2025-07-03 RX ADMIN — SODIUM CHLORIDE, POTASSIUM CHLORIDE, SODIUM LACTATE AND CALCIUM CHLORIDE: 600; 310; 30; 20 INJECTION, SOLUTION INTRAVENOUS at 11:53

## 2025-07-03 RX ADMIN — PROPOFOL 30 MG: 10 INJECTION, EMULSION INTRAVENOUS at 12:00

## 2025-07-03 RX ADMIN — POTASSIUM CHLORIDE 20 MEQ: 14.9 INJECTION, SOLUTION INTRAVENOUS at 10:21

## 2025-07-03 RX ADMIN — PYRIDOXINE HCL TAB 50 MG 50 MG: 50 TAB at 21:15

## 2025-07-03 RX ADMIN — PANTOPRAZOLE SODIUM 40 MG: 40 INJECTION, POWDER, FOR SOLUTION INTRAVENOUS at 21:15

## 2025-07-03 RX ADMIN — Medication 12 MCG: at 11:58

## 2025-07-03 RX ADMIN — OXYBUTYNIN CHLORIDE 5 MG: 5 TABLET ORAL at 21:15

## 2025-07-03 RX ADMIN — POTASSIUM CHLORIDE 20 MEQ: 14.9 INJECTION, SOLUTION INTRAVENOUS at 13:12

## 2025-07-03 RX ADMIN — PANTOPRAZOLE SODIUM 40 MG: 40 INJECTION, POWDER, FOR SOLUTION INTRAVENOUS at 10:20

## 2025-07-03 SDOH — HEALTH STABILITY: MENTAL HEALTH: CURRENT SMOKER: 0

## 2025-07-03 ASSESSMENT — PAIN SCALES - GENERAL
PAIN_LEVEL: 2
PAINLEVEL_OUTOF10: 0 - NO PAIN
PAINLEVEL_OUTOF10: 0 - NO PAIN

## 2025-07-03 ASSESSMENT — COGNITIVE AND FUNCTIONAL STATUS - GENERAL
MOBILITY SCORE: 15
STANDING UP FROM CHAIR USING ARMS: A LOT
DAILY ACTIVITIY SCORE: 18
TURNING FROM BACK TO SIDE WHILE IN FLAT BAD: A LITTLE
CLIMB 3 TO 5 STEPS WITH RAILING: A LOT
HELP NEEDED FOR BATHING: A LITTLE
DRESSING REGULAR UPPER BODY CLOTHING: A LITTLE
MOVING TO AND FROM BED TO CHAIR: A LITTLE
DRESSING REGULAR LOWER BODY CLOTHING: A LITTLE
WALKING IN HOSPITAL ROOM: A LOT
MOVING FROM LYING ON BACK TO SITTING ON SIDE OF FLAT BED WITH BEDRAILS: A LITTLE
PERSONAL GROOMING: A LITTLE
TOILETING: A LITTLE
EATING MEALS: A LITTLE

## 2025-07-03 ASSESSMENT — PAIN - FUNCTIONAL ASSESSMENT: PAIN_FUNCTIONAL_ASSESSMENT: 0-10

## 2025-07-03 NOTE — CARE PLAN
The patient's goals for the shift include      The clinical goals for the shift include patient will remain hemodynamically stable.    Over the shift, the patient did make progress towards the aforementioned goal.  Pt's BP and HR remained stable during shift. Hgb shows improvement on repeat labs.

## 2025-07-03 NOTE — ANESTHESIA PREPROCEDURE EVALUATION
Patient: Vanessa Adame    Procedure Information       Date/Time: 25 1130    Scheduled providers: Olga Quintana MD    Procedure: EGD    Location: [facility] OR            Relevant Problems   Cardiac   (+) Benign essential HTN   (+) CHF (congestive heart failure)      GI   (+) GI bleed      /Renal   (+) Acute cystitis without hematuria   (+) Urinary tract infection in elderly patient      Hematology   (+) Acute blood loss anemia   (+) Acute deep vein thrombosis (DVT) of right peroneal vein (Multi)   (+) Coagulopathy (Multi)      ID   (+) MRSA infection   (+) Urinary tract infection in elderly patient       Clinical information reviewed:    Allergies  Meds               NPO Detail:  NPO/Void Status  Date of Last Liquid: 25  Date of Last Solid: 25         Physical Exam    Airway  Mallampati: II  TM distance: >3 FB  Neck ROM: full  Mouth openin finger widths     Cardiovascular   (+) murmur     Dental    Pulmonary - normal exam   Abdominal (+) obese             Anesthesia Plan    History of general anesthesia?: yes  History of complications of general anesthesia?: no    ASA 4 - emergent     MAC     The patient is not a current smoker.    intravenous induction   Anesthetic plan and risks discussed with patient.    Plan discussed with CRNA and attending.

## 2025-07-03 NOTE — ANESTHESIA POSTPROCEDURE EVALUATION
Patient: Vanessa Adame    Procedure Summary       Date: 07/03/25 Room / Location: [facility] OR    Anesthesia Start: 1153 Anesthesia Stop: 1210    Procedure: EGD Diagnosis:       GI bleed      Acute blood loss anemia    Scheduled Providers: Olga Quintana MD Responsible Provider: Jacques Mclean MD    Anesthesia Type: MAC ASA Status: 4 - Emergent            Anesthesia Type: MAC    Vitals Value Taken Time   /99 07/03/25 16:26   Temp 36.7 °C (98 °F) 07/03/25 16:25   Pulse 91 07/03/25 16:52   Resp 18 07/03/25 16:52   SpO2 98 % 07/03/25 16:25   Vitals shown include unfiled device data.    Anesthesia Post Evaluation    Patient location during evaluation: PACU  Patient participation: complete - patient participated  Level of consciousness: awake  Pain score: 2  Pain management: adequate  Multimodal analgesia pain management approach  Airway patency: patent  Two or more strategies used to mitigate risk of obstructive sleep apnea  Cardiovascular status: acceptable  Respiratory status: acceptable  Hydration status: acceptable  Postoperative Nausea and Vomiting: none        No notable events documented.     Call 911 for stroke/Need for follow up after discharge/Prescribed medications/Risk factors for stroke/Stroke education booklet/Stroke support groups for patients, families, and friends/Stroke warning signs and symptoms/Signs and symptoms of stroke

## 2025-07-03 NOTE — PROGRESS NOTES
Physical Therapy                 Therapy Communication Note    Patient Name: Vanessa Adame  MRN: [id number]  Department: [address]  Room: 248/[facility]  Today's Date: 7/3/2025     Discipline: Physical Therapy    Missed Visit: PT Missed Visit: Yes     Missed Visit Reason: Missed Visit Reason: Patient placed on medical hold. Protime/INR remain elevated at 24.1/2.2 (though improved from yesterday). However, H&H is now down-trending (7.4/24.8 yesterday > 6.9/24.2 today). Discussed with hospitalist, who recommends PT/OT hold this date, as pt is scheduled for EGD with GI. Notified OT.     Missed Time: Attempt    Comment: [phone]

## 2025-07-03 NOTE — PROGRESS NOTES
Occupational Therapy                 Therapy Communication Note    Patient Name: Vanessa Adame  MRN: [id number]  Department: [address]  Room: [address] [place], [place] [address]  Today's Date: 7/3/2025     Discipline: Occupational Therapy    Missed Visit: OT Missed Visit: Yes     Missed Visit Reason: Missed Visit Reason: Patient placed on medical hold, Patient in a medical procedure (Pt with low hemoglobin this morning, planned for an EGD later today. Will hold on OT evaluation at this time. Discussed with attending.)    Missed Time: Attempt

## 2025-07-03 NOTE — PROGRESS NOTES
Assessment/Plan:    Closed fracture of right hip with routine healing  Patient seems to be doing quite well after her hip replacement  At this point, I would not recommend any changes other than continue her current treatments  In the future we are going to need to check vitamin-D level, as well as DEXA scan  Insomnia  Patient does appear to be having some insomnia issues  She was on trazodone from discharge from hospital in [place], but it did seem to help her quite a bit with the sleep  Since then, she has not been able to sleep as well  Will renewed today, and I would also agree that it would help as far as depression anxiety symptoms  GERD (gastroesophageal reflux disease)  Patient has increasing amount of gas and belching  Recommend trial of Zantac  Her son mentioned that she tried omeprazole before and it did not seem to help  Also, with the recent hip fracture am concerned that omeprazole would not be the right option for her  Diagnoses and all orders for this visit:    Benign essential hypertension  -     ascorbic acid (VITAMIN C) 250 MG tablet; Take 1 tablet (250 mg total) by mouth daily  -     estradiol (ESTRACE) 0 1 mg/g vaginal cream; Use 3 times weekly on Mon, Wed, Friday  -     ferrous sulfate 324 (65 Fe) mg; Take 1 tablet (324 mg total) by mouth 2 (two) times a day before meals    Closed fracture of right hip with routine healing    Malignant neoplasm of female breast, unspecified estrogen receptor status, unspecified laterality, unspecified site of breast (HCC)    Psychophysiological insomnia  -     traZODone (DESYREL) 50 mg tablet; Take 1 tablet (50 mg total) by mouth daily at bedtime for 90 days    Gastroesophageal reflux disease without esophagitis  -     ranitidine (ZANTAC) 150 MG capsule;  Take 1 capsule (150 mg total) by mouth 2 (two) times a day    Other orders  -     Calcium Carbonate-Vit D-Min (CALTRATE 600+D PLUS MINERALS) 600-800 MG-UNIT CHEW; Chew  - Vanessa Adame is a 89 y.o. female on day 1 of admission presenting with GI bleed.    Subjective     Patient resting comfortably in bed with no complaints. She however did voice frustration with family although they have been supportive during patient's hospital stay     Objective   Physical Exam  Constitutional:       General: She is not in acute distress.     Appearance: She is not toxic-appearing.   HENT:      Head: Normocephalic and atraumatic.      Nose: Nose normal.   Eyes:      Conjunctiva/sclera: Conjunctivae normal.   Cardiovascular:      Rate and Rhythm: Normal rate and regular rhythm.      Heart sounds: +2 systolic murmur  Pulmonary:      Effort: Respiratory distress not present.      Breath sounds: Rales present.   Abdominal:      General: Bowel sounds are normal. There is no distension.      Palpations: Abdomen is soft.   Musculoskeletal:         General: Swelling and tenderness present.      Right lower leg: Edema present.      Left lower leg: Edema present.   Skin:     General: Skin is warm.   Neurological:      General: No focal deficit present.      Mental Status: She is alert.   Psychiatric:         Mood and Affect: Mood normal.        Last Recorded Vitals  Blood pressure 117/52, pulse 80, temperature 36.8 °C (98.2 °F), temperature source Temporal, resp. rate 24, height (!) 1.524 m (5'), weight 112 kg (246 lb), SpO2 90%.  Intake/Output last 3 Shifts:  I/O last 3 completed shifts:  In: 220 (2.9 mL/kg) [P.O.:220]  Out: 400 (5.2 mL/kg) [Urine:400 (0.1 mL/kg/hr)]  Weight: 77.1 kg     Relevant Results  Scheduled medications  Scheduled Medications[1]  Continuous medications  Continuous Medications[2]  PRN medications  PRN Medications[3]    Results for orders placed or performed during the hospital encounter of 07/02/25 (from the past 24 hours)   Urinalysis with Reflex Microscopic   Result Value Ref Range    Color, Urine Yellow Light-Yellow, Yellow, Dark-Yellow    Appearance, Urine Clear Clear     Specific Gravity, Urine 1.047 (N) 1.005 - 1.035    pH, Urine 6.0 5.0, 5.5, 6.0, 6.5, 7.0, 7.5, 8.0    Protein, Urine NEGATIVE NEGATIVE, 10 (TRACE), 20 (TRACE) mg/dL    Glucose, Urine Normal Normal mg/dL    Blood, Urine NEGATIVE NEGATIVE mg/dL    Ketones, Urine NEGATIVE NEGATIVE mg/dL    Bilirubin, Urine NEGATIVE NEGATIVE mg/dL    Urobilinogen, Urine Normal Normal mg/dL    Nitrite, Urine NEGATIVE NEGATIVE    Leukocyte Esterase, Urine NEGATIVE NEGATIVE   PST Top   Result Value Ref Range    Extra Tube Hold for add-ons.    B-type natriuretic peptide   Result Value Ref Range    BNP 1,098 (H) 0 - 99 pg/mL   POCT GLUCOSE   Result Value Ref Range    POCT Glucose 119 (H) 74 - 99 mg/dL   CBC   Result Value Ref Range    WBC 7.3 4.4 - 11.3 x10*3/uL    nRBC 0.3 (H) 0.0 - 0.0 /100 WBCs    RBC 3.41 (L) 4.00 - 5.20 x10*6/uL    Hemoglobin 7.4 (L) 12.0 - 16.0 g/dL    Hematocrit 24.8 (L) 36.0 - 46.0 %    MCV 73 (L) 80 - 100 fL    MCH 21.7 (L) 26.0 - 34.0 pg    MCHC 29.8 (L) 32.0 - 36.0 g/dL    RDW 19.9 (H) 11.5 - 14.5 %    Platelets 311 150 - 450 x10*3/uL   Protime-INR   Result Value Ref Range    Protime 31.4 (H) 9.8 - 12.4 seconds    INR 2.8 (H) 0.9 - 1.1   POCT GLUCOSE   Result Value Ref Range    POCT Glucose 161 (H) 74 - 99 mg/dL   POCT GLUCOSE   Result Value Ref Range    POCT Glucose 151 (H) 74 - 99 mg/dL   POCT GLUCOSE   Result Value Ref Range    POCT Glucose 121 (H) 74 - 99 mg/dL   POCT GLUCOSE   Result Value Ref Range    POCT Glucose 140 (H) 74 - 99 mg/dL   CBC   Result Value Ref Range    WBC 7.3 4.4 - 11.3 x10*3/uL    nRBC 0.0 0.0 - 0.0 /100 WBCs    RBC 3.23 (L) 4.00 - 5.20 x10*6/uL    Hemoglobin 6.9 (L) 12.0 - 16.0 g/dL    Hematocrit 24.2 (L) 36.0 - 46.0 %    MCV 75 (L) 80 - 100 fL    MCH 21.4 (L) 26.0 - 34.0 pg    MCHC 28.5 (L) 32.0 - 36.0 g/dL    RDW 20.4 (H) 11.5 - 14.5 %    Platelets 303 150 - 450 x10*3/uL   Comprehensive Metabolic Panel   Result Value Ref Range    Glucose 125 (H) 74 - 99 mg/dL    Sodium 138 136 - 145  methimazole (TAPAZOLE) 10 mg tablet; Take 10 mg by mouth every morning  -     metoprolol succinate (TOPROL-XL) 25 mg 24 hr tablet; TAKE 1 TABLET BY MOUTH DAILY EVERY MORNING  -     tamoxifen (NOLVADEX) 20 mg tablet; Take 20 mg by mouth daily  -     Cancel: traZODone (DESYREL) 50 mg tablet; Take 1 tablet (50 mg total) by mouth daily at bedtime          Subjective:      Patient ID: Yousif Connell is a 67 y o  female  Follow up re: Right Partial Hip replacement after a fall in  [place] on 1/5/2018 and surgery was 1/8/2018  Pt is doing well but having difficulty sleeping  Kw    Patient is here to follow-up after a hip replacement that was done in [facility]  She had fallen, and broken her hip  She is now back to this area after having it taken care of in [place]  Of note, the injury occurred after a slip on ice  After her discharge from [place], she was sent to rehab at [facility]  After the acute rehab from [facility], the patient was doing so well that she did not need any further longer term rehab  She was then discharged home  We are now following her after this discharge to home  Physical therapy and occupational therapy are both coming to her home currently  Visiting nurses are coming to her home  Patient does have a history of GERD  She has been having an increasing amount of gas lately with this  She was on trazodone at both [facility] rehab and during her hospitalization in [place]  This was helping her quite a bit with regard to insomnia  On discharge from [facility], this was not entered for prescription  Of note, was also quite helpful for her depression symptoms  She would like to continue it  Breast cancer:  Patient is currently on medications  No particular concerns at the moment            The following portions of the patient's history were reviewed and updated as appropriate: allergies, current medications, past family history, past medical history, past social history, past surgical history and problem list     Review of Systems   Constitutional: Negative  HENT: Negative  Eyes: Negative  Respiratory: Negative  Cardiovascular: Negative  Gastrointestinal:        Per HPI   Endocrine: Negative  Genitourinary: Negative  Musculoskeletal: Negative  Skin: Negative  Allergic/Immunologic: Negative  Neurological: Negative  Hematological: Negative  Psychiatric/Behavioral: Negative  Objective:    Vitals:    02/09/18 1347   BP: 100/58   BP Location: Right arm   Pulse: 80   Weight: 49 6 kg (109 lb 6 4 oz)   Height: 5' 3" (1 6 m)      Physical Exam   Constitutional: She appears well-developed and well-nourished  HENT:   Head: Normocephalic  Cardiovascular: Normal rate, regular rhythm and normal heart sounds  Exam reveals no gallop and no friction rub  No murmur heard  Abdominal: Soft  Bowel sounds are normal  She exhibits no mass  There is no tenderness  There is no rebound and no guarding  Musculoskeletal: Normal range of motion  Nursing note and vitals reviewed  mmol/L    Potassium 3.2 (L) 3.5 - 5.3 mmol/L    Chloride 102 98 - 107 mmol/L    Bicarbonate 29 21 - 32 mmol/L    Anion Gap 10 10 - 20 mmol/L    Urea Nitrogen 9 6 - 23 mg/dL    Creatinine 0.85 0.50 - 1.05 mg/dL    eGFR 66 >60 mL/min/1.73m*2    Calcium 9.0 8.6 - 10.3 mg/dL    Albumin 3.2 (L) 3.4 - 5.0 g/dL    Alkaline Phosphatase 64 33 - 136 U/L    Total Protein 5.5 (L) 6.4 - 8.2 g/dL    AST 13 9 - 39 U/L    Bilirubin, Total 1.6 (H) 0.0 - 1.2 mg/dL    ALT 8 7 - 45 U/L   Protime-INR   Result Value Ref Range    Protime 24.1 (H) 9.8 - 12.4 seconds    INR 2.2 (H) 0.9 - 1.1   POCT GLUCOSE   Result Value Ref Range    POCT Glucose 132 (H) 74 - 99 mg/dL          Assessment & Plan  GI bleed    Acute blood loss anemia    Coagulopathy (Multi)    Bilateral leg edema      Vanessa Adame is a 89 y.o. female with a past medical history of DVT on Coumadin, CHF, CKD, dementia, diabetes mellitus type 2,  overactive bladder, spinal stenosis and UTI who initially presented with weakness  admitted for an acutely worsening anemia     #acutely worsening Anemia with uncertain etiology    #supratherapeutic INR   CT abdomen and pelvis without evidence of acute diverticulitis and patient denies any overt blood in stools so GI bleed less likely. However, was previously admitted for diverticulitis   -will now consider hematologic causes; work-up initiated   -GI consulted, appreciate recs  -->plan for EGD 7/3 at 11:30 am   -INR improved 3.8-->2.2  -Hg am 6.9 s/p transfusion 1 unit RBC  overnight   -s/p total RBC transfusion x 4 units; s/p vitamin K 5 mg and FFP x 1  -recheck CBC at 1400.   --transfuse if Hg  <7  -protonix 40 mg IV BID      # LE Edema/pain  -US of lower extremity negative for DVT in R leg  -patient refused LLE due to pain  -BNP elevated; ECHO ordered  -weaned to baseline 2 L overnight   -home lasix 40 mg daily  -multimodal pain regimen (scheduled tylenol, tramadol 50 mg Q8H for moderate and oxycodone 2.5 mg Q8H for severe)      Hx of DVT  -diagnosed during admission in 2/25 for diverticulitis  -uncertain if provoked or unprovoked     Hypotension  -resolved  -holding enalapril 20 mg  -resuming lasix 40 mg daily   -holding metoprolol 25 mg BID     MARIA ALEJANDRA v nocturnal hypoxia   -on 2 L NC overnight   -no new O2 requirements      cystitis  -holding Macrobid   -urine culture negative   -repeat UA ordered      Hypokalemia  -replete PRN    Prediabetic   -sliding scale    Diet: NPO  DVT prophylaxis: SCD's once pain is controlled      Dispo: patient requires a higher level of care due to risk of acute decompensation due to anemia      Tim Saenz,   PGY-3         [1] cetirizine, 10 mg, oral, Daily  [Held by provider] enalapril, 20 mg, oral, Daily  furosemide, 40 mg, intravenous, Once  [Held by provider] furosemide, 40 mg, oral, Daily  insulin lispro, 0-5 Units, subcutaneous, q4h  [Held by provider] metoprolol tartrate, 25 mg, oral, BID  oxyBUTYnin, 5 mg, oral, BID  pantoprazole, 40 mg, intravenous, BID  polyethylene glycol, 17 g, oral, Daily  potassium chloride, 20 mEq, intravenous, q2h  pyridoxine, 50 mg, oral, BID  [2]    [3] PRN medications: acetaminophen **OR** acetaminophen **OR** acetaminophen, calcium carbonate, dextrose, dextrose, fluticasone, glucagon, glucagon, melatonin, ondansetron **OR** ondansetron, polyethylene glycol

## 2025-07-04 LAB
ALBUMIN SERPL BCP-MCNC: 3.2 G/DL (ref 3.4–5)
ALP SERPL-CCNC: 69 U/L (ref 33–136)
ALT SERPL W P-5'-P-CCNC: 9 U/L (ref 7–45)
ANION GAP SERPL CALC-SCNC: 11 MMOL/L (ref 10–20)
AST SERPL W P-5'-P-CCNC: 21 U/L (ref 9–39)
BILIRUB SERPL-MCNC: 2.1 MG/DL (ref 0–1.2)
BUN SERPL-MCNC: 9 MG/DL (ref 6–23)
CALCIUM SERPL-MCNC: 9 MG/DL (ref 8.6–10.3)
CHLORIDE SERPL-SCNC: 100 MMOL/L (ref 98–107)
CO2 SERPL-SCNC: 31 MMOL/L (ref 21–32)
CREAT SERPL-MCNC: 0.88 MG/DL (ref 0.5–1.05)
EGFRCR SERPLBLD CKD-EPI 2021: 63 ML/MIN/1.73M*2
ERYTHROCYTE [DISTWIDTH] IN BLOOD BY AUTOMATED COUNT: 21 % (ref 11.5–14.5)
GLUCOSE BLD MANUAL STRIP-MCNC: 128 MG/DL (ref 74–99)
GLUCOSE BLD MANUAL STRIP-MCNC: 132 MG/DL (ref 74–99)
GLUCOSE BLD MANUAL STRIP-MCNC: 147 MG/DL (ref 74–99)
GLUCOSE BLD MANUAL STRIP-MCNC: 154 MG/DL (ref 74–99)
GLUCOSE SERPL-MCNC: 130 MG/DL (ref 74–99)
HCT VFR BLD AUTO: 27.6 % (ref 36–46)
HGB BLD-MCNC: 8.2 G/DL (ref 12–16)
INR PPP: 1.4 (ref 0.9–1.1)
MCH RBC QN AUTO: 22 PG (ref 26–34)
MCHC RBC AUTO-ENTMCNC: 29.7 G/DL (ref 32–36)
MCV RBC AUTO: 74 FL (ref 80–100)
NRBC BLD-RTO: 0 /100 WBCS (ref 0–0)
PLATELET # BLD AUTO: 297 X10*3/UL (ref 150–450)
POTASSIUM SERPL-SCNC: 3.7 MMOL/L (ref 3.5–5.3)
PROT SERPL-MCNC: 5.9 G/DL (ref 6.4–8.2)
PROTHROMBIN TIME: 15.7 SECONDS (ref 9.8–12.4)
RBC # BLD AUTO: 3.73 X10*6/UL (ref 4–5.2)
SODIUM SERPL-SCNC: 138 MMOL/L (ref 136–145)
WBC # BLD AUTO: 8.9 X10*3/UL (ref 4.4–11.3)

## 2025-07-04 PROCEDURE — 85027 COMPLETE CBC AUTOMATED: CPT | Performed by: FAMILY MEDICINE

## 2025-07-04 PROCEDURE — 2500000004 HC RX 250 GENERAL PHARMACY W/ HCPCS (ALT 636 FOR OP/ED)

## 2025-07-04 PROCEDURE — 2500000005 HC RX 250 GENERAL PHARMACY W/O HCPCS: Performed by: INTERNAL MEDICINE

## 2025-07-04 PROCEDURE — 82947 ASSAY GLUCOSE BLOOD QUANT: CPT

## 2025-07-04 PROCEDURE — 97165 OT EVAL LOW COMPLEX 30 MIN: CPT | Mod: GO

## 2025-07-04 PROCEDURE — 94760 N-INVAS EAR/PLS OXIMETRY 1: CPT

## 2025-07-04 PROCEDURE — 80053 COMPREHEN METABOLIC PANEL: CPT | Performed by: FAMILY MEDICINE

## 2025-07-04 PROCEDURE — 2500000001 HC RX 250 WO HCPCS SELF ADMINISTERED DRUGS (ALT 637 FOR MEDICARE OP): Performed by: FAMILY MEDICINE

## 2025-07-04 PROCEDURE — 36415 COLL VENOUS BLD VENIPUNCTURE: CPT

## 2025-07-04 PROCEDURE — 2060000001 HC INTERMEDIATE ICU ROOM DAILY

## 2025-07-04 PROCEDURE — 2500000004 HC RX 250 GENERAL PHARMACY W/ HCPCS (ALT 636 FOR OP/ED): Performed by: INTERNAL MEDICINE

## 2025-07-04 PROCEDURE — 36415 COLL VENOUS BLD VENIPUNCTURE: CPT | Performed by: FAMILY MEDICINE

## 2025-07-04 PROCEDURE — 2500000001 HC RX 250 WO HCPCS SELF ADMINISTERED DRUGS (ALT 637 FOR MEDICARE OP)

## 2025-07-04 PROCEDURE — 2500000005 HC RX 250 GENERAL PHARMACY W/O HCPCS: Performed by: FAMILY MEDICINE

## 2025-07-04 PROCEDURE — 2500000002 HC RX 250 W HCPCS SELF ADMINISTERED DRUGS (ALT 637 FOR MEDICARE OP, ALT 636 FOR OP/ED): Performed by: FAMILY MEDICINE

## 2025-07-04 PROCEDURE — 85610 PROTHROMBIN TIME: CPT

## 2025-07-04 PROCEDURE — 97162 PT EVAL MOD COMPLEX 30 MIN: CPT | Mod: GP

## 2025-07-04 RX ADMIN — CETIRIZINE HYDROCHLORIDE 10 MG: 10 TABLET, FILM COATED ORAL at 08:42

## 2025-07-04 RX ADMIN — OXYBUTYNIN CHLORIDE 5 MG: 5 TABLET ORAL at 20:15

## 2025-07-04 RX ADMIN — PYRIDOXINE HCL TAB 50 MG 50 MG: 50 TAB at 08:43

## 2025-07-04 RX ADMIN — METOPROLOL TARTRATE 25 MG: 25 TABLET, FILM COATED ORAL at 08:43

## 2025-07-04 RX ADMIN — POLYETHYLENE GLYCOL 3350 17 G: 17 POWDER, FOR SOLUTION ORAL at 08:45

## 2025-07-04 RX ADMIN — Medication 2 L/MIN: at 08:03

## 2025-07-04 RX ADMIN — PYRIDOXINE HCL TAB 50 MG 50 MG: 50 TAB at 20:15

## 2025-07-04 RX ADMIN — METOPROLOL TARTRATE 25 MG: 25 TABLET, FILM COATED ORAL at 20:15

## 2025-07-04 RX ADMIN — PANTOPRAZOLE SODIUM 40 MG: 40 INJECTION, POWDER, FOR SOLUTION INTRAVENOUS at 08:43

## 2025-07-04 RX ADMIN — OXYBUTYNIN CHLORIDE 5 MG: 5 TABLET ORAL at 08:42

## 2025-07-04 RX ADMIN — PANTOPRAZOLE SODIUM 40 MG: 40 INJECTION, POWDER, FOR SOLUTION INTRAVENOUS at 20:16

## 2025-07-04 RX ADMIN — Medication 3 MG: at 20:15

## 2025-07-04 ASSESSMENT — COGNITIVE AND FUNCTIONAL STATUS - GENERAL
MOBILITY SCORE: 14
WALKING IN HOSPITAL ROOM: TOTAL
MOVING TO AND FROM BED TO CHAIR: A LITTLE
MOVING TO AND FROM BED TO CHAIR: A LOT
HELP NEEDED FOR BATHING: A LOT
STANDING UP FROM CHAIR USING ARMS: A LITTLE
MOBILITY SCORE: 14
DRESSING REGULAR UPPER BODY CLOTHING: A LITTLE
STANDING UP FROM CHAIR USING ARMS: A LOT
DAILY ACTIVITIY SCORE: 18
CLIMB 3 TO 5 STEPS WITH RAILING: A LOT
HELP NEEDED FOR BATHING: A LOT
HELP NEEDED FOR BATHING: A LITTLE
DRESSING REGULAR LOWER BODY CLOTHING: A LITTLE
PERSONAL GROOMING: A LITTLE
PERSONAL GROOMING: A LOT
MOVING FROM LYING ON BACK TO SITTING ON SIDE OF FLAT BED WITH BEDRAILS: A LITTLE
DRESSING REGULAR LOWER BODY CLOTHING: A LOT
WALKING IN HOSPITAL ROOM: A LOT
CLIMB 3 TO 5 STEPS WITH RAILING: A LOT
WALKING IN HOSPITAL ROOM: A LOT
DAILY ACTIVITIY SCORE: 14
MOVING FROM LYING ON BACK TO SITTING ON SIDE OF FLAT BED WITH BEDRAILS: A LITTLE
MOVING TO AND FROM BED TO CHAIR: A LITTLE
DRESSING REGULAR UPPER BODY CLOTHING: A LITTLE
TOILETING: A LOT
TOILETING: A LOT
DAILY ACTIVITIY SCORE: 16
TOILETING: A LITTLE
CLIMB 3 TO 5 STEPS WITH RAILING: TOTAL
TURNING FROM BACK TO SIDE WHILE IN FLAT BAD: A LITTLE
STANDING UP FROM CHAIR USING ARMS: A LOT
DRESSING REGULAR UPPER BODY CLOTHING: A LOT
TURNING FROM BACK TO SIDE WHILE IN FLAT BAD: A LITTLE
MOVING FROM LYING ON BACK TO SITTING ON SIDE OF FLAT BED WITH BEDRAILS: A LITTLE
PERSONAL GROOMING: A LITTLE
DRESSING REGULAR LOWER BODY CLOTHING: A LOT
MOBILITY SCORE: 15
TURNING FROM BACK TO SIDE WHILE IN FLAT BAD: A LITTLE
EATING MEALS: A LITTLE

## 2025-07-04 ASSESSMENT — PAIN - FUNCTIONAL ASSESSMENT
PAIN_FUNCTIONAL_ASSESSMENT: 0-10

## 2025-07-04 ASSESSMENT — ACTIVITIES OF DAILY LIVING (ADL)
BATHING_ASSISTANCE: MODERATE
ADL_ASSISTANCE: INDEPENDENT
ADL_ASSISTANCE: INDEPENDENT

## 2025-07-04 ASSESSMENT — PAIN SCALES - GENERAL
PAINLEVEL_OUTOF10: 0 - NO PAIN

## 2025-07-04 NOTE — PROGRESS NOTES
Physical Therapy    Physical Therapy Evaluation    Patient Name: Vanessa Adame  MRN: [id number]  Department: [address]  Room: [address] [place] [address]  Today's Date: 7/4/2025   Time Calculation  Start Time: 0835  Stop Time: 0902  Time Calculation (min): 27 min    Assessment/Plan   PT Assessment  PT Assessment Results: Decreased strength, Decreased endurance, Impaired balance, Decreased mobility  Rehab Prognosis: Good  Barriers to Discharge Home: Caregiver assistance, Physical needs  Caregiver Assistance: Caregiver assistance needed per identified barriers - however, level of patient's required assistance exceeds assistance available at home  Physical Needs: Intermittent mobility assistance needed, Intermittent ADL assistance needed, High falls risk due to function or environment  Evaluation/Treatment Tolerance: Patient limited by fatigue  Medical Staff Made Aware: Yes  Strengths: Ability to acquire knowledge, Housing layout, Support of Caregivers  Barriers to Participation: Comorbidities  End of Session Communication: Bedside nurse  Assessment Comment: Patient held past two days for therapy evaluation d/t low H/H, received blood transfusions and EGD. Currently H/H improving however remains decreased compared to normal; patient is easily fatigued. Patient is typically mod I for functional mobility, ambulates to dining room for all meals with rollator.   Patient would continue to benefit form MOD intensity PT intervention in order to return to PLOF  Based on current functional status and rehab potential, patient is anticipated to tolerate and benefit from 5 or more days per week of skilled rehabilitative therapy after discharge from this acute inpatient hospitalization.    End of Session Patient Position: Up in chair, Alarm on (all needs within reach and son in room)  IP OR SWING BED PT PLAN  Inpatient or Swing Bed: Inpatient  PT Plan  Treatment/Interventions: Transfer training, Gait training, Stair training, Balance training,  Strengthening, Endurance training, Therapeutic exercise  PT Plan: Ongoing PT  PT Frequency: 3 times per week  PT Discharge Recommendations: Moderate intensity level of continued care  Equipment Recommended upon Discharge: Wheeled walker (owns)  PT Recommended Transfer Status: Assist x1 (Recommended use of BSC at this time, d/t low activity tolerance)  PT - OK to Discharge: Yes (per PT POC)    Subjective     PT Visit Info:  PT Received On: 07/04/25  General Visit Information:  General  Reason for Referral: Impaired functional mobility; GI Bleed  Referred By: Sushila Alegre  Past Medical History Relevant to Rehab: DVT on Coumadin, anxiety, CHF, CKD, dementia, diabetes mellitus type 2, nocturnal hypoxia (patient wears 1 to 2 L of oxygen at nighttime), hyperlipidemia, hypertension, hypokalemia, overactive bladder, spinal stenosis and UT  Family/Caregiver Present: Yes (son)  Prior to Session Communication: Bedside nurse  Patient Position Received: Bed, 3 rail up, Alarm on  General Comment: Patient pleasant, cooperative and agreeable to PT eval  Home Living:  Home Living  Type of Home: Assisted living  Home Adaptive Equipment: Walker rolling or standard (Rollator)  Home Layout: One level  Home Access: Level entry  Bathroom Shower/Tub: Walk-in shower  Bathroom Toilet: Handicapped height  Bathroom Equipment: Grab bars in shower, Shower chair with back  Prior Level of Function:  Prior Function Per Pt/Caregiver Report  Level of [place]: Independent with ADLs and functional transfers, Needs assistance with homemaking  Receives Help From: Family  ADL Assistance: Independent (Patient reports staff is in her apartment when she showers)  Homemaking Assistance: Needs assistance (Facility staff manages)  Ambulatory Assistance: Independent (Rollator)  Precautions:  Precautions  Medical Precautions: Fall precautions (1L O2, purewick, tele)      Date/Time Vitals Session Patient Position Pulse Resp SpO2 BP MAP (mmHg)             "                         07/04/25 0835 --  --  69  --  94 %  109/96  --     07/04/25 0843 --  --  76  --  --  109/96  --                 Objective   Pain:  Pain Assessment  Pain Assessment: 0-10  0-10 (Numeric) Pain Score: 0 - No pain  Cognition:  Cognition  Overall Cognitive Status: Impaired  Orientation Level: Disoriented to situation    General Assessments:  General Observation  General Observation: Patient easily fatigued     Activity Tolerance  Endurance: Tolerates 10 - 20 min exercise with multiple rests    Sensation  Light Touch: No apparent deficits    Strength  Strength Comments: B LE 3+/5    Coordination  Movements are Fluid and Coordinated: Yes    Postural Control  Postural Control: Within Functional Limits    Static Sitting Balance  Static Sitting-Balance Support: No upper extremity supported, Feet supported  Static Sitting-Level of Assistance: Independent  Dynamic Sitting Balance  Dynamic Sitting-Balance Support: No upper extremity supported, Feet supported  Dynamic Sitting-Level of Assistance: Close supervision  Dynamic Sitting-Balance: Forward lean    Static Standing Balance  Static Standing-Balance Support: Bilateral upper extremity supported  Static Standing-Level of Assistance: Contact guard  Functional Assessments:       Bed Mobility  Bed Mobility: Yes  Bed Mobility 1  Bed Mobility 1: Supine to sitting  Level of Assistance 1: Minimum assistance (trunk)  Bed Mobility Comments 1: Patient reports she sleeps in recliner chair at home, hasn't slept in a bed \"for years\"    Transfers  Transfer: Yes  Transfer 1  Transfer From 1: Sit to, Stand to  Transfer to 1: Sit, Stand  Technique 1: Sit to stand, Stand to sit  Transfer Device 1: Walker  Transfer Level of Assistance 1: Contact guard    Ambulation/Gait Training  Ambulation/Gait Training Performed: Yes  Ambulation/Gait Training 1  Surface 1: Level tile  Device 1: Rolling walker  Assistance 1: Contact guard  Quality of Gait 1:  (decreased step length, " decreased mei)  Comments/Distance (ft) 1: 3' (bed>chair)    Outcome Measures:  [facility] Basic Mobility  Turning from your back to your side while in a flat bed without using bedrails: A little  Moving from lying on your back to sitting on the side of a flat bed without using bedrails: A little  Moving to and from bed to chair (including a wheelchair): A little  Standing up from a chair using your arms (e.g. wheelchair or bedside chair): A little  To walk in hospital room: Total  Climbing 3-5 steps with railing: Total  Basic Mobility - Total Score: 14    Encounter Problems       Encounter Problems (Active)       Balance       STG - Maintains dynamic standing balance with upper extremity support x 3' with dual task supervision  (Progressing)       Start:  07/04/25    Expected End:  07/18/25               Mobility       STG - Patient will ambulate with walker 50' mod I  (Progressing)       Start:  07/04/25    Expected End:  07/18/25               PT Transfers       STG - Patient will transfer sit to and from stand mod I  (Progressing)       Start:  07/04/25    Expected End:  07/18/25                   Education Documentation  Precautions, taught by Leigh Lagos PT at 7/4/2025  9:33 AM.  Learner: Family, Patient  Readiness: Acceptance  Method: Explanation  Response: Verbalizes Understanding  Comment: Importance of staff assist for mobility, use of walker, energy conservation and monitoring vitals.    Mobility Training, taught by Leigh Lagos PT at 7/4/2025  9:33 AM.  Learner: Family, Patient  Readiness: Acceptance  Method: Explanation  Response: Verbalizes Understanding  Comment: Importance of staff assist for mobility, use of walker, energy conservation and monitoring vitals.    Education Comments  No comments found.

## 2025-07-04 NOTE — CARE PLAN
The clinical goals for the shift include Patient will remain HDS this shift    Problem: Safety - Adult  Goal: Free from fall injury  Outcome: Progressing     Problem: Chronic Conditions and Co-morbidities  Goal: Patient's chronic conditions and co-morbidity symptoms are monitored and maintained or improved  Outcome: Progressing     Problem: Skin  Goal: Prevent/minimize sheer/friction injuries  Outcome: Progressing

## 2025-07-04 NOTE — PROGRESS NOTES
Occupational Therapy    Evaluation    Patient Name: Vanessa Adame  MRN: [id number]  Department: [address]  Room: [address] [place] [address]  Today's Date: 7/4/2025  Time Calculation  Start Time: 1113  Stop Time: 1134  Time Calculation (min): 21 min        Assessment:  OT Assessment: OT assessment complete to assess pt's ADLs, IADLs, functional t/f, functional mobility, general executive functional skills, and safety with AE and LRD in the inpatient environment to help give skilled insight for pt's need for OT services inside the inpatient setting and d/c location.  Prognosis: Good  Barriers to Discharge Home: No anticipated barriers  Evaluation/Treatment Tolerance: Patient tolerated treatment well, Other (Comment) (some limitation with following commands secondary to confusion)  Medical Staff Made Aware: Yes  End of Session Communication: Bedside nurse  End of Session Patient Position: Up in chair, Alarm on (all needs within reach and son in room)  OT Assessment Results: Decreased ADL status, Decreased upper extremity strength, Decreased safe judgment during ADL, Decreased cognition, Decreased endurance, Decreased fine motor control, Decreased functional mobility, Decreased gross motor control  Prognosis: Good  Barriers to Discharge: None  Evaluation/Treatment Tolerance: Patient tolerated treatment well, Other (Comment) (some limitation with following commands secondary to confusion)  Medical Staff Made Aware: Yes  Strengths: Support of extended family/friends  Barriers to Participation: Comorbidities  Plan:  Treatment Interventions: ADL retraining, UE strengthening/ROM, Endurance training, Cognitive reorientation, Patient/family training, Fine motor coordination activities, Compensatory technique education  OT Frequency: 3 times per week (5 or more days per week of skilled rehabilitative therapy after discharge from this acute inpatient hospitalization. 3x a week is only for acute inpatient hospitization recommendation)  OT  Discharge Recommendations: Moderate intensity level of continued care (Based on current functional status and rehab potential, patient is anticipated to tolerate and benefit from 5 or more days per week of skilled rehabilitative therapy after discharge from this acute inpatient hospitalization.)  Equipment Recommended upon Discharge: Wheeled walker (owns)  OT Recommended Transfer Status: Moderate assist, Stand by assist (pt's level of assistance will be impacted by pt's confusion level)  OT - OK to Discharge: Yes  Treatment Interventions: ADL retraining, UE strengthening/ROM, Endurance training, Cognitive reorientation, Patient/family training, Fine motor coordination activities, Compensatory technique education    Subjective   Current Problem:  1. Acute blood loss anemia  Esophagogastroduodenoscopy (EGD)    Esophagogastroduodenoscopy (EGD)      2. GI bleed  Esophagogastroduodenoscopy (EGD)    Esophagogastroduodenoscopy (EGD)      3. Bilateral leg edema  Vascular US lower extremity venous duplex bilateral    Vascular US lower extremity venous duplex bilateral    Transthoracic Echo Complete    Transthoracic Echo Complete      4. Shortness of breath          OT Visit Info:  OT Received On: 07/04/25  General:  General  Reason for Referral: Impaired functional mobility; GI Bleed  Referred By: Sushila Alegre  Past Medical History Relevant to Rehab: DVT on Coumadin, anxiety, CHF, CKD, dementia, diabetes mellitus type 2, nocturnal hypoxia (patient wears 1 to 2 L of oxygen at nighttime), hyperlipidemia, hypertension, hypokalemia, overactive bladder, spinal stenosis and UT  Family/Caregiver Present: No  Prior to Session Communication: Bedside nurse  Patient Position Received: Alarm on, Up in chair  Preferred Learning Style: auditory, kinesthetic, verbal, visual, written, Other:(comment) (pt did show levels of confusion)  General Comment: Patient pleasant, cooperative and agreeable to OT eval but did show minimum signs of  irritation when asked questions that the pt did know- showed signs of confusion- nursing staff has also observed  Precautions:  Medical Precautions: Fall precautions (1L O2, purewick, tele)     Date/Time Vitals Session Patient Position Pulse Resp SpO2 BP MAP (mmHg)    07/04/25 1200 --  --  66  15  --  --  --                 Pain:  Pain Assessment  Pain Assessment: 0-10  0-10 (Numeric) Pain Score: 0 - No pain    Objective   Cognition:  Overall Cognitive Status: Impaired  Orientation Level: Disoriented to situation, Disoriented to time (pt showed signs of confusion and would showing minor levels of irritation when cognition was being assessed and novel concepts where difficult for pt; pt didn't remember where to place hands when holding walker in standing position required cues and gest)  Processing Speed: Delayed           Home Living:  Type of Home: Assisted living  Lives With: Other (Comment) (staff onsite)  Home Adaptive Equipment: Walker rolling or standard (Rollator)  Home Layout: One level  Home Access: Level entry  Bathroom Shower/Tub: Walk-in shower  Bathroom Toilet: Handicapped height  Bathroom Equipment: Grab bars in shower, Shower chair with back  Home Living Comments: pt reported being I with ADLs but had a person in the room during bathing  Prior Function:  Level of [place]: Independent with ADLs and functional transfers, Needs assistance with homemaking  Receives Help From: Family  ADL Assistance: Independent (Patient reports staff is in her apartment when she showers)  Homemaking Assistance: Needs assistance (Facility staff manages; pt goes to dining room to eat meals)  Ambulatory Assistance: Independent (Rollator)  IADL History:  Homemaking Responsibilities: No  Current License: No  ADL:  Eating Assistance: Independent  Eating Deficit: Setup  Grooming Assistance: Stand by  Grooming Deficit: Supervision/safety, Increased time to complete, Verbal cueing  Bathing Assistance: Moderate  Bathing  Deficit: Right arm, Left arm, Abdomen, Verbal cueing, Supervision/safety, Increased time to complete   UE Dressing Assistance: Minimal  UE Dressing Deficit: Pull down in back  LE Dressing Assistance: Moderate  LE Dressing Deficit: Increased time to complete, Supervision/safety, Steadying, Thread RLE into pants, Thread LLE into pants, Thread RLE into underwear, Thread LLE into underwear, Pull up over hips  Toileting Assistance with Device: Moderate  Toileting Deficit: Increased time to complete, Steadying, Supervison/safety, Verbal cueing, Grab bar use, Clothing management up, Clothing management down, Perineal hygiene (pt showed difficulties with hand placement with FWW)  Functional Assistance: Moderate  Functional Deficit: Increased time to complete, Verbal cueing, Supervision/safety, Shower transfer, Toilet transfer  ADL Comments: pt showed signs of confusion which could cause increase of assistance level for functional t/f pt was observed trying hold on to the under bar of the walker instead of the proper hand placement on FWW  Activity Tolerance:  Endurance: Tolerates 10 - 20 min exercise with multiple rests  Bed Mobility/Transfers: Bed Mobility  Bed Mobility: No    Transfers  Transfer: Yes  Transfer 1  Transfer From 1: Sit to, Stand to  Transfer to 1: Sit, Stand  Technique 1: Sit to stand, Stand to sit  Transfer Device 1: Walker  Transfer Level of Assistance 1: Moderate assistance, Moderate verbal cues, Maximum verbal cues, Minimal tactile cues  Trials/Comments 1: pt was confused and mod assistance could have been secondary to confusion      Functional Mobility:  Functional Mobility  Functional Mobility Performed: Yes  Functional Mobility 1  Surface 1: Level tile  Device 1: Rolling walker (FWW)  Assistance 1: Contact guard  Comments 1: about 4 steps forward and backwards  Standing Balance:  Static Standing Balance  Static Standing-Balance Support: Bilateral upper extremity supported  Static Standing-Level of  Assistance: Close supervision  Static Standing-Comment/Number of Minutes: with FWW     Sensation:  Light Touch: No apparent deficits  Strength:  Strength Comments: 3+/5 BUE  Perception:     Coordination:  Movements are Fluid and Coordinated: Yes   Hand Function:  Gross Grasp: Functional  Coordination:  (pt was confused and didn't touch each finger to thumb)    Outcome Measures:[facility] Daily Activity  Putting on and taking off regular lower body clothing: A lot  Bathing (including washing, rinsing, drying): A lot  Putting on and taking off regular upper body clothing: A little  Toileting, which includes using toilet, bedpan or urinal: A lot  Taking care of personal grooming such as brushing teeth: A little  Eating Meals: None  Daily Activity - Total Score: 16        Education Documentation  Body Mechanics, taught by Christelle Crane OT at 7/4/2025 12:04 PM.  Learner: Patient  Readiness: Eager  Method: Explanation, Demonstration  Response: Needs Reinforcement  Comment: proper hand placement and sequencing with body mechanics during functional t/f with FWW    ADL Training, taught by Christelle Crane OT at 7/4/2025 12:04 PM.  Learner: Patient  Readiness: Eager  Method: Explanation, Demonstration  Response: Needs Reinforcement  Comment: proper hand placement and sequencing with body mechanics during functional t/f with FWW    Education Comments  No comments found.        OP EDUCATION:       Goals:  Encounter Problems       Encounter Problems (Active)       OT Goals       Pt will complete functional t/f with <1 verbal cue distant supervision on safety and following precaution to increase safety and decrease fall risks during functional activities with LRD during functional mobility.        Start:  07/04/25    Expected End:  07/18/25            Pt will demo improved activity tolerance evidenced by participation in BADL and/or functional mobility x15 mins with </=1 rest break.        Start:  07/04/25    Expected  End:  07/18/25            Pt will complete all ADLs (I.e., bathing, toileting, hygiene,  and LE dressing) with one vc cue and stand by assist for safety while demonstrating adequate endurance and strength without showing signs of moderate pain, SOB, and/or fatigue.        Start:  07/04/25    Expected End:  07/18/25            Pt will complete BUE exercises, 10-15 reps 1-2 sets in all functional planes, to demo improved functional strength for increased participation in BADL and mobility.        Start:  07/04/25    Expected End:  07/18/25

## 2025-07-04 NOTE — PROGRESS NOTES
Vanessa Adame is a 89 y.o. female on day 2 of admission presenting with GI bleed.    Subjective     Patient asleep in bed with son at bedside. She is doing ok and denies any abdominal pain    Objective     Physical Exam  Constitutional:       Appearance: She is ill-appearing. She is not toxic-appearing.   HENT:      Head: Normocephalic and atraumatic.      Comments: Nodule on anterior forehead     Mouth/Throat:      Mouth: Mucous membranes are dry.   Eyes:      Conjunctiva/sclera: Conjunctivae normal.   Cardiovascular:      Rate and Rhythm: Normal rate and regular rhythm.      Heart sounds: No murmur heard.  Pulmonary:      Effort: Pulmonary effort is normal. No respiratory distress.   Abdominal:      General: There is no distension.      Palpations: Abdomen is soft.   Musculoskeletal:         General: Tenderness present.      Right lower leg: Tenderness present. 1+ Edema present.      Left lower leg: Tenderness present. 1+ Edema present.   Skin:     Coloration: Skin is pale.   Neurological:      Mental Status: Mental status is at baseline.   Psychiatric:         Mood and Affect: Mood normal.         Last Recorded Vitals  Blood pressure (!) 116/46, pulse 57, temperature 36.2 °C (97.2 °F), temperature source Temporal, resp. rate 16, height (!) 1.524 m (5'), weight 112 kg (246 lb), SpO2 97%.  Intake/Output last 3 Shifts:  I/O last 3 completed shifts:  In: 661.7 (5.9 mL/kg) [I.V.:300 (2.7 mL/kg); Blood:151.7; IV Piggyback:210]  Out: 790 (7.1 mL/kg) [Urine:790 (0.2 mL/kg/hr)]  Weight: 111.6 kg     Relevant Results  Scheduled medications  Scheduled Medications[1]  Continuous medications  Continuous Medications[2]  PRN medications  PRN Medications[3]    Results for orders placed or performed during the hospital encounter of 07/02/25 (from the past 24 hours)   Transthoracic Echo Complete   Result Value Ref Range    AV pk akilah 1.23 m/s    AV mn grad 4 mmHg    LVOT diam 2.27 cm    MV E/A ratio 3.35     LA vol index A/L  12.9 ml/m2    Tricuspid annular plane systolic excursion 1.8 cm    LV EF 38 %    RV free wall pk S' 11.00 cm/s    LVIDd 5.79 cm    RVSP 33 mmHg    Aortic Valve Area by Continuity of VTI 2.44 cm2    Aortic Valve Area by Continuity of Peak Velocity 2.26 cm2    AV pk grad 6 mmHg    LV A4C EF 38.2    Haptoglobin   Result Value Ref Range    Haptoglobin 68 30 - 200 mg/dL   CBC   Result Value Ref Range    WBC 7.8 4.4 - 11.3 x10*3/uL    nRBC 0.0 0.0 - 0.0 /100 WBCs    RBC 4.00 4.00 - 5.20 x10*6/uL    Hemoglobin 9.0 (L) 12.0 - 16.0 g/dL    Hematocrit 31.2 (L) 36.0 - 46.0 %    MCV 78 (L) 80 - 100 fL    MCH 22.5 (L) 26.0 - 34.0 pg    MCHC 28.8 (L) 32.0 - 36.0 g/dL    RDW 21.2 (H) 11.5 - 14.5 %    Platelets 318 150 - 450 x10*3/uL   POCT GLUCOSE   Result Value Ref Range    POCT Glucose 186 (H) 74 - 99 mg/dL   CBC   Result Value Ref Range    WBC 8.9 4.4 - 11.3 x10*3/uL    nRBC 0.0 0.0 - 0.0 /100 WBCs    RBC 3.73 (L) 4.00 - 5.20 x10*6/uL    Hemoglobin 8.2 (L) 12.0 - 16.0 g/dL    Hematocrit 27.6 (L) 36.0 - 46.0 %    MCV 74 (L) 80 - 100 fL    MCH 22.0 (L) 26.0 - 34.0 pg    MCHC 29.7 (L) 32.0 - 36.0 g/dL    RDW 21.0 (H) 11.5 - 14.5 %    Platelets 297 150 - 450 x10*3/uL   Comprehensive Metabolic Panel   Result Value Ref Range    Glucose 130 (H) 74 - 99 mg/dL    Sodium 138 136 - 145 mmol/L    Potassium 3.7 3.5 - 5.3 mmol/L    Chloride 100 98 - 107 mmol/L    Bicarbonate 31 21 - 32 mmol/L    Anion Gap 11 10 - 20 mmol/L    Urea Nitrogen 9 6 - 23 mg/dL    Creatinine 0.88 0.50 - 1.05 mg/dL    eGFR 63 >60 mL/min/1.73m*2    Calcium 9.0 8.6 - 10.3 mg/dL    Albumin 3.2 (L) 3.4 - 5.0 g/dL    Alkaline Phosphatase 69 33 - 136 U/L    Total Protein 5.9 (L) 6.4 - 8.2 g/dL    AST 21 9 - 39 U/L    Bilirubin, Total 2.1 (H) 0.0 - 1.2 mg/dL    ALT 9 7 - 45 U/L   POCT GLUCOSE   Result Value Ref Range    POCT Glucose 128 (H) 74 - 99 mg/dL      Transthoracic Echo Complete  Result Date: 7/3/2025   [facility], [address] [place], [place]  [address]               Tel [phone] and Fax [phone] TRANSTHORACIC ECHOCARDIOGRAM REPORT  Patient Name:       BENJI CALLEJAS           Jean Physician:    [id number] Demarcus Perdomo MD Study Date:         7/3/2025            Ordering Provider:    [id number] HENRY CARNES MRN/PID:            [id number]            Fellow: Accession#:         [id number]        Nurse: Date of Birth/Age:  1935 / 89      Sonographer:          Sameera mcbride RDCS Gender assigned at  F                   Additional Staff: Birth: Height:                                 Admit Date:           7/1/2025 Weight:             111.58 kg           Admission Status:     Inpatient -                                                               Routine BSA / BMI:          2.32 m2 / kg/m2     Encounter#:           [id number] Blood Pressure:     124/51 mmHg         Department Location:  [facility] Non                                                               Invasive Study Type:    TRANSTHORACIC ECHO (TTE) COMPLETE Diagnosis/ICD: Localized edema-R60.0 Indication:    Edema CPT Code:      Echo Complete w Full [id number] Patient History: Pertinent History: Previous DVT, CHF, Hyperlipidemia, HTN and DM, CKD. Study Detail: The following Echo studies were performed: 2D, M-Mode, Doppler and               color flow. Image quality for this study is poor. Technically               challenging study due to Pt moving throughout exam. Unable to               obtain suprasternal notch and IVC view.  PHYSICIAN INTERPRETATION: Left Ventricle: Left ventricular ejection fraction is moderately decreased by visual estimate at 35-40%. There is mild eccentric left ventricular hypertrophy. There is global hypokinesis of the left ventricle with minor regional variations. The left  ventricular cavity size is mildly dilated. There is mildly increased septal and normal posterior left ventricular wall thickness. Abnormal (paradoxical) septal motion, consistent with left bundle branch block. Left ventricular diastolic filling cannot be determined due to left bundle branch block. Left Atrium: The left atrial size was not well visualized. Right Ventricle: The right ventricle is normal in size. There is normal right ventricular global systolic function. Right Atrium: The right atrium is normal in size. Aortic Valve: The aortic valve is probably trileaflet. The aortic valve area by VTI is 2.44 cmï¿½ with a peak velocity of 1.23 m/s. The peak and mean gradients are 5 mmHg and 4 mmHg, respectively with a dimensionless index of 0.60. There is mild to moderate aortic valve cusp calcification. There is trace to mild aortic valve regurgitation. Mitral Valve: The mitral valve is mildly thickened. The doppler estimated peak and mean diastolic pressure gradients are 5.4 mmHg and 2 mmHg, respectively. There is mild mitral annular calcification. The mean gradient of the mitral valve is 2 mmHg. There is mild mitral valve regurgitation. The E Vmax is 1.00 m/s. Tricuspid Valve: The tricuspid valve is structurally normal. There is trace to mild tricuspid regurgitation. The Doppler estimated right ventricular systolic pressure (RVSP) is slightly elevated at 33 mmHg. Pulmonic Valve: The pulmonic valve is not well visualized. There is trace pulmonic valve regurgitation. Pericardium: Trivial pericardial effusion. Aorta: The aortic root is normal. Systemic Veins: The inferior vena cava was not well visualized, IVC inspiratory collapse is not well visualized.  CONCLUSIONS:  1. Left ventricular ejection fraction is moderately decreased by visual estimate at 35-40%.  2. There is global hypokinesis of the left ventricle with minor regional variations.  3. Left ventricular cavity size is mildly dilated.  4. Abnormal septal  motion consistent with left bundle branch block.  5. There is normal right ventricular global systolic function.  6. The Doppler estimated RVSP is slightly elevated at 33 mmHg. QUANTITATIVE DATA SUMMARY:  2D MEASUREMENTS:          Normal Ranges: IVSd:            1.17 cm  (0.6-1.1cm) LVPWd:           0.76 cm  (0.6-1.1cm) LVIDd:           5.79 cm  (3.9-5.9cm) LVIDs:           4.39 cm LV Mass Index:   96 g/m2 LVEDV Index:     44 ml/m2 LV % FS          24.2 %  LEFT ATRIUM:                  Normal Ranges: LA Vol A4C:        48.5 ml    (22+/-6mL/m2) LA Vol A2C:        16.9 ml LA Vol BP:         29.9 ml LA Vol Index A4C:  20.9 ml/m2 LA Vol Index A2C:  7.3 ml/m2 LA Vol Index BP:   12.9 ml/m2 LA Area A4C:       18.2 cm2 LA Area A2C:       10.3 cm2 LA Major Axis A4C: 5.8 cm LA Major Axis A2C: 5.3 cm LA Vol A4C:        44.9 ml LA Vol A2C:        57.2 ml LA Vol Index BSA:  22.0 ml/m2  RIGHT ATRIUM:          Normal Ranges: RA Area A4C:  10.2 cm2  AORTA MEASUREMENTS:         Normal Ranges: Ao Sinus, d:        2.90 cm (2.1-3.5cm) Asc Ao, d:          3.40 cm (2.1-3.4cm)  LV SYSTOLIC FUNCTION:                      Normal Ranges: EF-A4C View:    38 % (>=55%) EF-A2C View:    43 % EF-Biplane:     42 % EF-Visual:      38 % LV EF Reported: 38 %  LV DIASTOLIC FUNCTION:          Normal Ranges: MV Peak E:             1.00 m/s (0.7-1.2 m/s) MV Peak A:             0.30 m/s (0.42-0.7 m/s) E/A Ratio:             3.35     (1.0-2.2)  MITRAL VALVE:          Normal Ranges: MV Vmax:      1.16 m/s (<=1.3m/s) MV peak P.4 mmHg (<5mmHg) MV mean P.4 mmHg (<2mmHg) MV VTI:       27.39 cm (10-13cm) MV DT:        131 msec (150-240msec)  AORTIC VALVE:                     Normal Ranges: AoV Vmax:                1.23 m/s (<=1.7m/s) AoV Peak P.0 mmHg (<20mmHg) AoV Mean PG:             3.7 mmHg (1.7-11.5mmHg) LVOT Max Cordell:            0.69 m/s (<=1.1m/s) AoV VTI:                 27.62 cm (18-25cm) LVOT VTI:                16.67 cm LVOT  Diameter:           2.27 cm  (1.8-2.4cm) AoV Area, VTI:           2.44 cm2 (2.5-5.5cm2) AoV Area,Vmax:           2.26 cm2 (2.5-4.5cm2) AoV Dimensionless Index: 0.60  RIGHT VENTRICLE: RV Basal 3.00 cm RV Mid   1.80 cm RV Major 7.0 cm TAPSE:   18.0 mm RV s'    0.11 m/s  TRICUSPID VALVE/RVSP:          Normal Ranges: Peak TR Velocity:     2.74 m/s Est. RA Pressure:     3 RV Syst Pressure:     33       (< 30mmHg)  PULMONIC VALVE:          Normal Ranges: PV Max Cordell:     0.9 m/s  (0.6-0.9m/s) PV Max PG:      3.0 mmHg  AORTA: Asc Ao Diam 3.40 cm  [id number] Demarcus Perdomo MD Electronically signed on 7/3/2025 at 2:37:34 PM  ** Final **     Esophagogastroduodenoscopy (EGD)  Result Date: 7/3/2025  Table formatting from the original result was not included. Impression Ring in the GE junction Mild erythematous mucosa in the antrum The duodenal bulb, 1st part of the duodenum and 2nd part of the duodenum appeared normal.     No old/fresh bleeding/ulcer after D2 Findings Regular Z-line 36 cm from the incisors. UES felt to be tight Non-obstructing ring in the GE junction Mild, localized erythematous mucosa in the antrum The duodenal bulb, 1st part of the duodenum and 2nd part of the duodenum appeared normal. Recommendation Monitor CBC. Restart Coumadin depending on hemoglobin trend  Indication Acute blood loss anemia, GI bleed severe anemia, supratherapeutic INR, FOBT positive.  Colonoscopy in 2020.  No previous EGD. Staff Staff Role Olga Quintana MD Proceduralist Medications See Anesthesia Record. Preprocedure A history and physical has been performed, and patient medication allergies have been reviewed. The patient's tolerance of previous anesthesia has been reviewed. The risks and benefits of the procedure and the sedation options and risks were discussed with the patient. All questions were answered and informed consent obtained. Details of the Procedure The patient underwent monitored anesthesia care, which was administered by  an anesthesia professional. The patient's blood pressure, ECG, ETCO2, heart rate, level of consciousness, oxygen and respirations were monitored throughout the procedure. The scope was introduced through the mouth and advanced to the second part of the duodenum. Retroflexion was performed in the cardia. Prior to the procedure, the patient's H. Pylori status was unknown. The patient experienced no blood loss. The procedure was not difficult. The patient tolerated the procedure well. There were no apparent adverse events. Events Procedure Events Event Event Time ENDO SCOPE IN TIME 7/3/2025 12:00 PM ENDO SCOPE OUT TIME 7/3/2025 12:04 PM Specimens No specimens collected Procedure Location [place] [address] [place] [address] [phone] Referring Provider Olga Quintana MD Procedure Provider Olga Quintana MD          Assessment & Plan  GI bleed    Acute blood loss anemia    Coagulopathy (Multi)    Bilateral leg edema    Vanessa Adame is a 89 y.o. female with a past medical history of DVT on Coumadin, CHF, CKD, dementia, diabetes mellitus type 2,  overactive bladder, spinal stenosis and UTI who initially presented with weakness  admitted for an acutely worsening anemia     #acutely worsening anemia in the setting of supratherapeutic INR   CT abdomen and pelvis without evidence of acute diverticulitis   -EGD 7/3 unremarkable   -hematologic work-up unremarkable   -INR improved 3.8-->2.2  -s/p total RBC transfusion x 4 units; s/p vitamin K 5 mg and FFP x 1  -Transfuse if Hg  <7  -protonix 40 mg IV BID   -would recommend against restarting warfarin at this time given likely provoked DVT in the setting of a prolonged hospital stay and ultrasound with confirmation of resolved clot. Patient was therapeutically treated with warfarin x 6 months and has a high likelihood of a recurring clot   -Refer hematology outpatient   -PT/OT eval      # LE  Edema/pain  -US of lower extremity negative for DVT in R leg  -patient refused LLE due to pain  -BNP elevated; ECHO with EF 35-40%  -weaned to baseline 2 L overnight   -home lasix 40 mg daily  -multimodal pain regimen      Hx of DVT  -diagnosed during admission in 2/25 for diverticulitis  -uncertain if provoked or unprovoked      Hypotension  -resolved  -holding enalapril 20 mg  -home lasix 40 mg daily   -home metoprolol 25 mg BID      MARIA ALEJANDRA v nocturnal hypoxia   -on 2 L NC overnight      cystitis  -holding Macrobid   -urine culture negative      Hypokalemia  -resolved  -Replete PRN      Dispo: patient requires a higher level of care due to risk of acute decompensation due to anemia      Tim Saenz,   PGY-3  Family Medicine            [1] cetirizine, 10 mg, oral, Daily  [Held by provider] enalapril, 20 mg, oral, Daily  [Held by provider] furosemide, 40 mg, oral, Daily  metoprolol tartrate, 25 mg, oral, BID  oxyBUTYnin, 5 mg, oral, BID  pantoprazole, 40 mg, intravenous, BID  polyethylene glycol, 17 g, oral, Daily  pyridoxine, 50 mg, oral, BID  [2]    [3] PRN medications: acetaminophen **OR** acetaminophen **OR** acetaminophen, calcium carbonate, dextrose, dextrose, fluticasone, glucagon, glucagon, melatonin, ondansetron **OR** ondansetron, oxyCODONE, oxygen, polyethylene glycol, traMADol

## 2025-07-05 LAB
ALBUMIN SERPL BCP-MCNC: 3.2 G/DL (ref 3.4–5)
ALP SERPL-CCNC: 72 U/L (ref 33–136)
ALT SERPL W P-5'-P-CCNC: 9 U/L (ref 7–45)
ANION GAP SERPL CALC-SCNC: 11 MMOL/L (ref 10–20)
AST SERPL W P-5'-P-CCNC: 14 U/L (ref 9–39)
BILIRUB SERPL-MCNC: 1.6 MG/DL (ref 0–1.2)
BUN SERPL-MCNC: 11 MG/DL (ref 6–23)
CALCIUM SERPL-MCNC: 9.2 MG/DL (ref 8.6–10.3)
CHLORIDE SERPL-SCNC: 100 MMOL/L (ref 98–107)
CO2 SERPL-SCNC: 30 MMOL/L (ref 21–32)
CREAT SERPL-MCNC: 0.86 MG/DL (ref 0.5–1.05)
EGFRCR SERPLBLD CKD-EPI 2021: 65 ML/MIN/1.73M*2
ERYTHROCYTE [DISTWIDTH] IN BLOOD BY AUTOMATED COUNT: 22.9 % (ref 11.5–14.5)
GLUCOSE SERPL-MCNC: 112 MG/DL (ref 74–99)
HCT VFR BLD AUTO: 29.3 % (ref 36–46)
HGB BLD-MCNC: 8.5 G/DL (ref 12–16)
INR PPP: 1.3 (ref 0.9–1.1)
MCH RBC QN AUTO: 22.7 PG (ref 26–34)
MCHC RBC AUTO-ENTMCNC: 29 G/DL (ref 32–36)
MCV RBC AUTO: 78 FL (ref 80–100)
NRBC BLD-RTO: 0 /100 WBCS (ref 0–0)
PLATELET # BLD AUTO: 281 X10*3/UL (ref 150–450)
POTASSIUM SERPL-SCNC: 3.1 MMOL/L (ref 3.5–5.3)
PROT SERPL-MCNC: 5.8 G/DL (ref 6.4–8.2)
PROTHROMBIN TIME: 14.8 SECONDS (ref 9.8–12.4)
RBC # BLD AUTO: 3.74 X10*6/UL (ref 4–5.2)
SODIUM SERPL-SCNC: 138 MMOL/L (ref 136–145)
WBC # BLD AUTO: 7.2 X10*3/UL (ref 4.4–11.3)

## 2025-07-05 PROCEDURE — 80053 COMPREHEN METABOLIC PANEL: CPT | Performed by: FAMILY MEDICINE

## 2025-07-05 PROCEDURE — 99232 SBSQ HOSP IP/OBS MODERATE 35: CPT | Performed by: FAMILY MEDICINE

## 2025-07-05 PROCEDURE — 2500000001 HC RX 250 WO HCPCS SELF ADMINISTERED DRUGS (ALT 637 FOR MEDICARE OP): Performed by: FAMILY MEDICINE

## 2025-07-05 PROCEDURE — 2500000004 HC RX 250 GENERAL PHARMACY W/ HCPCS (ALT 636 FOR OP/ED)

## 2025-07-05 PROCEDURE — 2060000001 HC INTERMEDIATE ICU ROOM DAILY

## 2025-07-05 PROCEDURE — 85027 COMPLETE CBC AUTOMATED: CPT | Performed by: FAMILY MEDICINE

## 2025-07-05 PROCEDURE — 85610 PROTHROMBIN TIME: CPT | Performed by: FAMILY MEDICINE

## 2025-07-05 PROCEDURE — 2500000001 HC RX 250 WO HCPCS SELF ADMINISTERED DRUGS (ALT 637 FOR MEDICARE OP)

## 2025-07-05 PROCEDURE — 2500000002 HC RX 250 W HCPCS SELF ADMINISTERED DRUGS (ALT 637 FOR MEDICARE OP, ALT 636 FOR OP/ED): Performed by: FAMILY MEDICINE

## 2025-07-05 PROCEDURE — 36415 COLL VENOUS BLD VENIPUNCTURE: CPT | Performed by: FAMILY MEDICINE

## 2025-07-05 PROCEDURE — 2500000004 HC RX 250 GENERAL PHARMACY W/ HCPCS (ALT 636 FOR OP/ED): Performed by: INTERNAL MEDICINE

## 2025-07-05 RX ORDER — ENOXAPARIN SODIUM 100 MG/ML
40 INJECTION SUBCUTANEOUS DAILY
Status: DISPENSED | OUTPATIENT
Start: 2025-07-06

## 2025-07-05 RX ORDER — ENOXAPARIN SODIUM 100 MG/ML
40 INJECTION SUBCUTANEOUS DAILY
Status: DISCONTINUED | OUTPATIENT
Start: 2025-07-05 | End: 2025-07-05

## 2025-07-05 RX ORDER — POTASSIUM CHLORIDE 20 MEQ/1
40 TABLET, EXTENDED RELEASE ORAL ONCE
Status: COMPLETED | OUTPATIENT
Start: 2025-07-05 | End: 2025-07-05

## 2025-07-05 RX ADMIN — OXYBUTYNIN CHLORIDE 5 MG: 5 TABLET ORAL at 20:31

## 2025-07-05 RX ADMIN — OXYBUTYNIN CHLORIDE 5 MG: 5 TABLET ORAL at 09:33

## 2025-07-05 RX ADMIN — METOPROLOL TARTRATE 25 MG: 25 TABLET, FILM COATED ORAL at 20:31

## 2025-07-05 RX ADMIN — PYRIDOXINE HCL TAB 50 MG 50 MG: 50 TAB at 20:31

## 2025-07-05 RX ADMIN — PYRIDOXINE HCL TAB 50 MG 50 MG: 50 TAB at 09:33

## 2025-07-05 RX ADMIN — CETIRIZINE HYDROCHLORIDE 10 MG: 10 TABLET, FILM COATED ORAL at 09:33

## 2025-07-05 RX ADMIN — PANTOPRAZOLE SODIUM 40 MG: 40 INJECTION, POWDER, FOR SOLUTION INTRAVENOUS at 09:33

## 2025-07-05 RX ADMIN — POTASSIUM CHLORIDE 40 MEQ: 1500 TABLET, EXTENDED RELEASE ORAL at 10:10

## 2025-07-05 RX ADMIN — PANTOPRAZOLE SODIUM 40 MG: 40 INJECTION, POWDER, FOR SOLUTION INTRAVENOUS at 20:31

## 2025-07-05 RX ADMIN — FUROSEMIDE 40 MG: 40 TABLET ORAL at 09:33

## 2025-07-05 RX ADMIN — METOPROLOL TARTRATE 25 MG: 25 TABLET, FILM COATED ORAL at 09:33

## 2025-07-05 RX ADMIN — POLYETHYLENE GLYCOL 3350 17 G: 17 POWDER, FOR SOLUTION ORAL at 09:33

## 2025-07-05 ASSESSMENT — COGNITIVE AND FUNCTIONAL STATUS - GENERAL
MOBILITY SCORE: 14
WALKING IN HOSPITAL ROOM: A LOT
MOVING FROM LYING ON BACK TO SITTING ON SIDE OF FLAT BED WITH BEDRAILS: A LITTLE
HELP NEEDED FOR BATHING: A LOT
DRESSING REGULAR UPPER BODY CLOTHING: A LOT
STANDING UP FROM CHAIR USING ARMS: A LOT
PERSONAL GROOMING: A LOT
WALKING IN HOSPITAL ROOM: A LOT
MOVING TO AND FROM BED TO CHAIR: A LOT
TURNING FROM BACK TO SIDE WHILE IN FLAT BAD: A LITTLE
TURNING FROM BACK TO SIDE WHILE IN FLAT BAD: A LITTLE
DRESSING REGULAR UPPER BODY CLOTHING: A LOT
DAILY ACTIVITIY SCORE: 14
CLIMB 3 TO 5 STEPS WITH RAILING: A LOT
DAILY ACTIVITIY SCORE: 14
HELP NEEDED FOR BATHING: A LOT
DRESSING REGULAR LOWER BODY CLOTHING: A LOT
CLIMB 3 TO 5 STEPS WITH RAILING: A LOT
PERSONAL GROOMING: A LOT
MOBILITY SCORE: 14
MOVING FROM LYING ON BACK TO SITTING ON SIDE OF FLAT BED WITH BEDRAILS: A LITTLE
MOVING TO AND FROM BED TO CHAIR: A LOT
TOILETING: A LOT
DRESSING REGULAR LOWER BODY CLOTHING: A LOT
TOILETING: A LOT
STANDING UP FROM CHAIR USING ARMS: A LOT

## 2025-07-05 ASSESSMENT — PAIN - FUNCTIONAL ASSESSMENT: PAIN_FUNCTIONAL_ASSESSMENT: 0-10

## 2025-07-05 ASSESSMENT — PAIN SCALES - GENERAL
PAINLEVEL_OUTOF10: 0 - NO PAIN
PAINLEVEL_OUTOF10: 0 - NO PAIN

## 2025-07-05 NOTE — PROGRESS NOTES
07/05/25 1414   Discharge Planning   Living Arrangements Other (Comment)  ([facility] at the [facility] in [place])   Support Systems Children   Assistance Needed Per facility baseline is A&0x3 confused at times, ambulates with rollator/ walker, doesn't drive, room air, HX DVT on coumadin   Type of Residence Assisted living   Do you have animals or pets at home? No   Care Facility Name [facility]   Who is requesting discharge planning? Provider   Expected Discharge Disposition SNF  (PT/OT rec MOD- [facility] left for [place]  requesting info on ability to return. Alternatively son was agreeable to [facility] [place] [facility] for SNF if unable to return to [place].)   Stroke Family Assessment   Stroke Family Assessment Needed No   Intensity of Service   Intensity of Service 0-30 min

## 2025-07-05 NOTE — PROGRESS NOTES
Vanessa Adame is a 89 y.o. female on day 3 of admission presenting with GI bleed.      Subjective   Patient resting in bed, denies any current complaints but eager for discharge.    Objective     Last Recorded Vitals  /58 (BP Location: Right arm, Patient Position: Lying)   Pulse 67   Temp 36.3 °C (97.3 °F) (Temporal)   Resp 20   Wt 112 kg (246 lb)   SpO2 98%   Intake/Output last 3 Shifts:    Intake/Output Summary (Last 24 hours) at 7/5/2025 1905  Last data filed at 7/5/2025 1700  Gross per 24 hour   Intake 440 ml   Output 475 ml   Net -35 ml       Admission Weight  Weight: 77.1 kg (170 lb) (07/02/25 0607)    Daily Weight  07/03/25 : 112 kg (246 lb)      Physical Exam  Constitutional: alert and oriented x 3, awake, cooperative, no acute distress  Skin: warm and dry, pale  Head/Neck: Normocephalic, atraumatic  Eyes: clear sclera  ENMT: mucous membranes moist  Cardio: Regular rate and rhythm  Resp: CTA bilaterally, good respiratory effort  Gastrointestinal: Soft, nontender, nondistended  Musculoskeletal: generalized weakness  Neuro: alert and oriented x 3  Psychological: Appropriate mood and behavior    Relevant Results  Scheduled medications  Scheduled Medications[1]  Continuous medications  Continuous Medications[2]  PRN medications  PRN Medications[3]    Results for orders placed or performed during the hospital encounter of 07/02/25 (from the past 24 hours)   POCT GLUCOSE   Result Value Ref Range    POCT Glucose 147 (H) 74 - 99 mg/dL   CBC   Result Value Ref Range    WBC 7.2 4.4 - 11.3 x10*3/uL    nRBC 0.0 0.0 - 0.0 /100 WBCs    RBC 3.74 (L) 4.00 - 5.20 x10*6/uL    Hemoglobin 8.5 (L) 12.0 - 16.0 g/dL    Hematocrit 29.3 (L) 36.0 - 46.0 %    MCV 78 (L) 80 - 100 fL    MCH 22.7 (L) 26.0 - 34.0 pg    MCHC 29.0 (L) 32.0 - 36.0 g/dL    RDW 22.9 (H) 11.5 - 14.5 %    Platelets 281 150 - 450 x10*3/uL   Comprehensive Metabolic Panel   Result Value Ref Range    Glucose 112 (H) 74 - 99 mg/dL    Sodium 138 136  - 145 mmol/L    Potassium 3.1 (L) 3.5 - 5.3 mmol/L    Chloride 100 98 - 107 mmol/L    Bicarbonate 30 21 - 32 mmol/L    Anion Gap 11 10 - 20 mmol/L    Urea Nitrogen 11 6 - 23 mg/dL    Creatinine 0.86 0.50 - 1.05 mg/dL    eGFR 65 >60 mL/min/1.73m*2    Calcium 9.2 8.6 - 10.3 mg/dL    Albumin 3.2 (L) 3.4 - 5.0 g/dL    Alkaline Phosphatase 72 33 - 136 U/L    Total Protein 5.8 (L) 6.4 - 8.2 g/dL    AST 14 9 - 39 U/L    Bilirubin, Total 1.6 (H) 0.0 - 1.2 mg/dL    ALT 9 7 - 45 U/L   Protime-INR   Result Value Ref Range    Protime 14.8 (H) 9.8 - 12.4 seconds    INR 1.3 (H) 0.9 - 1.1       Assessment & Plan    89yo CF with PMH of diastolic HF, CKD, dementia, DM-type II, overactive bladder, spinal stenosis, and recent DVT on warfarin in Feb 2025 that presented to the ED with acute anemia.    Acute anemia, improved  - in setting of supratherapeutic INR without obvious signs of bleeding  - EGD on 7/3 was negative   - s/p 4u pRBC, 1u FFP, and 5mg vitamin K [id number]  - daily CBC to ensure stabilization    DVT on warfarin  - appears that patient was diagnosed with DVT while admitted in Feb 2025 for diverticulitis  - no signs of hypercoagulable state  - seems DVT is provoked in setting of immobility, and completed 5mths of treatment  - repeat ultrasound is negative now for thrombus  - discontinue warfarin    Hypertension  - was initially hypotensive in setting of above  - continue home lasix and metoprolol  - hold home enalapril as BP seems stable    DVT Proph: SCDs, lovenox    Dispo: Patient appears medically stable for discharge to SNF, patient prefers to return back to [place] but will need confirmation that they can accept otherwise agreeable to go to [facility].     Sushila Alegre DO           [1] cetirizine, 10 mg, oral, Daily  [Held by provider] enalapril, 20 mg, oral, Daily  furosemide, 40 mg, oral, Daily  metoprolol tartrate, 25 mg, oral, BID  oxyBUTYnin, 5 mg, oral, BID  pantoprazole, 40 mg, intravenous,  BID  polyethylene glycol, 17 g, oral, Daily  pyridoxine, 50 mg, oral, BID  [2]    [3] PRN medications: acetaminophen **OR** acetaminophen **OR** acetaminophen, calcium carbonate, dextrose, dextrose, fluticasone, glucagon, glucagon, melatonin, ondansetron **OR** ondansetron, oxyCODONE, oxygen, polyethylene glycol, traMADol

## 2025-07-06 LAB
ANION GAP SERPL CALC-SCNC: 11 MMOL/L (ref 10–20)
BUN SERPL-MCNC: 11 MG/DL (ref 6–23)
CALCIUM SERPL-MCNC: 9 MG/DL (ref 8.6–10.3)
CHLORIDE SERPL-SCNC: 100 MMOL/L (ref 98–107)
CO2 SERPL-SCNC: 29 MMOL/L (ref 21–32)
CREAT SERPL-MCNC: 0.75 MG/DL (ref 0.5–1.05)
EGFRCR SERPLBLD CKD-EPI 2021: 77 ML/MIN/1.73M*2
ERYTHROCYTE [DISTWIDTH] IN BLOOD BY AUTOMATED COUNT: 24.1 % (ref 11.5–14.5)
GLUCOSE SERPL-MCNC: 129 MG/DL (ref 74–99)
HCT VFR BLD AUTO: 29.7 % (ref 36–46)
HGB BLD-MCNC: 8.7 G/DL (ref 12–16)
MCH RBC QN AUTO: 22.9 PG (ref 26–34)
MCHC RBC AUTO-ENTMCNC: 29.3 G/DL (ref 32–36)
MCV RBC AUTO: 78 FL (ref 80–100)
NRBC BLD-RTO: 0 /100 WBCS (ref 0–0)
PLATELET # BLD AUTO: 294 X10*3/UL (ref 150–450)
POTASSIUM SERPL-SCNC: 3.1 MMOL/L (ref 3.5–5.3)
RBC # BLD AUTO: 3.8 X10*6/UL (ref 4–5.2)
SODIUM SERPL-SCNC: 137 MMOL/L (ref 136–145)
WBC # BLD AUTO: 7.4 X10*3/UL (ref 4.4–11.3)

## 2025-07-06 PROCEDURE — 80048 BASIC METABOLIC PNL TOTAL CA: CPT | Performed by: FAMILY MEDICINE

## 2025-07-06 PROCEDURE — 2500000005 HC RX 250 GENERAL PHARMACY W/O HCPCS: Performed by: INTERNAL MEDICINE

## 2025-07-06 PROCEDURE — 2060000001 HC INTERMEDIATE ICU ROOM DAILY

## 2025-07-06 PROCEDURE — 36415 COLL VENOUS BLD VENIPUNCTURE: CPT | Performed by: FAMILY MEDICINE

## 2025-07-06 PROCEDURE — 2500000001 HC RX 250 WO HCPCS SELF ADMINISTERED DRUGS (ALT 637 FOR MEDICARE OP)

## 2025-07-06 PROCEDURE — 2500000004 HC RX 250 GENERAL PHARMACY W/ HCPCS (ALT 636 FOR OP/ED)

## 2025-07-06 PROCEDURE — 2500000004 HC RX 250 GENERAL PHARMACY W/ HCPCS (ALT 636 FOR OP/ED): Performed by: INTERNAL MEDICINE

## 2025-07-06 PROCEDURE — 2500000004 HC RX 250 GENERAL PHARMACY W/ HCPCS (ALT 636 FOR OP/ED): Performed by: FAMILY MEDICINE

## 2025-07-06 PROCEDURE — 99232 SBSQ HOSP IP/OBS MODERATE 35: CPT | Performed by: FAMILY MEDICINE

## 2025-07-06 PROCEDURE — 2500000002 HC RX 250 W HCPCS SELF ADMINISTERED DRUGS (ALT 637 FOR MEDICARE OP, ALT 636 FOR OP/ED): Performed by: FAMILY MEDICINE

## 2025-07-06 PROCEDURE — 2500000001 HC RX 250 WO HCPCS SELF ADMINISTERED DRUGS (ALT 637 FOR MEDICARE OP): Performed by: FAMILY MEDICINE

## 2025-07-06 PROCEDURE — 85027 COMPLETE CBC AUTOMATED: CPT | Performed by: FAMILY MEDICINE

## 2025-07-06 RX ORDER — POTASSIUM CHLORIDE 20 MEQ/1
40 TABLET, EXTENDED RELEASE ORAL ONCE
Status: COMPLETED | OUTPATIENT
Start: 2025-07-06 | End: 2025-07-06

## 2025-07-06 RX ADMIN — OXYBUTYNIN CHLORIDE 5 MG: 5 TABLET ORAL at 08:43

## 2025-07-06 RX ADMIN — METOPROLOL TARTRATE 25 MG: 25 TABLET, FILM COATED ORAL at 20:38

## 2025-07-06 RX ADMIN — FUROSEMIDE 40 MG: 40 TABLET ORAL at 08:43

## 2025-07-06 RX ADMIN — POLYETHYLENE GLYCOL 3350 17 G: 17 POWDER, FOR SOLUTION ORAL at 08:43

## 2025-07-06 RX ADMIN — PANTOPRAZOLE SODIUM 40 MG: 40 INJECTION, POWDER, FOR SOLUTION INTRAVENOUS at 20:39

## 2025-07-06 RX ADMIN — METOPROLOL TARTRATE 25 MG: 25 TABLET, FILM COATED ORAL at 08:43

## 2025-07-06 RX ADMIN — ENOXAPARIN SODIUM 40 MG: 100 INJECTION SUBCUTANEOUS at 08:43

## 2025-07-06 RX ADMIN — PYRIDOXINE HCL TAB 50 MG 50 MG: 50 TAB at 20:39

## 2025-07-06 RX ADMIN — PANTOPRAZOLE SODIUM 40 MG: 40 INJECTION, POWDER, FOR SOLUTION INTRAVENOUS at 08:43

## 2025-07-06 RX ADMIN — POTASSIUM CHLORIDE 40 MEQ: 1500 TABLET, EXTENDED RELEASE ORAL at 08:56

## 2025-07-06 RX ADMIN — OXYBUTYNIN CHLORIDE 5 MG: 5 TABLET ORAL at 20:39

## 2025-07-06 RX ADMIN — PYRIDOXINE HCL TAB 50 MG 50 MG: 50 TAB at 08:43

## 2025-07-06 RX ADMIN — Medication 3 MG: at 20:38

## 2025-07-06 RX ADMIN — CETIRIZINE HYDROCHLORIDE 10 MG: 10 TABLET, FILM COATED ORAL at 08:43

## 2025-07-06 ASSESSMENT — COGNITIVE AND FUNCTIONAL STATUS - GENERAL
DAILY ACTIVITIY SCORE: 14
CLIMB 3 TO 5 STEPS WITH RAILING: A LOT
MOBILITY SCORE: 14
TOILETING: A LOT
HELP NEEDED FOR BATHING: A LOT
MOVING TO AND FROM BED TO CHAIR: A LOT
DRESSING REGULAR LOWER BODY CLOTHING: A LOT
WALKING IN HOSPITAL ROOM: A LOT
STANDING UP FROM CHAIR USING ARMS: A LOT
TURNING FROM BACK TO SIDE WHILE IN FLAT BAD: A LITTLE
PERSONAL GROOMING: A LOT
DRESSING REGULAR UPPER BODY CLOTHING: A LOT
MOVING FROM LYING ON BACK TO SITTING ON SIDE OF FLAT BED WITH BEDRAILS: A LITTLE

## 2025-07-06 ASSESSMENT — PAIN SCALES - GENERAL
PAINLEVEL_OUTOF10: 0 - NO PAIN
PAINLEVEL_OUTOF10: 0 - NO PAIN

## 2025-07-06 ASSESSMENT — PAIN - FUNCTIONAL ASSESSMENT: PAIN_FUNCTIONAL_ASSESSMENT: 0-10

## 2025-07-06 NOTE — CARE PLAN
The patient's goals for the shift include to get some rest for the night.     The clinical goals for the shift include pt will remain safe and HDS throughout the shift.

## 2025-07-06 NOTE — CARE PLAN
The patient's goals for the shift include to get some rest for the night.     The clinical goals for the shift include pt will remain free from falls through shift

## 2025-07-06 NOTE — PROGRESS NOTES
Vanessa Adame is a 89 y.o. female on day 4 of admission presenting with GI bleed.      Subjective   Patient resting in bed, denies any current complaints but eager for discharge.    Objective     Last Recorded Vitals  /54 (BP Location: Right arm, Patient Position: Lying)   Pulse 73   Temp 36.4 °C (97.6 °F) (Temporal)   Resp 17   Wt 112 kg (246 lb)   SpO2 98%   Intake/Output last 3 Shifts:    Intake/Output Summary (Last 24 hours) at 7/6/2025 0945  Last data filed at 7/6/2025 0824  Gross per 24 hour   Intake 510 ml   Output 425 ml   Net 85 ml       Admission Weight  Weight: 77.1 kg (170 lb) (07/02/25 0607)    Daily Weight  07/03/25 : 112 kg (246 lb)      Physical Exam  Constitutional: alert and oriented x 3, awake, cooperative, no acute distress  Skin: warm and dry, pale  Head/Neck: Normocephalic, atraumatic  Eyes: clear sclera  ENMT: mucous membranes moist  Cardio: Regular rate and rhythm  Resp: CTA bilaterally, good respiratory effort  Gastrointestinal: Soft, nontender, nondistended  Musculoskeletal: generalized weakness  Neuro: alert and oriented x 3  Psychological: Appropriate mood and behavior    Relevant Results  Scheduled medications  Scheduled Medications[1]  Continuous medications  Continuous Medications[2]  PRN medications  PRN Medications[3]    Results for orders placed or performed during the hospital encounter of 07/02/25 (from the past 24 hours)   Basic Metabolic Panel   Result Value Ref Range    Glucose 129 (H) 74 - 99 mg/dL    Sodium 137 136 - 145 mmol/L    Potassium 3.1 (L) 3.5 - 5.3 mmol/L    Chloride 100 98 - 107 mmol/L    Bicarbonate 29 21 - 32 mmol/L    Anion Gap 11 10 - 20 mmol/L    Urea Nitrogen 11 6 - 23 mg/dL    Creatinine 0.75 0.50 - 1.05 mg/dL    eGFR 77 >60 mL/min/1.73m*2    Calcium 9.0 8.6 - 10.3 mg/dL   CBC   Result Value Ref Range    WBC 7.4 4.4 - 11.3 x10*3/uL    nRBC 0.0 0.0 - 0.0 /100 WBCs    RBC 3.80 (L) 4.00 - 5.20 x10*6/uL    Hemoglobin 8.7 (L) 12.0 - 16.0 g/dL     Hematocrit 29.7 (L) 36.0 - 46.0 %    MCV 78 (L) 80 - 100 fL    MCH 22.9 (L) 26.0 - 34.0 pg    MCHC 29.3 (L) 32.0 - 36.0 g/dL    RDW 24.1 (H) 11.5 - 14.5 %    Platelets 294 150 - 450 x10*3/uL       Assessment & Plan    89yo CF with PMH of diastolic HF, CKD, dementia, DM-type II, overactive bladder, spinal stenosis, and recent DVT on warfarin in Feb 2025 that presented to the ED with acute anemia.    Acute anemia, improved  - in setting of supratherapeutic INR without obvious signs of bleeding  - EGD on 7/3 was negative   - s/p 4u pRBC, 1u FFP, and 5mg vitamin K [id number]  - daily CBC to ensure stabilization    DVT on warfarin  - appears that patient was diagnosed with DVT while admitted in Feb 2025 for diverticulitis  - no signs of hypercoagulable state  - seems DVT is provoked in setting of immobility, and completed 5mths of treatment  - repeat ultrasound is negative now for thrombus  - discontinue warfarin    Hypertension  - was initially hypotensive in setting of above  - continue home lasix and metoprolol  - hold home enalapril as BP seems stable    DVT Proph: SCDs, lovenox    Dispo: Patient appears medically stable for discharge to SNF, patient prefers to return back to [facility] but will need confirmation that they can accept otherwise agreeable to go to [facility].     Sushila Alegre DO             [1] cetirizine, 10 mg, oral, Daily  [Held by provider] enalapril, 20 mg, oral, Daily  enoxaparin, 40 mg, subcutaneous, Daily  furosemide, 40 mg, oral, Daily  metoprolol tartrate, 25 mg, oral, BID  oxyBUTYnin, 5 mg, oral, BID  pantoprazole, 40 mg, intravenous, BID  polyethylene glycol, 17 g, oral, Daily  pyridoxine, 50 mg, oral, BID     [2]    [3] PRN medications: acetaminophen **OR** acetaminophen **OR** acetaminophen, calcium carbonate, dextrose, dextrose, fluticasone, glucagon, glucagon, melatonin, ondansetron **OR** ondansetron, oxyCODONE, oxygen, polyethylene glycol, traMADol

## 2025-07-06 NOTE — CARE PLAN
The patient's goals for the shift include      The clinical goals for the shift include Patient willremain HDS throughout shift.    Over the shift, the patient did not make progress toward the following goals. Barriers to progression include . Recommendations to address these barriers include .

## 2025-07-06 NOTE — PROGRESS NOTES
07/06/25 1157   Discharge Planning   Expected Discharge Disposition Home  (message left for  at [facility] (Asiya- [phone]) to determine ability to return back to [facility] vs new SNF. Callback number left for [facility] Supervisor Beverly since this [facility] will be unavailable 7/7)

## 2025-07-07 ENCOUNTER — DOCUMENTATION (OUTPATIENT)
Dept: GASTROENTEROLOGY | Facility: HOSPITAL | Age: 89
End: 2025-07-07
Payer: MEDICARE

## 2025-07-07 VITALS
HEART RATE: 73 BPM | DIASTOLIC BLOOD PRESSURE: 57 MMHG | WEIGHT: 246 LBS | OXYGEN SATURATION: 96 % | BODY MASS INDEX: 48.29 KG/M2 | SYSTOLIC BLOOD PRESSURE: 129 MMHG | TEMPERATURE: 97.8 F | RESPIRATION RATE: 21 BRPM | HEIGHT: 60 IN

## 2025-07-07 LAB
ANION GAP SERPL CALC-SCNC: 11 MMOL/L (ref 10–20)
BUN SERPL-MCNC: 9 MG/DL (ref 6–23)
CALCIUM SERPL-MCNC: 9.2 MG/DL (ref 8.6–10.3)
CHLORIDE SERPL-SCNC: 102 MMOL/L (ref 98–107)
CO2 SERPL-SCNC: 29 MMOL/L (ref 21–32)
CREAT SERPL-MCNC: 0.75 MG/DL (ref 0.5–1.05)
EGFRCR SERPLBLD CKD-EPI 2021: 77 ML/MIN/1.73M*2
ERYTHROCYTE [DISTWIDTH] IN BLOOD BY AUTOMATED COUNT: 25.2 % (ref 11.5–14.5)
GLUCOSE SERPL-MCNC: 134 MG/DL (ref 74–99)
HCT VFR BLD AUTO: 30.7 % (ref 36–46)
HGB BLD-MCNC: 8.8 G/DL (ref 12–16)
MCH RBC QN AUTO: 22.3 PG (ref 26–34)
MCHC RBC AUTO-ENTMCNC: 28.7 G/DL (ref 32–36)
MCV RBC AUTO: 78 FL (ref 80–100)
NRBC BLD-RTO: 0 /100 WBCS (ref 0–0)
PLATELET # BLD AUTO: 288 X10*3/UL (ref 150–450)
POTASSIUM SERPL-SCNC: 3.1 MMOL/L (ref 3.5–5.3)
RBC # BLD AUTO: 3.95 X10*6/UL (ref 4–5.2)
SODIUM SERPL-SCNC: 139 MMOL/L (ref 136–145)
WBC # BLD AUTO: 6.7 X10*3/UL (ref 4.4–11.3)

## 2025-07-07 PROCEDURE — 2500000004 HC RX 250 GENERAL PHARMACY W/ HCPCS (ALT 636 FOR OP/ED): Performed by: INTERNAL MEDICINE

## 2025-07-07 PROCEDURE — 97535 SELF CARE MNGMENT TRAINING: CPT | Mod: GO,CO

## 2025-07-07 PROCEDURE — 97530 THERAPEUTIC ACTIVITIES: CPT | Mod: GP,CQ

## 2025-07-07 PROCEDURE — 2500000004 HC RX 250 GENERAL PHARMACY W/ HCPCS (ALT 636 FOR OP/ED): Performed by: FAMILY MEDICINE

## 2025-07-07 PROCEDURE — 80048 BASIC METABOLIC PNL TOTAL CA: CPT | Performed by: FAMILY MEDICINE

## 2025-07-07 PROCEDURE — 99232 SBSQ HOSP IP/OBS MODERATE 35: CPT

## 2025-07-07 PROCEDURE — 36415 COLL VENOUS BLD VENIPUNCTURE: CPT | Performed by: FAMILY MEDICINE

## 2025-07-07 PROCEDURE — 94760 N-INVAS EAR/PLS OXIMETRY 1: CPT

## 2025-07-07 PROCEDURE — 2500000002 HC RX 250 W HCPCS SELF ADMINISTERED DRUGS (ALT 637 FOR MEDICARE OP, ALT 636 FOR OP/ED): Performed by: FAMILY MEDICINE

## 2025-07-07 PROCEDURE — 97110 THERAPEUTIC EXERCISES: CPT | Mod: GP,CQ

## 2025-07-07 PROCEDURE — 2500000001 HC RX 250 WO HCPCS SELF ADMINISTERED DRUGS (ALT 637 FOR MEDICARE OP)

## 2025-07-07 PROCEDURE — 2060000001 HC INTERMEDIATE ICU ROOM DAILY

## 2025-07-07 PROCEDURE — 2500000004 HC RX 250 GENERAL PHARMACY W/ HCPCS (ALT 636 FOR OP/ED)

## 2025-07-07 PROCEDURE — 2500000001 HC RX 250 WO HCPCS SELF ADMINISTERED DRUGS (ALT 637 FOR MEDICARE OP): Performed by: FAMILY MEDICINE

## 2025-07-07 PROCEDURE — 85027 COMPLETE CBC AUTOMATED: CPT | Performed by: FAMILY MEDICINE

## 2025-07-07 PROCEDURE — 97530 THERAPEUTIC ACTIVITIES: CPT | Mod: GO,CO

## 2025-07-07 RX ORDER — PANTOPRAZOLE SODIUM 40 MG/1
40 TABLET, DELAYED RELEASE ORAL
Status: DISCONTINUED | OUTPATIENT
Start: 2025-07-08 | End: 2025-07-08 | Stop reason: HOSPADM

## 2025-07-07 RX ORDER — ENOXAPARIN SODIUM 100 MG/ML
40 INJECTION SUBCUTANEOUS DAILY
Start: 2025-07-08

## 2025-07-07 RX ORDER — POTASSIUM CHLORIDE 20 MEQ/1
40 TABLET, EXTENDED RELEASE ORAL ONCE
Status: COMPLETED | OUTPATIENT
Start: 2025-07-07 | End: 2025-07-07

## 2025-07-07 RX ADMIN — PYRIDOXINE HCL TAB 50 MG 50 MG: 50 TAB at 08:24

## 2025-07-07 RX ADMIN — OXYBUTYNIN CHLORIDE 5 MG: 5 TABLET ORAL at 20:55

## 2025-07-07 RX ADMIN — ENOXAPARIN SODIUM 40 MG: 100 INJECTION SUBCUTANEOUS at 08:24

## 2025-07-07 RX ADMIN — CETIRIZINE HYDROCHLORIDE 10 MG: 10 TABLET, FILM COATED ORAL at 08:24

## 2025-07-07 RX ADMIN — POLYETHYLENE GLYCOL 3350 17 G: 17 POWDER, FOR SOLUTION ORAL at 08:24

## 2025-07-07 RX ADMIN — PYRIDOXINE HCL TAB 50 MG 50 MG: 50 TAB at 20:55

## 2025-07-07 RX ADMIN — METOPROLOL TARTRATE 25 MG: 25 TABLET, FILM COATED ORAL at 20:55

## 2025-07-07 RX ADMIN — OXYBUTYNIN CHLORIDE 5 MG: 5 TABLET ORAL at 08:24

## 2025-07-07 RX ADMIN — METOPROLOL TARTRATE 25 MG: 25 TABLET, FILM COATED ORAL at 08:23

## 2025-07-07 RX ADMIN — FUROSEMIDE 40 MG: 40 TABLET ORAL at 08:23

## 2025-07-07 RX ADMIN — POTASSIUM CHLORIDE 40 MEQ: 1500 TABLET, EXTENDED RELEASE ORAL at 08:24

## 2025-07-07 ASSESSMENT — ACTIVITIES OF DAILY LIVING (ADL): HOME_MANAGEMENT_TIME_ENTRY: 13

## 2025-07-07 ASSESSMENT — COGNITIVE AND FUNCTIONAL STATUS - GENERAL
MOVING TO AND FROM BED TO CHAIR: A LITTLE
WALKING IN HOSPITAL ROOM: A LOT
TURNING FROM BACK TO SIDE WHILE IN FLAT BAD: A LITTLE
MOVING TO AND FROM BED TO CHAIR: A LOT
DRESSING REGULAR UPPER BODY CLOTHING: A LOT
TOILETING: A LOT
HELP NEEDED FOR BATHING: A LOT
CLIMB 3 TO 5 STEPS WITH RAILING: TOTAL
MOBILITY SCORE: 15
WALKING IN HOSPITAL ROOM: A LOT
DRESSING REGULAR LOWER BODY CLOTHING: TOTAL
DAILY ACTIVITIY SCORE: 14
DRESSING REGULAR UPPER BODY CLOTHING: A LITTLE
MOBILITY SCORE: 13
TURNING FROM BACK TO SIDE WHILE IN FLAT BAD: A LITTLE
PERSONAL GROOMING: A LITTLE
STANDING UP FROM CHAIR USING ARMS: A LOT
STANDING UP FROM CHAIR USING ARMS: A LITTLE
MOVING FROM LYING ON BACK TO SITTING ON SIDE OF FLAT BED WITH BEDRAILS: A LITTLE
DRESSING REGULAR LOWER BODY CLOTHING: A LOT
DAILY ACTIVITIY SCORE: 14
PERSONAL GROOMING: A LOT
TOILETING: TOTAL
CLIMB 3 TO 5 STEPS WITH RAILING: TOTAL
MOVING FROM LYING ON BACK TO SITTING ON SIDE OF FLAT BED WITH BEDRAILS: A LITTLE
HELP NEEDED FOR BATHING: A LOT

## 2025-07-07 ASSESSMENT — PAIN SCALES - GENERAL
PAINLEVEL_OUTOF10: 0 - NO PAIN

## 2025-07-07 ASSESSMENT — PAIN - FUNCTIONAL ASSESSMENT
PAIN_FUNCTIONAL_ASSESSMENT: 0-10

## 2025-07-07 NOTE — CARE PLAN
The patient's goals for the shift include    To work with therapy  The clinical goals for the shift include Pt will remain safe and HDS throughout the shift.

## 2025-07-07 NOTE — PROGRESS NOTES
Physical Therapy                 Therapy Communication Note    Patient Name: Vanessa Adame  MRN: [id number]  Department: [address]  Room: [address] [place], [place] [address]  Today's Date: 7/7/2025     Discipline: Physical Therapy    Missed Visit: PT Missed Visit: Yes     Missed Visit Reason: Missed Visit Reason: Other (Comment) (GUERRA working with patient)    Missed Time: Attempt @ 1100    Comment:

## 2025-07-07 NOTE — HOSPITAL COURSE
Vanessa Adame is a 89 y.o. female with a past medical history of DVT on Coumadin, CHF, CKD, dementia, diabetes mellitus type 2,  overactive bladder, spinal stenosis and UTI who initially presented with weakness and admitted for an acutely worsening anemia with a supratherapeutic INR of 7.2 and hemoglobin of 4.5. Throughout her hospital stay, patient received four units of packed red blood cells, 5 mg of oral vitamin K and one unit of fresh frozen plasma. Patient's INR was closely monitored and gradually brought down. It was recommended against restarting patient's warfarin therapy as her previous DVT was provoked in the setting of a prolonged hospital stay for which patient completed 5 months of therapy.  DVT ultrasound study was done and negative now for thrombus.  Patient's warfarin will be discontinued at discharge, and will be sent to a SNF on Lovenox for DVT prophylaxis.  In addition, patient's blood pressure was initially hypotensive in the setting of her acute anemia, and enalapril was held.  Blood pressure continued to be stable throughout her hospital stay and will hold enalapril at discharge.   
Home

## 2025-07-07 NOTE — PROGRESS NOTES
Vanessa Adame is a 89 y.o. female on day 5 of admission presenting with GI bleed.    Subjective   No acute events overnight, patient dong well this morning, resting in bed. Waiting on SNF placement.        Objective     Physical Exam  CONSTITUTIONAL - well developed, looks like stated age, in no acute distress  SKIN - normal skin color and pigmentation, nodule on anterior forehead  HEAD - no trauma, normocephalic  EYES - normal external exam  LUNG - clear to auscultation, good effort  CARDIAC - regular rate and regular rhythm  ABDOMEN - no organomegaly, soft, nontender, nondistended, normal bowel sounds  EXTREMITIES - generalized weakness  NEUROLOGICAL - alert and oriented x 3   PSYCHIATRIC - alert, pleasant and cordial, age-appropriate    Last Recorded Vitals  Blood pressure 117/74, pulse 71, temperature 36.4 °C (97.5 °F), temperature source Temporal, resp. rate 12, height (!) 1.524 m (5'), weight 112 kg (246 lb), SpO2 97%.  Intake/Output last 3 Shifts:  I/O last 3 completed shifts:  In: 630 (5.6 mL/kg) [P.O.:630]  Out: 1525 (13.7 mL/kg) [Urine:1525 (0.4 mL/kg/hr)]  Weight: 111.6 kg     Relevant Results  Scheduled medications  Scheduled Medications[1]  Continuous medications  Continuous Medications[2]  PRN medications  PRN Medications[3]    Results for orders placed or performed during the hospital encounter of 07/02/25 (from the past 24 hours)   CBC   Result Value Ref Range    WBC 6.7 4.4 - 11.3 x10*3/uL    nRBC 0.0 0.0 - 0.0 /100 WBCs    RBC 3.95 (L) 4.00 - 5.20 x10*6/uL    Hemoglobin 8.8 (L) 12.0 - 16.0 g/dL    Hematocrit 30.7 (L) 36.0 - 46.0 %    MCV 78 (L) 80 - 100 fL    MCH 22.3 (L) 26.0 - 34.0 pg    MCHC 28.7 (L) 32.0 - 36.0 g/dL    RDW 25.2 (H) 11.5 - 14.5 %    Platelets 288 150 - 450 x10*3/uL   Basic Metabolic Panel   Result Value Ref Range    Glucose 134 (H) 74 - 99 mg/dL    Sodium 139 136 - 145 mmol/L    Potassium 3.1 (L) 3.5 - 5.3 mmol/L    Chloride 102 98 - 107 mmol/L    Bicarbonate 29 21 - 32  mmol/L    Anion Gap 11 10 - 20 mmol/L    Urea Nitrogen 9 6 - 23 mg/dL    Creatinine 0.75 0.50 - 1.05 mg/dL    eGFR 77 >60 mL/min/1.73m*2    Calcium 9.2 8.6 - 10.3 mg/dL       Transthoracic Echo Complete  Result Date: 7/3/2025   [facility], [address] [place] [address]               Tel [phone] and Fax [phone] TRANSTHORACIC ECHOCARDIOGRAM REPORT  Patient Name:       BENJI Pena Physician:    [id number] Demarcus Perdomo MD Study Date:         7/3/2025            Ordering Provider:    [id number] HENRY CARNES MRN/PID:            [id number]            Fellow: Accession#:         [id number]        Nurse: Date of Birth/Age:  1935 / 89      Sonographer:          Sameera mcbride RDCS Gender assigned at  F                   Additional Staff: Birth: Height:                                 Admit Date:           7/1/2025 Weight:             111.58 kg           Admission Status:     Inpatient -                                                               Routine BSA / BMI:          2.32 m2 / kg/m2     Encounter#:           [id number] Blood Pressure:     124/51 mmHg         Department Location:  [facility]                                                               Invasive Study Type:    TRANSTHORACIC ECHO (TTE) COMPLETE Diagnosis/ICD: Localized edema-R60.0 Indication:    Edema CPT Code:      Echo Complete w Full [id number] Patient History: Pertinent History: Previous DVT, CHF, Hyperlipidemia, HTN and DM, CKD. Study Detail: The following Echo studies were performed: 2D, M-Mode, Doppler and               color flow. Image quality for this study is poor. Technically               challenging study due to Pt moving throughout exam. Unable to               obtain suprasternal notch and IVC  view.  PHYSICIAN INTERPRETATION: Left Ventricle: Left ventricular ejection fraction is moderately decreased by visual estimate at 35-40%. There is mild eccentric left ventricular hypertrophy. There is global hypokinesis of the left ventricle with minor regional variations. The left ventricular cavity size is mildly dilated. There is mildly increased septal and normal posterior left ventricular wall thickness. Abnormal (paradoxical) septal motion, consistent with left bundle branch block. Left ventricular diastolic filling cannot be determined due to left bundle branch block. Left Atrium: The left atrial size was not well visualized. Right Ventricle: The right ventricle is normal in size. There is normal right ventricular global systolic function. Right Atrium: The right atrium is normal in size. Aortic Valve: The aortic valve is probably trileaflet. The aortic valve area by VTI is 2.44 cmï¿½ with a peak velocity of 1.23 m/s. The peak and mean gradients are 5 mmHg and 4 mmHg, respectively with a dimensionless index of 0.60. There is mild to moderate aortic valve cusp calcification. There is trace to mild aortic valve regurgitation. Mitral Valve: The mitral valve is mildly thickened. The doppler estimated peak and mean diastolic pressure gradients are 5.4 mmHg and 2 mmHg, respectively. There is mild mitral annular calcification. The mean gradient of the mitral valve is 2 mmHg. There is mild mitral valve regurgitation. The E Vmax is 1.00 m/s. Tricuspid Valve: The tricuspid valve is structurally normal. There is trace to mild tricuspid regurgitation. The Doppler estimated right ventricular systolic pressure (RVSP) is slightly elevated at 33 mmHg. Pulmonic Valve: The pulmonic valve is not well visualized. There is trace pulmonic valve regurgitation. Pericardium: Trivial pericardial effusion. Aorta: The aortic root is normal. Systemic Veins: The inferior vena cava was not well visualized, IVC inspiratory collapse is not  well visualized.  CONCLUSIONS:  1. Left ventricular ejection fraction is moderately decreased by visual estimate at 35-40%.  2. There is global hypokinesis of the left ventricle with minor regional variations.  3. Left ventricular cavity size is mildly dilated.  4. Abnormal septal motion consistent with left bundle branch block.  5. There is normal right ventricular global systolic function.  6. The Doppler estimated RVSP is slightly elevated at 33 mmHg. QUANTITATIVE DATA SUMMARY:  2D MEASUREMENTS:          Normal Ranges: IVSd:            1.17 cm  (0.6-1.1cm) LVPWd:           0.76 cm  (0.6-1.1cm) LVIDd:           5.79 cm  (3.9-5.9cm) LVIDs:           4.39 cm LV Mass Index:   96 g/m2 LVEDV Index:     44 ml/m2 LV % FS          24.2 %  LEFT ATRIUM:                  Normal Ranges: LA Vol A4C:        48.5 ml    (22+/-6mL/m2) LA Vol A2C:        16.9 ml LA Vol BP:         29.9 ml LA Vol Index A4C:  20.9 ml/m2 LA Vol Index A2C:  7.3 ml/m2 LA Vol Index BP:   12.9 ml/m2 LA Area A4C:       18.2 cm2 LA Area A2C:       10.3 cm2 LA Major Axis A4C: 5.8 cm LA Major Axis A2C: 5.3 cm LA Vol A4C:        44.9 ml LA Vol A2C:        57.2 ml LA Vol Index BSA:  22.0 ml/m2  RIGHT ATRIUM:          Normal Ranges: RA Area A4C:  10.2 cm2  AORTA MEASUREMENTS:         Normal Ranges: Ao Sinus, d:        2.90 cm (2.1-3.5cm) Asc Ao, d:          3.40 cm (2.1-3.4cm)  LV SYSTOLIC FUNCTION:                      Normal Ranges: EF-A4C View:    38 % (>=55%) EF-A2C View:    43 % EF-Biplane:     42 % EF-Visual:      38 % LV EF Reported: 38 %  LV DIASTOLIC FUNCTION:          Normal Ranges: MV Peak E:             1.00 m/s (0.7-1.2 m/s) MV Peak A:             0.30 m/s (0.42-0.7 m/s) E/A Ratio:             3.35     (1.0-2.2)  MITRAL VALVE:          Normal Ranges: MV Vmax:      1.16 m/s (<=1.3m/s) MV peak P.4 mmHg (<5mmHg) MV mean P.4 mmHg (<2mmHg) MV VTI:       27.39 cm (10-13cm) MV DT:        131 msec (150-240msec)  AORTIC VALVE:                      Normal Ranges: AoV Vmax:                1.23 m/s (<=1.7m/s) AoV Peak P.0 mmHg (<20mmHg) AoV Mean PG:             3.7 mmHg (1.7-11.5mmHg) LVOT Max Cordell:            0.69 m/s (<=1.1m/s) AoV VTI:                 27.62 cm (18-25cm) LVOT VTI:                16.67 cm LVOT Diameter:           2.27 cm  (1.8-2.4cm) AoV Area, VTI:           2.44 cm2 (2.5-5.5cm2) AoV Area,Vmax:           2.26 cm2 (2.5-4.5cm2) AoV Dimensionless Index: 0.60  RIGHT VENTRICLE: RV Basal 3.00 cm RV Mid   1.80 cm RV Major 7.0 cm TAPSE:   18.0 mm RV s'    0.11 m/s  TRICUSPID VALVE/RVSP:          Normal Ranges: Peak TR Velocity:     2.74 m/s Est. RA Pressure:     3 RV Syst Pressure:     33       (< 30mmHg)  PULMONIC VALVE:          Normal Ranges: PV Max Cordell:     0.9 m/s  (0.6-0.9m/s) PV Max PG:      3.0 mmHg  AORTA: Asc Ao Diam 3.40 cm  [id number] Demarcus Perdomo MD Electronically signed on 7/3/2025 at 2:37:34 PM  ** Final **     Esophagogastroduodenoscopy (EGD)  Result Date: 7/3/2025  Table formatting from the original result was not included. Impression Ring in the GE junction Mild erythematous mucosa in the antrum The duodenal bulb, 1st part of the duodenum and 2nd part of the duodenum appeared normal.     No old/fresh bleeding/ulcer after D2 Findings Regular Z-line 36 cm from the incisors. UES felt to be tight Non-obstructing ring in the GE junction Mild, localized erythematous mucosa in the antrum The duodenal bulb, 1st part of the duodenum and 2nd part of the duodenum appeared normal. Recommendation Monitor CBC. Restart Coumadin depending on hemoglobin trend  Indication Acute blood loss anemia, GI bleed severe anemia, supratherapeutic INR, FOBT positive.  Colonoscopy in .  No previous EGD. Staff Staff Role Olga K Mariano, MD Proceduralist Medications See Anesthesia Record. Preprocedure A history and physical has been performed, and patient medication allergies have been reviewed. The patient's tolerance of previous anesthesia has  been reviewed. The risks and benefits of the procedure and the sedation options and risks were discussed with the patient. All questions were answered and informed consent obtained. Details of the Procedure The patient underwent monitored anesthesia care, which was administered by an anesthesia professional. The patient's blood pressure, ECG, ETCO2, heart rate, level of consciousness, oxygen and respirations were monitored throughout the procedure. The scope was introduced through the mouth and advanced to the second part of the duodenum. Retroflexion was performed in the cardia. Prior to the procedure, the patient's H. Pylori status was unknown. The patient experienced no blood loss. The procedure was not difficult. The patient tolerated the procedure well. There were no apparent adverse events. Events Procedure Events Event Event Time ENDO SCOPE IN TIME 7/3/2025 12:00 PM ENDO SCOPE OUT TIME 7/3/2025 12:04 PM Specimens No specimens collected Procedure Location [address] [address] [phone] Referring Provider Olga Quintana MD Procedure Provider Olga Quintana MD     Vascular US lower extremity venous duplex bilateral  Result Date: 7/2/2025          [place] [id number]  Tel [phone] and Fax [phone]  Vascular Lab Report VASC US LOWER EXTREMITY VENOUS DUPLEX BILATERAL  Patient Name:      BENJI Pena Physician: [id number] Darian Albright MD                    [place] Study Date:        7/2/2025            [facility]           [id number] [facility]                                        Physician:         DEANNE MRN/PID:           [id number]            Technologist:      Melody Elise S Accession#:        [id number]        Technologist 2: Date of Birth/Age: 1935 / 89      Encounter#:        [id number]                    years Gender:            F Admission Status:   Inpatient           Location           [facility]                                        Performed:  Diagnosis/ICD: Localized (leg) edema-R60.0 CPT Codes:     17392 Peripheral venous duplex scan for DVT complete  CONCLUSIONS: Right Lower Venous: No evidence of acute deep vein thrombus visualized in the right lower extremity. Left Lower Venous: Patient refused left lower extremity.  Additional Findings: Technically difficult and limited exam due to patient refusal and inability to tolerate compressions.  Imaging & Doppler Findings:  Right                 Compressible Thrombus        Flow Distal External Iliac                       Spontaneous/Phasic CFV                       Yes        None   Spontaneous/Phasic PFV                       Yes        None FV Proximal               Yes        None   Spontaneous/Phasic FV Mid                    Yes        None FV Distal                 Yes        None Popliteal                 Yes        None   Spontaneous/Phasic Peroneal                  Yes        None PTV                       Yes        None  [id number] Darian Albright MD Electronically signed by [id number] Darian Albright MD on 7/2/2025 at 9:24:47 AM  ** Final **     ECG 12 lead  Result Date: 7/2/2025  Sinus rhythm with 1st degree AV block Left axis deviation Left bundle branch block Abnormal ECG When compared with ECG of 10-FEB-2025 20:34, No significant change was found    CT chest abdomen pelvis w IV contrast  Result Date: 7/1/2025  Interpreted By:  Zain Chandler, STUDY: CT CHEST ABDOMEN PELVIS W IV CONTRAST; 7/1/2025 3:00 pm   INDICATION: Signs/Symptoms:UTI, confusion..   COMPARISON: CT of the abdomen and pelvis dated 02/10/2025   ACCESSION NUMBER(S): [id number]   ORDERING CLINICIAN: AN HUERTA   TECHNIQUE: Contiguous axial CT images were obtained at 3mm slice thickness through the chest, abdomen and pelvis following intravenous contrast administration. Coronal and sagittal reconstructions at 3 mm slice  thickness were then performed. 100 ML of Omnipaque 350 was administered.   FINDINGS: CHEST:   CHEST WALL AND LOWER NECK: Evaluation of the visualized neck base demonstrates no gross mass or lymphadenopathy.   MEDIASTINUM AND KAY: There is no gross axillary, kay or mediastinal lymphadenopathy identified.   HEART: The heart is at the upper limits of normal for size. No pericardial effusion is identified. Scattered atherosclerotic calcifications are seen in the coronary arteries. Minimal aortic valve calcifications are present.   VESSELS: Scattered atherosclerotic calcifications are seen in the aortic arch and descending thoracic aorta, including at the origins of the arch vessels. The thoracic aorta is within normal limits for course and caliber, without evidence of aneurysm.   LUNG/PLEURA/LARGE AIRWAYS: Mild scarring and/or atelectasis is seen at the lung bases bilaterally. There is no focal infiltrate, pleural effusion or pneumothorax identified. No discrete pulmonary nodules or masses are seen.   ABDOMEN:   LIVER: Rounded hypodensities are seen in the liver, measuring at up to 5.3 cm in diameter, most consistent with cysts. No definite enhancing hepatic mass is seen.   BILE DUCTS: No definite intra or extrahepatic biliary dilatation is identified.   GALLBLADDER: The gallbladder is surgically absent ,with surgical clips seen in the gallbladder fossa.   PANCREAS: The pancreas is within normal limits for appearance, without evidence of focal masses.   SPLEEN: The spleen is within normal limits for size. No focal splenic mass is seen.   ADRENAL GLANDS: No definite adrenal nodules or masses are seen bilaterally.   KIDNEYS AND URETERS: There is no hydronephrosis, hydroureter or renal/ ureteral calculus identified bilaterally. No definite focal renal mass is seen.   PELVIS:   BLADDER: The urinary bladder is grossly unremarkable for CT appearance.   REPRODUCTIVE ORGANS: The uterus and ovaries are grossly unremarkable for  CT appearance.   BOWEL: Multiple diverticuli are seen throughout the sigmoid colon. The colon and small bowel are within normal limits for course, caliber and appearance, without evidence of wall thickening or obstruction. The appendix is decompressed. No CT evidence of acute diverticulitis or appendicitis is seen.   VESSELS: Scattered atherosclerotic calcifications are seen throughout the infrarenal abdominal aorta and iliac arteries. The abdominal aorta is within normal limits for course, caliber and appearance, without evidence of aneurysm.   PERITONEUM/RETROPERITONEUM/LYMPH NODES: There is no free intraperitoneal air or free fluid identified. No gross mesenteric or retroperitoneal lymphadenopathy is identified.   BONE AND SOFT TISSUE: There is no evidence of acute fracture identified. No evidence of abdominal wall mass or hernia is identified.       CHEST: 1. Scarring and/or atelectasis at the lung bases bilaterally. 2. No pleural effusion or pneumothorax identified.   ABDOMEN-PELVIS: 1. Hypodensities in the liver, most consistent with cysts. 2. Colonic diverticulosis, without evidence of acute diverticulitis. 3. No evidence of bowel obstruction, free intraperitoneal air or abnormal intra-abdominal fluid collection.   MACRO: None   Signed by: Zain Chandler 7/1/2025 3:46 PM Dictation workstation:   [id number]    CT head wo IV contrast  Result Date: 7/1/2025  Interpreted By:  Zain Chandler, STUDY: CT HEAD WO IV CONTRAST; 7/1/2025 3:00 pm   INDICATION: Signs/Symptoms:altered mental status.   COMPARISON: CT head dated 05/29/2025   ACCESSION NUMBER(S): [id number]   ORDERING CLINICIAN: AN HUERTA   TECHNIQUE: Contiguous axial CT images were obtained through the head at 2 mm slice thickness without contrast administration.   FINDINGS: INTRACRANIAL: The ventricles, sulci and basal cisterns are within normal limits for size and configuration. The grey-white differentiation is intact. There is no mass effect or  midline shift. There is no extraaxial fluid collection. There is no intracranial hemorrhage.  The calvarium is unremarkable.   EXTRACRANIAL: Visualized paranasal sinuses and mastoids are clear.       No evidence of acute cortical infarct or intracranial hemorrhage.   MACRO: None     Signed by: Zain Chandler 7/1/2025 3:31 PM Dictation workstation:   [id number]           Assessment & Plan      Vanessa Adame is a 89 y.o. female with a past medical history of DVT on Coumadin, CHF, CKD, dementia, diabetes mellitus type 2,  overactive bladder, spinal stenosis and UTI who initially presented with weakness admitted for an acutely worsening anemia.    Acutely worsening anemia, improved   - in setting of supratherapeutic INR without signs of bleeding   - EGD on 7/3 was negative    -hematologic work-up unremarkable   - s/p 4u pRBC, 1u FFP, and 5mg vitamin K [id number]   - daily CBC to ensure stabilization   -Transfuse if Hg <7     DVT on warfarin   - patient was diagnosed with DVT while admitted in Feb 2025 for diverticulitis   - seems DVT is provoked in setting of immobility, and completed 5mths of treatment   - no signs of hypercoagulable state, hematologic workup unremarkable   - repeat ultrasound is negative now for thrombus   - discontinue warfarin   - will continue Lovenox for DVT ppx    Hypertension   - was initially hypotensive in setting of above   - continue home lasix and metoprolol   - continue holding home enalapril 20 mg as BP stable, will hold upon discharge    Hypokalemia   -Replete PRN     Dispo: patient medically stable for discharge awaiting SNF.    Carrie Lane DO  PGY 3  Family Medicine             [1] cetirizine, 10 mg, oral, Daily  [Held by provider] enalapril, 20 mg, oral, Daily  enoxaparin, 40 mg, subcutaneous, Daily  furosemide, 40 mg, oral, Daily  metoprolol tartrate, 25 mg, oral, BID  oxyBUTYnin, 5 mg, oral, BID  [START ON 7/8/2025] pantoprazole, 40 mg, oral, Daily before breakfast  polyethylene  glycol, 17 g, oral, Daily  pyridoxine, 50 mg, oral, BID     [2]    [3] PRN medications: acetaminophen **OR** acetaminophen **OR** acetaminophen, calcium carbonate, dextrose, dextrose, fluticasone, glucagon, glucagon, melatonin, ondansetron **OR** ondansetron, oxyCODONE, oxygen, polyethylene glycol, traMADol

## 2025-07-07 NOTE — PROGRESS NOTES
Physical Therapy    Physical Therapy Treatment    Patient Name: Vanessa Adame  MRN: [id number]  Department: [address]  Room: [address] [place] [address]  Today's Date: 7/7/2025  Time Calculation  Start Time: 1137  Stop Time: 1203  Time Calculation (min): 26 min         Assessment/Plan   PT Assessment  PT Assessment Results: Decreased strength, Decreased endurance, Impaired balance, Decreased mobility  Rehab Prognosis: Good  Evaluation/Treatment Tolerance: Patient limited by fatigue  Medical Staff Made Aware: Yes  End of Session Communication: Bedside nurse  End of Session Patient Position: Up in chair, Alarm on (Lunch tray in front of patient, Call light and phone in reach)     PT Plan  Treatment/Interventions: Transfer training, Gait training, Stair training, Balance training, Strengthening, Endurance training, Therapeutic exercise  PT Plan: Ongoing PT  PT Frequency: 3 times per week  PT Discharge Recommendations: Moderate intensity level of continued care  Equipment Recommended upon Discharge: Wheeled walker  PT Recommended Transfer Status: Assist x1 (Recommended use of BSC at this time, d/t low activity tolerance)  PT - OK to Discharge: Yes (per PT POC)    PT Visit Info:  PT Received On: 07/07/25  Response to Previous Treatment: Patient with no complaints from previous session.     General Visit Information:   General  Reason for Referral: Impaired functional mobility; GI Bleed  Referred By: Sushila Alegre  Past Medical History Relevant to Rehab: DVT on Coumadin, anxiety, CHF, CKD, dementia, diabetes mellitus type 2, nocturnal hypoxia (patient wears 1 to 2 L of oxygen at nighttime), hyperlipidemia, hypertension, hypokalemia, overactive bladder, spinal stenosis and UT  Family/Caregiver Present: No  Prior to Session Communication: Bedside nurse  Patient Position Received: Bed, 3 rail up, Alarm on  Preferred Learning Style: auditory, visual  General Comment: Pleasant and cooperative.    Subjective    Precautions:  Precautions  Medical Precautions: Fall precautions     Date/Time Vitals Session Patient Position Pulse Resp SpO2 BP MAP (mmHg)    07/07/25 1137 During PT  --  74  --  98 %  --  --     07/07/25 1200 --  --  71  12  97 %  117/74  87                 Objective   Pain:  Pain Assessment  Pain Assessment: 0-10  0-10 (Numeric) Pain Score: 0 - No pain  Cognition:  Cognition  Overall Cognitive Status: Impaired  Orientation Level: Disoriented to place, Disoriented to time  Problem Solving: Exceptions to WFL  Simple Functional Tasks: Impaired  Processing Speed: Delayed  Coordination:     Postural Control:  Postural Control  Postural Control: Impaired  Trunk Control: Forward flexed posture (Corrected slightly in standing with max VC using FWW)  Static Sitting Balance  Static Sitting-Balance Support: No upper extremity supported  Static Sitting-Level of Assistance: Close supervision  Dynamic Sitting Balance  Dynamic Sitting-Balance Support: No upper extremity supported  Dynamic Sitting-Level of Assistance: Close supervision  Dynamic Sitting-Balance: Lateral lean, Forward lean  Static Standing Balance  Static Standing-Balance Support: Bilateral upper extremity supported  Static Standing-Level of Assistance: Close supervision  Extremity/Trunk Assessments:                      Activity Tolerance:  Activity Tolerance  Endurance: Tolerates 10 - 20 min exercise with multiple rests  Treatments:  Therapeutic Exercise  Therapeutic Exercise Performed: Yes  Therapeutic Exercise Activity 1: Seated ther ex x 10 each B LE (HR/ TR, LAQ, ABD/ADD, Hip Flex, HS (Demo provided for technique. Extended time to completee).)    Therapeutic Activity  Therapeutic Activity Performed: Yes  Therapeutic Activity 1: Trunk lean forward/ back, Lateral L <>R x 10 each (To initiate trunk control to improve standing balance.)         Bed Mobility  Bed Mobility: Yes  Bed Mobility 1  Bed Mobility 1: Supine to sitting  Level of Assistance 1: Minimum  assistance  Bed Mobility Comments 1: Min A to assist with trunk to upright seated position. (Extended time to complete. Patient appeared confused with sequencing and technique. One step insturction provided.)    Ambulation/Gait Training  Ambulation/Gait Training Performed: Yes  Ambulation/Gait Training 1  Surface 1: Level tile  Device 1: Rolling walker  Assistance 1: Contact guard (This PTA assisting with FWW management. Consistent VC for upright posture and where to place feet on floor.)  Comments/Distance (ft) 1: several small steps from EOB to chair.  Transfers  Transfer: Yes  Transfer 1  Transfer From 1: Bed to  Transfer to 1: Stand  Technique 1: Sit to stand  Transfer Device 1: Walker  Transfer Level of Assistance 1: Moderate assistance  Trials/Comments 1: Patient unable to maintain upright posture for entire transfer. (Max VC to stand upright and direction to advance feet,)  Transfers 2  Transfer From 2: Sit to  Transfer to 2: Chair with arms  Technique 2: Stand to sit  Transfer Device 2: Walker  Transfer Level of Assistance 2: Minimum assistance  Trials/Comments 2: Vc for hand placement    Outcome Measures:  [facility] Basic Mob[facility]  Turning from your back to your side while in a flat bed without using bedrails: A little  Moving from lying on your back to sitting on the side of a flat bed without using bedrails: A little  Moving to and from bed to chair (including a wheelchair): A little  Standing up from a chair using your arms (e.g. wheelchair or bedside chair): A little  To walk in hospital room: A lot  Climbing 3-5 steps with railing: Total  Basic Mobility - Total Score: 15    Education Documentation  Body Mechanics, taught by Renu Malave PTA at 7/7/2025  1:14 PM.  Learner: Patient  Readiness: Acceptance  Method: Explanation, Demonstration  Response: Verbalizes Understanding, Needs Reinforcement    Home Exercise Program, taught by Renu Malave PTA at 7/7/2025  1:14 PM.  Learner: Patient  Readiness:  Acceptance  Method: Explanation, Demonstration  Response: Verbalizes Understanding, Needs Reinforcement    Precautions, taught by Renu Malave PTA at 7/7/2025  1:14 PM.  Learner: Patient  Readiness: Acceptance  Method: Explanation, Demonstration  Response: Verbalizes Understanding, Needs Reinforcement    Mobility Training, taught by Renu Malave PTA at 7/7/2025  1:14 PM.  Learner: Patient  Readiness: Acceptance  Method: Explanation, Demonstration  Response: Verbalizes Understanding, Needs Reinforcement    Education Comments  No comments found.        OP EDUCATION:       Encounter Problems       Encounter Problems (Active)       Balance       STG - Maintains dynamic standing balance with upper extremity support x 3' with dual task supervision  (Progressing)       Start:  07/04/25    Expected End:  07/18/25               Mobility       STG - Patient will ambulate with walker 50' mod I  (Progressing)       Start:  07/04/25    Expected End:  07/18/25               PT Transfers       STG - Patient will transfer sit to and from stand mod I  (Progressing)       Start:  07/04/25    Expected End:  07/18/25

## 2025-07-07 NOTE — PROGRESS NOTES
Occupational Therapy    Occupational Therapy Treatment    Name: Vanessa Adame  MRN: [id number]  Department: [address]  Room: [address] [place] [address]  Date: 07/07/25  Time Calculation  Start Time: 1045  Stop Time: 1113  Time Calculation (min): 28 min    Assessment:  OT Assessment: Pt engaged in OT treatment, continues to demonstrate difficulty with direction following and sequencing basic tasks. Pt continues to demonstrate difficulty in ADLs and functional transfers due in large part to impaired executive skills in the inpatient environment. Continue to recommend MOD OT intervention over course of inpatient stay and in discharge setting location.  Prognosis: Good  Barriers to Discharge Home: No anticipated barriers  Evaluation/Treatment Tolerance: Patient tolerated treatment well, Other (Comment) (Pt with difficulty with direction following and processing motor planning instruction.)  Medical Staff Made Aware: Yes  End of Session Communication: Bedside nurse  End of Session Patient Position: Bed, 3 rail up, Alarm off, not on at start of session (Discussed need for bed alarm repair with unit secretary, CNA and nurse. Nurse aware of patient remaining unalarmed at end of session.)  Plan:  Treatment Interventions: ADL retraining, UE strengthening/ROM, Endurance training, Cognitive reorientation, Patient/family training, Fine motor coordination activities, Compensatory technique education  OT Frequency: 3 times per week  OT Discharge Recommendations: Moderate intensity level of continued care  Equipment Recommended upon Discharge: Wheeled walker (owns)  OT Recommended Transfer Status: Assist of 1, Assist of 2 (performance fluctuates)  OT - OK to Discharge: Yes (in accord with OT POC)    Subjective     OT Visit Info:  OT Received On: 07/07/25  General:  General  Reason for Referral: Impaired functional mobility; GI Bleed  Referred By: Sushila Alegre  Past Medical History Relevant to Rehab: DVT on Coumadin, anxiety, CHF, CKD,  dementia, diabetes mellitus type 2, nocturnal hypoxia (patient wears 1 to 2 L of oxygen at nighttime), hyperlipidemia, hypertension, hypokalemia, overactive bladder, spinal stenosis and UT  Family/Caregiver Present: No  Prior to Session Communication: Bedside nurse, PCT/NA/CTA (Pt appropriate for treatment without new limitations. Per nurse, staff only able to sit patient at bed edge.)  Patient Position Received: Bed, 3 rail up, Alarm off, not on at start of session (bed alarm not functioning)  Preferred Learning Style: auditory, kinesthetic, verbal, visual, written, Other:(comment) (Pt demonstrated word finding difficulty and significant distractability within basic tasks.)  General Comment: Pt pleasant and cooperative, demonstrated difficulty with direction following, motor planning,and problem solving activities.  Precautions:  Medical Precautions: Fall precautions (female purewick, IV and telemetry.)    Pain Assessment:  Pain Assessment  Pain Assessment: 0-10  0-10 (Numeric) Pain Score: 0 - No pain    Objective   Cognition:  Overall Cognitive Status: Impaired  Orientation Level: Disoriented to place, Disoriented to time  Problem Solving: Exceptions to WFL  Simple Functional Tasks: Impaired  Processing Speed: Delayed  Activities of Daily Living: LE Dressing  LE Dressing: Yes  Pants Level of Assistance: Dependent, Cognitive support (comment) (increased time on task required as well as direction and redirection.)  Sock Level of Assistance: Dependent  LE Dressing Where Assessed: Edge of bed  LE Dressing Comments: Unable to complete donning of pants d/t difficulty standing with Handheld assist or walker use for task.     Bed Mobility/Transfers: Bed Mobility  Bed Mobility: Yes  Bed Mobility 1  Bed Mobility 1: Rolling right, Rolling left, Supine to sitting  Level of Assistance 1: Minimum assistance  Bed Mobility Comments 1: Assist with trunk and LE's required.    Transfers  Transfer: Yes  Transfer 1  Transfer From 1:  Bed to  Transfer to 1: Sit, Stand  Technique 1: Sit to stand, Stand to sit  Transfer Device 1: Walker  Transfer Level of Assistance 1: Hand held assistance, Moderate assistance  Trials/Comments 1: Unable to come to fully erect stand despite repetitions. (x3 trials with walker, 3 trials arm in arm). Unable to advance feet in standing to complete transfer to chair.     Standing Balance:  Static Standing Balance  Static Standing-Balance Support: Bilateral upper extremity supported  Static Standing-Level of Assistance: Contact guard  Static Standing-Comment/Number of Minutes: with front wheel walker and arm in arm. Only able to complete max 60 seconds in standing. Unable to pull pants up over hips from knee level with support in standing.    Outcome Measures:  [facility] Daily Activity  Putting on and taking off regular lower body clothing: Total  Bathing (including washing, rinsing, drying): A lot  Putting on and taking off regular upper body clothing: A little  Toileting, which includes using toilet, bedpan or urinal: Total  Taking care of personal grooming such as brushing teeth: A little  Eating Meals: None  Daily Activity - Total Score: 14    Education Documentation  Body Mechanics, taught by BRETT Leach at 7/7/2025 11:55 AM.  Learner: Patient  Readiness: Acceptance  Method: Explanation, Demonstration  Response: Verbalizes Understanding, Demonstrated Understanding, Needs Reinforcement, No Evidence of Learning  Comment: Pt educated on falls precautions, effective body mechanics for mobility and self-care in session. No evidence of learning despite patient participation and compliance. Habituation and repetition indicated with continued reinforcement for uptake.    Precautions, taught by BRETT Leach at 7/7/2025 11:55 AM.  Learner: Patient  Readiness: Acceptance  Method: Explanation, Demonstration  Response: Verbalizes Understanding, Demonstrated Understanding, Needs Reinforcement, No Evidence of  Learning  Comment: Pt educated on falls precautions, effective body mechanics for mobility and self-care in session. No evidence of learning despite patient participation and compliance. Habituation and repetition indicated with continued reinforcement for uptake.    ADL Training, taught by BRETT Leach at 7/7/2025 11:55 AM.  Learner: Patient  Readiness: Acceptance  Method: Explanation, Demonstration  Response: Verbalizes Understanding, Demonstrated Understanding, Needs Reinforcement, No Evidence of Learning  Comment: Pt educated on falls precautions, effective body mechanics for mobility and self-care in session. No evidence of learning despite patient participation and compliance. Habituation and repetition indicated with continued reinforcement for uptake.    Goals:  Encounter Problems       Encounter Problems (Active)       OT Goals       Pt will complete functional t/f with <1 verbal cue distant supervision on safety and following precaution to increase safety and decrease fall risks during functional activities with LRD during functional mobility.  (Progressing)       Start:  07/04/25    Expected End:  07/18/25            Pt will demo improved activity tolerance evidenced by participation in BADL and/or functional mobility x15 mins with </=1 rest break.  (Progressing)       Start:  07/04/25    Expected End:  07/18/25            Pt will complete all ADLs (I.e., bathing, toileting, hygiene,  and LE dressing) with one vc cue and stand by assist for safety while demonstrating adequate endurance and strength without showing signs of moderate pain, SOB, and/or fatigue.  (Progressing)       Start:  07/04/25    Expected End:  07/18/25            Pt will complete BUE exercises, 10-15 reps 1-2 sets in all functional planes, to demo improved functional strength for increased participation in BADL and mobility.        Start:  07/04/25    Expected End:  07/18/25

## 2025-07-07 NOTE — PROGRESS NOTES
07/07/25 1435   Discharge Planning   Living Arrangements Other (Comment)  (Andrei at the [facility])   Support Systems Children   Assistance Needed Per facility baseline is A&0x3 confused at times, ambulates with rollator/ walker, doesn't drive, room air, HX DVT on coumadin   Type of Residence Assisted living   Do you have animals or pets at home? No   Care Facility Name [facility]   Who is requesting discharge planning? Provider   Home or Post Acute Services Post acute facilities (Rehab/SNF/etc)   Type of Post Acute Facility Services Skilled nursing   Expected Discharge Disposition SNF  (PT/OT recommending mod frequency skilled services. Patient and family agreeable to SNF and FOC is [facility] bed. Await decision from facility if they can accept. Confirmed with admissions at assisted living patient need SNF prior to return to [place].)   Does the patient need discharge transport arranged? Yes   [facility] coordination needed? Yes   Has discharge transport been arranged? No   Patient Choice   Provider Choice list and CMS website (https://medicare.gov/care-compare#search) for post-acute Quality and Resource Measure Data were provided and reviewed with: Patient;Family   Stroke Family Assessment   Stroke Family Assessment Needed No   Intensity of Service   Intensity of Service 0-30 min     7/7/25 @ 1438: Patient is not a precert and is medically ready.   7/7/25 @ 1626: [facility] unable to accept. Call to son Deshaun for new choices. Choices are 1) [facility] and 2) [facility]. Referrals sent, facilities reviewing.

## 2025-07-08 VITALS
TEMPERATURE: 97.9 F | HEIGHT: 60 IN | SYSTOLIC BLOOD PRESSURE: 119 MMHG | HEART RATE: 62 BPM | WEIGHT: 246 LBS | RESPIRATION RATE: 20 BRPM | DIASTOLIC BLOOD PRESSURE: 54 MMHG | OXYGEN SATURATION: 97 % | BODY MASS INDEX: 48.29 KG/M2

## 2025-07-08 LAB
ANION GAP SERPL CALC-SCNC: 10 MMOL/L (ref 10–20)
BUN SERPL-MCNC: 10 MG/DL (ref 6–23)
CALCIUM SERPL-MCNC: 9.5 MG/DL (ref 8.6–10.3)
CHLORIDE SERPL-SCNC: 104 MMOL/L (ref 98–107)
CO2 SERPL-SCNC: 29 MMOL/L (ref 21–32)
CREAT SERPL-MCNC: 0.7 MG/DL (ref 0.5–1.05)
EGFRCR SERPLBLD CKD-EPI 2021: 83 ML/MIN/1.73M*2
ERYTHROCYTE [DISTWIDTH] IN BLOOD BY AUTOMATED COUNT: 26 % (ref 11.5–14.5)
GLUCOSE SERPL-MCNC: 130 MG/DL (ref 74–99)
HCT VFR BLD AUTO: 33.5 % (ref 36–46)
HGB BLD-MCNC: 9.5 G/DL (ref 12–16)
MAGNESIUM SERPL-MCNC: 1.67 MG/DL (ref 1.6–2.4)
MCH RBC QN AUTO: 22.7 PG (ref 26–34)
MCHC RBC AUTO-ENTMCNC: 28.4 G/DL (ref 32–36)
MCV RBC AUTO: 80 FL (ref 80–100)
NRBC BLD-RTO: 0 /100 WBCS (ref 0–0)
PLATELET # BLD AUTO: 267 X10*3/UL (ref 150–450)
POTASSIUM SERPL-SCNC: 3.2 MMOL/L (ref 3.5–5.3)
RBC # BLD AUTO: 4.19 X10*6/UL (ref 4–5.2)
SODIUM SERPL-SCNC: 140 MMOL/L (ref 136–145)
WBC # BLD AUTO: 6.5 X10*3/UL (ref 4.4–11.3)

## 2025-07-08 PROCEDURE — 2500000002 HC RX 250 W HCPCS SELF ADMINISTERED DRUGS (ALT 637 FOR MEDICARE OP, ALT 636 FOR OP/ED): Performed by: STUDENT IN AN ORGANIZED HEALTH CARE EDUCATION/TRAINING PROGRAM

## 2025-07-08 PROCEDURE — 2500000001 HC RX 250 WO HCPCS SELF ADMINISTERED DRUGS (ALT 637 FOR MEDICARE OP)

## 2025-07-08 PROCEDURE — 80048 BASIC METABOLIC PNL TOTAL CA: CPT | Performed by: FAMILY MEDICINE

## 2025-07-08 PROCEDURE — 2500000002 HC RX 250 W HCPCS SELF ADMINISTERED DRUGS (ALT 637 FOR MEDICARE OP, ALT 636 FOR OP/ED): Performed by: FAMILY MEDICINE

## 2025-07-08 PROCEDURE — 2500000001 HC RX 250 WO HCPCS SELF ADMINISTERED DRUGS (ALT 637 FOR MEDICARE OP): Performed by: FAMILY MEDICINE

## 2025-07-08 PROCEDURE — 99239 HOSP IP/OBS DSCHRG MGMT >30: CPT | Performed by: STUDENT IN AN ORGANIZED HEALTH CARE EDUCATION/TRAINING PROGRAM

## 2025-07-08 PROCEDURE — 83735 ASSAY OF MAGNESIUM: CPT | Performed by: STUDENT IN AN ORGANIZED HEALTH CARE EDUCATION/TRAINING PROGRAM

## 2025-07-08 PROCEDURE — 2500000004 HC RX 250 GENERAL PHARMACY W/ HCPCS (ALT 636 FOR OP/ED): Performed by: FAMILY MEDICINE

## 2025-07-08 PROCEDURE — 85027 COMPLETE CBC AUTOMATED: CPT | Performed by: FAMILY MEDICINE

## 2025-07-08 PROCEDURE — 2500000004 HC RX 250 GENERAL PHARMACY W/ HCPCS (ALT 636 FOR OP/ED)

## 2025-07-08 PROCEDURE — 36415 COLL VENOUS BLD VENIPUNCTURE: CPT | Performed by: FAMILY MEDICINE

## 2025-07-08 RX ORDER — POTASSIUM CHLORIDE 20 MEQ/1
40 TABLET, EXTENDED RELEASE ORAL ONCE
Status: COMPLETED | OUTPATIENT
Start: 2025-07-08 | End: 2025-07-08

## 2025-07-08 RX ADMIN — POLYETHYLENE GLYCOL 3350 17 G: 17 POWDER, FOR SOLUTION ORAL at 11:10

## 2025-07-08 RX ADMIN — PANTOPRAZOLE SODIUM 40 MG: 40 TABLET, DELAYED RELEASE ORAL at 06:26

## 2025-07-08 RX ADMIN — CETIRIZINE HYDROCHLORIDE 10 MG: 10 TABLET, FILM COATED ORAL at 11:09

## 2025-07-08 RX ADMIN — POTASSIUM CHLORIDE 40 MEQ: 1500 TABLET, EXTENDED RELEASE ORAL at 11:10

## 2025-07-08 RX ADMIN — METOPROLOL TARTRATE 25 MG: 25 TABLET, FILM COATED ORAL at 11:14

## 2025-07-08 RX ADMIN — ENOXAPARIN SODIUM 40 MG: 100 INJECTION SUBCUTANEOUS at 11:12

## 2025-07-08 RX ADMIN — PYRIDOXINE HCL TAB 50 MG 50 MG: 50 TAB at 11:09

## 2025-07-08 RX ADMIN — FUROSEMIDE 40 MG: 40 TABLET ORAL at 11:09

## 2025-07-08 RX ADMIN — OXYBUTYNIN CHLORIDE 5 MG: 5 TABLET ORAL at 11:09

## 2025-07-08 ASSESSMENT — PAIN SCALES - GENERAL
PAINLEVEL_OUTOF10: 0 - NO PAIN
PAINLEVEL_OUTOF10: 0 - NO PAIN

## 2025-07-08 ASSESSMENT — PAIN - FUNCTIONAL ASSESSMENT
PAIN_FUNCTIONAL_ASSESSMENT: 0-10
PAIN_FUNCTIONAL_ASSESSMENT: 0-10

## 2025-07-08 NOTE — DOCUMENTATION CLARIFICATION NOTE
"    PATIENT:               BENJI NICK  ACCT #:                  [id number]  MRN:                       [id number]  :                       1935  ADMIT DATE:       2025 5:38 AM  DISCH DATE:  RESPONDING PROVIDER #:        [id number]          PROVIDER RESPONSE TEXT:    Chronic combined systolic/diastolic Congestive Heart Failure    CDI QUERY TEXT:    Clarification    Instruction:    Based on your assessment of the patient and the clinical information, please provide the requested documentation by clicking on the appropriate radio button and enter any additional information if prompted.    Question: Please further clarify the type and acuity of congestive heart failure    When answering this query, please exercise your independent professional judgment. The fact that a question is being asked, does not imply that any particular answer is desired or expected.    The patient's clinical indicators include:  Clinical Information: 89 year old female presenting with ABLA / GIB and BLE edema.    Clinical Indicators:   H/P: \"Bilateral leg edema/CHF\"     IM: \"LE Edema/HFrEF\"     IM: \"PMH of diastolic HF\"    7/3 TTE: \"1. Left ventricular ejection fraction is moderately decreased by visual estimate at 35-40 percent.  2. There is global hypokinesis of the left ventricle with minor regional variations.  3. Left ventricular cavity size is mildly dilated.  4. Abnormal septal motion consistent with left bundle branch block.  5. There is normal right ventricular global systolic function.  6. The Doppler estimated RVSP is slightly elevated at 33 mmHg\"    Treatment:  O2 therapy, Lasix 40 po qDay (-current), Lasix 20mg IV x2 (), Lasix 40mg IV x1 (7/3), repeated VS, repeated labs, supportive care.    Risk Factors: 89 year old female being treated for a GIB with BLE edema and a history of CHF.  Options provided:  -- Acute on Chronic Systolic Congestive Heart Failure  -- Chronic Systolic Congestive Heart " Failure  -- Acute on Chronic combined systolic/diastolic Congestive Heart Failure  -- Chronic combined systolic/diastolic Congestive Heart Failure  -- Other - I will add my own diagnosis  -- Refer to Clinical Documentation Reviewer    Query created by: Vanesa Lala on 7/7/2025 4:13 PM      Electronically signed by:  BLAKE AGUILAR DO 7/8/2025 11:07 AM

## 2025-07-08 NOTE — CARE PLAN
The clinical goals for the shift include pt will remain safe and hds this shift    Over the shift, the patient did  make progress toward the following goals. No barriers to progression.

## 2025-07-08 NOTE — PROGRESS NOTES
07/08/25 1145   Discharge Planning   Living Arrangements Other (Comment)  (Andrei at the [facility] assisted living)   Support Systems Children   Expected Discharge Disposition SNF  ([facility] is able to accept. No precert required. D/C orders are complete and sent to facility in [facility]. Transport confirmed for 1615 pickup. Patient, son, RN, and facility notified of d/c time. Number for N2N report provided to Melody LOPEZ.)   Does the patient need discharge transport arranged? Yes   Has discharge transport been arranged? Yes   What day is the transport expected? 07/08/25   What time is the transport expected? 1615   Patient Choice   Provider Choice list and CMS website (https://medicare.gov/care-compare#search) for post-acute Quality and Resource Measure Data were provided and reviewed with: Family;Patient   Stroke Family Assessment   Stroke Family Assessment Needed No   Intensity of Service   Intensity of Service 0-30 min

## 2025-07-08 NOTE — DISCHARGE SUMMARY
Discharge Diagnosis  GI bleed           Issues Requiring Follow-Up  Post hospital follow up    Discharge Meds     Medication List      START taking these medications     enoxaparin 40 mg/0.4 mL syringe; Commonly known as: Lovenox; Inject 0.4   mL (40 mg) under the skin once daily.   metoprolol tartrate 25 mg tablet; Commonly known as: Lopressor     CONTINUE taking these medications     acetaminophen 325 mg tablet; Commonly known as: Tylenol   calcium carbonate 500 mg (200 mg elemental) chewable tablet; Commonly   known as: Tums; Chew 1 tablet (500 mg) 4 times a day as needed for   indigestion or heartburn (first line).   fluticasone 50 mcg/actuation nasal spray; Commonly known as: Flonase   furosemide 40 mg tablet; Commonly known as: Lasix   ibuprofen 200 mg tablet   loratadine 10 mg tablet; Commonly known as: Claritin   oxyBUTYnin 5 mg tablet; Commonly known as: Ditropan   pantoprazole 40 mg EC tablet; Commonly known as: ProtoNix; Take 1 tablet   (40 mg) by mouth once daily in the morning. Take before meals. Do not   crush, chew, or split.   potassium chloride CR 20 mEq ER tablet; Commonly known as: Klor-Con M20   pyridoxine 50 mg tablet; Commonly known as: Vitamin B-6   sennosides-docusate sodium 8.6-50 mg tablet; Commonly known as:   Savita-Colace; Take 2 tablets by mouth once daily.     STOP taking these medications     apixaban 5 mg tablet; Commonly known as: Eliquis   enalapril 20 mg tablet; Commonly known as: Vasotec   nitrofurantoin (macrocrystal-monohydrate) 100 mg capsule; Commonly known   as: Macrobid   warfarin 5 mg tablet; Commonly known as: Coumadin     ASK your doctor about these medications     lactulose 20 gram/30 mL oral solution; Take 30 mL (20 g) by mouth once   daily.       Test Results Pending At Discharge  Pending Labs       No current pending labs.            Hospital Course  Vanessa Adame is a 89 y.o. female with a past medical history of DVT on Coumadin, CHF, CKD, dementia, diabetes  mellitus type 2,  overactive bladder, spinal stenosis and UTI who initially presented with weakness and admitted for an acutely worsening anemia with a supratherapeutic INR of 7.2 and hemoglobin of 4.5. Throughout her hospital stay, patient received four units of packed red blood cells, 5 mg of oral vitamin K and one unit of fresh frozen plasma. Patient's INR was closely monitored and gradually brought down. It was recommended against restarting patient's warfarin therapy as her previous DVT was provoked in the setting of a prolonged hospital stay for which patient completed 5 months of therapy.  DVT ultrasound study was done and negative now for thrombus.  Patient's warfarin will be discontinued at discharge, and will be sent to a SNF on Lovenox for DVT prophylaxis.  In addition, patient's blood pressure was initially hypotensive in the setting of her acute anemia, and enalapril was held and dc. Pt is hemodynamically stable for discharge  to SNF at this time with close outpatient follow ups.     The patient was discharged in satisfactory condition.   Medications and side effect profile reviewed with patient.  More than 30 minutes were spent in coordinating patient discharge     Pertinent Physical Exam At Time of Discharge  Physical Exam  Vitals and nursing note reviewed.   Constitutional:       General: She is not in acute distress.     Appearance: Normal appearance. She is not ill-appearing or toxic-appearing.   HENT:      Head: Normocephalic and atraumatic.      Nose: Nose normal.      Mouth/Throat:      Mouth: Mucous membranes are moist.   Eyes:      Extraocular Movements: Extraocular movements intact.      Conjunctiva/sclera: Conjunctivae normal.      Pupils: Pupils are equal, round, and reactive to light.   Cardiovascular:      Rate and Rhythm: Normal rate and regular rhythm.      Heart sounds: No murmur heard.     No gallop.   Pulmonary:      Effort: Pulmonary effort is normal. No respiratory distress.       Breath sounds: Normal breath sounds. No wheezing, rhonchi or rales.   Abdominal:      General: Abdomen is flat. Bowel sounds are normal. There is no distension.      Palpations: Abdomen is soft. There is no mass.      Tenderness: There is no abdominal tenderness.   Musculoskeletal:         General: No swelling or tenderness. Normal range of motion.      Cervical back: Normal range of motion and neck supple.   Skin:     General: Skin is warm and dry.   Neurological:      Mental Status: She is alert and oriented to person, place, and time.      Motor: Weakness present.   Psychiatric:         Mood and Affect: Mood normal.         Behavior: Behavior normal.         Thought Content: Thought content normal.         Judgment: Judgment normal.         Outpatient Follow-Up  No future appointments.      Marzena Cuenca MD

## 2025-07-14 DIAGNOSIS — K92.2 GI BLEED: ICD-10-CM

## 2025-07-14 DIAGNOSIS — D62 ACUTE BLOOD LOSS ANEMIA: ICD-10-CM

## 2025-07-15 ENCOUNTER — NURSING HOME VISIT (OUTPATIENT)
Dept: POST ACUTE CARE | Facility: EXTERNAL LOCATION | Age: 89
End: 2025-07-15
Payer: MEDICARE

## 2025-07-15 DIAGNOSIS — Z79.899 ON DEEP VEIN THROMBOSIS (DVT) PROPHYLAXIS: ICD-10-CM

## 2025-07-15 DIAGNOSIS — K92.2 GASTROINTESTINAL HEMORRHAGE, UNSPECIFIED GASTROINTESTINAL HEMORRHAGE TYPE: ICD-10-CM

## 2025-07-15 DIAGNOSIS — N32.81 OAB (OVERACTIVE BLADDER): ICD-10-CM

## 2025-07-15 DIAGNOSIS — I50.32 CHRONIC DIASTOLIC HEART FAILURE: ICD-10-CM

## 2025-07-15 DIAGNOSIS — R53.81 PHYSICAL DECONDITIONING: ICD-10-CM

## 2025-07-15 DIAGNOSIS — K59.00 CONSTIPATION, UNSPECIFIED CONSTIPATION TYPE: ICD-10-CM

## 2025-07-15 DIAGNOSIS — I10 BENIGN ESSENTIAL HTN: Primary | ICD-10-CM

## 2025-07-15 DIAGNOSIS — R52 PAIN: ICD-10-CM

## 2025-07-15 DIAGNOSIS — Z86.718 HISTORY OF DVT (DEEP VEIN THROMBOSIS): ICD-10-CM

## 2025-07-15 DIAGNOSIS — J30.9 ALLERGIC RHINITIS, UNSPECIFIED SEASONALITY, UNSPECIFIED TRIGGER: ICD-10-CM

## 2025-07-15 PROCEDURE — 99309 SBSQ NF CARE MODERATE MDM 30: CPT | Performed by: NURSE PRACTITIONER

## 2025-07-15 NOTE — LETTER
Patient: Vanessa Adame  : 1935    Encounter Date: 07/15/2025    Patient ID: Vanessa Adame is a 89 y.o. female who is acute skilled care being seen and evaluated for multiple medical problems.  [id number]   1935    /74   Pulse 84   Temp 36.7 °C (98 °F)   Resp 15   SpO2 97%     Assessment/Plan  Problem List Items Addressed This Visit       Benign essential HTN - Primary    Metoprolol tartrate 25 mg by mouth twice daily           OAB (overactive bladder)    Oxybutynin 5 mg by mouth twice daily         Chronic diastolic heart failure    Lasix 40 mg by mouth daily  Potassium 20 mEq by mouth twice daily  2025 BUN/creatinine 12/0.7, potassium 3.3, hemoglobin 9.4  2025 repeat BMP and CBC with differential           GI bleed    Recent Hospitalization  She received vitamin K  A total of 4 units PRBC transfused  1 unit of FFP transfused  2025 hemoglobin 9.4           History of DVT (deep vein thrombosis)    Per documentation-5 months of anticoagulation completed  Coumadin discontinued last hospitalization  Supratherapeutic INR-7.2  She is not currently prescribed Eliquis or Coumadin.         Allergic rhinitis    Loratadine 10 mg by mouth daily  Fluticasone 50 mcg 1 spray bilateral nares daily as needed         Pain    Acetaminophen 650 mg by mouth every 6 hours as needed         Constipation    Senna 8.6 mg - 2 tablets by mouth daily  Lactulose 30 mL by mouth daily         Physical deconditioning    PT/OT         On deep vein thrombosis (DVT) prophylaxis    Lovenox 40 mg subcu daily  Stop date 2025              HPI: Client was admitted at [facility] from 2025 until 2025 with a final diagnosis of GI bleed and supratherapeutic INR.  ED hemoglobin-4.5.  INR 7.2.  She was receiving Coumadin for a history of DVT.  BLE venous ultrasound negative for DVT.  Anticoagulant discontinued.  Per documentation she had received 5 months anticoagulation.  She received a  total of 4 units of PRBC, vitamin K, and 1 unit of FFP.  GI consulted and she received PPI.  No EGD while hospitalized.  She was discharged to this facility with Lovenox for DVT prophylaxis.  Client is receiving PT/OT services.  She denies headache or dizziness.  She denies chest pain or shortness of breath.  Room air.  No current complaints of abdominal pain, nausea, vomiting, or diarrhea.  She denies dysuria.  No reported change in appetite.  She has no current complaints of pain.  Staff report that she had an episode of epistaxis this a.m. which has resolved.     Note completed with [place].  Please excuse any grammatical errors.    Review of Systems completed with client and staff    Physical Exam  Constitutional:       Comments: Sitting in chair   HENT:      Mouth/Throat:      Mouth: Mucous membranes are moist.     Cardiovascular:      Rate and Rhythm: Normal rate.   Pulmonary:      Effort: Pulmonary effort is normal.      Breath sounds: Normal breath sounds.      Comments: Room air  Abdominal:      General: Bowel sounds are normal.      Comments: Bowel sounds positive   Genitourinary:     Comments: Denies dysuria    Musculoskeletal:      Comments: PT/OT  +1 BLE edema     Skin:     General: Skin is warm and dry.     Neurological:      Mental Status: She is alert. Mental status is at baseline.     Psychiatric:      Comments: Cooperative with assessment  Conversational                   Electronically Signed By: MARTA Chand   7/17/25  6:21 AM

## 2025-07-17 VITALS
HEART RATE: 84 BPM | OXYGEN SATURATION: 97 % | TEMPERATURE: 98 F | SYSTOLIC BLOOD PRESSURE: 133 MMHG | DIASTOLIC BLOOD PRESSURE: 74 MMHG | RESPIRATION RATE: 15 BRPM

## 2025-07-17 PROBLEM — J30.9 ALLERGIC RHINITIS: Status: ACTIVE | Noted: 2025-07-17

## 2025-07-17 PROBLEM — K59.00 CONSTIPATION: Status: ACTIVE | Noted: 2025-07-17

## 2025-07-17 PROBLEM — Z79.899 ON DEEP VEIN THROMBOSIS (DVT) PROPHYLAXIS: Status: ACTIVE | Noted: 2025-07-17

## 2025-07-17 PROBLEM — Z86.718 HISTORY OF DVT (DEEP VEIN THROMBOSIS): Status: ACTIVE | Noted: 2025-07-17

## 2025-07-17 PROBLEM — R52 PAIN: Status: ACTIVE | Noted: 2025-07-17

## 2025-07-17 PROBLEM — R53.81 PHYSICAL DECONDITIONING: Status: ACTIVE | Noted: 2025-07-17

## 2025-07-17 PROBLEM — I50.9 CHF (CONGESTIVE HEART FAILURE): Status: RESOLVED | Noted: 2025-02-11 | Resolved: 2025-07-17

## 2025-07-17 PROBLEM — I82.451 ACUTE DEEP VEIN THROMBOSIS (DVT) OF RIGHT PERONEAL VEIN (MULTI): Status: RESOLVED | Noted: 2025-02-16 | Resolved: 2025-07-17

## 2025-07-17 NOTE — ASSESSMENT & PLAN NOTE
Per documentation-5 months of anticoagulation completed  Coumadin discontinued last hospitalization  Supratherapeutic INR-7.2  She is not currently prescribed Eliquis or Coumadin.

## 2025-07-17 NOTE — ASSESSMENT & PLAN NOTE
Recent Hospitalization  She received vitamin K  A total of 4 units PRBC transfused  1 unit of FFP transfused  7/9/2025 hemoglobin 9.4

## 2025-07-17 NOTE — PROGRESS NOTES
Patient ID: Vanessa Adame is a 89 y.o. female who is acute skilled care being seen and evaluated for multiple medical problems.  [id number]   1935    /74   Pulse 84   Temp 36.7 °C (98 °F)   Resp 15   SpO2 97%     Assessment/Plan  Problem List Items Addressed This Visit       Benign essential HTN - Primary    Metoprolol tartrate 25 mg by mouth twice daily           OAB (overactive bladder)    Oxybutynin 5 mg by mouth twice daily         Chronic diastolic heart failure    Lasix 40 mg by mouth daily  Potassium 20 mEq by mouth twice daily  7/9/2025 BUN/creatinine 12/0.7, potassium 3.3, hemoglobin 9.4  7/21/2025 repeat BMP and CBC with differential           GI bleed    Recent Hospitalization  She received vitamin K  A total of 4 units PRBC transfused  1 unit of FFP transfused  7/9/2025 hemoglobin 9.4           History of DVT (deep vein thrombosis)    Per documentation-5 months of anticoagulation completed  Coumadin discontinued last hospitalization  Supratherapeutic INR-7.2  She is not currently prescribed Eliquis or Coumadin.         Allergic rhinitis    Loratadine 10 mg by mouth daily  Fluticasone 50 mcg 1 spray bilateral nares daily as needed         Pain    Acetaminophen 650 mg by mouth every 6 hours as needed         Constipation    Senna 8.6 mg - 2 tablets by mouth daily  Lactulose 30 mL by mouth daily         Physical deconditioning    PT/OT         On deep vein thrombosis (DVT) prophylaxis    Lovenox 40 mg subcu daily  Stop date 7/23/2025              HPI: Client was admitted at [facility] from 7/1/2025 until 7/8/2025 with a final diagnosis of GI bleed and supratherapeutic INR.  ED hemoglobin-4.5.  INR 7.2.  She was receiving Coumadin for a history of DVT.  BLE venous ultrasound negative for DVT.  Anticoagulant discontinued.  Per documentation she had received 5 months anticoagulation.  She received a total of 4 units of PRBC, vitamin K, and 1 unit of FFP.  GI consulted and she  received PPI.  No EGD while hospitalized.  She was discharged to this facility with Lovenox for DVT prophylaxis.  Client is receiving PT/OT services.  She denies headache or dizziness.  She denies chest pain or shortness of breath.  Room air.  No current complaints of abdominal pain, nausea, vomiting, or diarrhea.  She denies dysuria.  No reported change in appetite.  She has no current complaints of pain.  Staff report that she had an episode of epistaxis this a.m. which has resolved.     Note completed with Francisco J.  Please excuse any grammatical errors.    Review of Systems completed with client and staff    Physical Exam  Constitutional:       Comments: Sitting in chair   HENT:      Mouth/Throat:      Mouth: Mucous membranes are moist.     Cardiovascular:      Rate and Rhythm: Normal rate.   Pulmonary:      Effort: Pulmonary effort is normal.      Breath sounds: Normal breath sounds.      Comments: Room air  Abdominal:      General: Bowel sounds are normal.      Comments: Bowel sounds positive   Genitourinary:     Comments: Denies dysuria    Musculoskeletal:      Comments: PT/OT  +1 BLE edema     Skin:     General: Skin is warm and dry.     Neurological:      Mental Status: She is alert. Mental status is at baseline.     Psychiatric:      Comments: Cooperative with assessment  Conversational

## 2025-07-17 NOTE — ASSESSMENT & PLAN NOTE
Lasix 40 mg by mouth daily  Potassium 20 mEq by mouth twice daily  7/9/2025 BUN/creatinine 12/0.7, potassium 3.3, hemoglobin 9.4  7/21/2025 repeat BMP and CBC with differential     Yes

## 2025-07-19 LAB
ATRIAL RATE: 81 BPM
P AXIS: 73 DEGREES
P OFFSET: 169 MS
P ONSET: 98 MS
PR INTERVAL: 228 MS
Q ONSET: 212 MS
QRS COUNT: 13 BEATS
QRS DURATION: 166 MS
QT INTERVAL: 430 MS
QTC CALCULATION(BAZETT): 499 MS
QTC FREDERICIA: 475 MS
R AXIS: -48 DEGREES
T AXIS: 92 DEGREES
T OFFSET: 427 MS
VENTRICULAR RATE: 81 BPM

## 2025-07-22 ENCOUNTER — NURSING HOME VISIT (OUTPATIENT)
Dept: POST ACUTE CARE | Facility: EXTERNAL LOCATION | Age: 89
End: 2025-07-22
Payer: MEDICARE

## 2025-07-22 DIAGNOSIS — I50.32 CHRONIC DIASTOLIC HEART FAILURE: ICD-10-CM

## 2025-07-22 DIAGNOSIS — K92.2 GASTROINTESTINAL HEMORRHAGE, UNSPECIFIED GASTROINTESTINAL HEMORRHAGE TYPE: ICD-10-CM

## 2025-07-22 DIAGNOSIS — N32.81 OAB (OVERACTIVE BLADDER): ICD-10-CM

## 2025-07-22 DIAGNOSIS — I10 BENIGN ESSENTIAL HTN: Primary | ICD-10-CM

## 2025-07-22 DIAGNOSIS — R53.81 PHYSICAL DECONDITIONING: ICD-10-CM

## 2025-07-22 PROCEDURE — 99308 SBSQ NF CARE LOW MDM 20: CPT | Performed by: NURSE PRACTITIONER

## 2025-07-22 NOTE — LETTER
Patient: Vanessa Adame  : 1935    Encounter Date: 2025    Patient ID: Vanessa Adame is a 89 y.o. female who is acute skilled care being seen and evaluated for multiple medical problems.  [id number]   1935    /81   Pulse 87   Temp 36.7 °C (98.1 °F)   Resp 15   SpO2 97%     Assessment/Plan  Problem List Items Addressed This Visit       Benign essential HTN - Primary    Metoprolol tartrate 25 mg by mouth twice daily            OAB (overactive bladder)    Oxybutynin 5 mg by mouth twice daily         Chronic diastolic heart failure    Lasix 40 mg by mouth daily  Potassium 20 mEq by mouth twice daily  2025 BUN/creatinine 12/0.7, potassium 3.3, hemoglobin 9.4  2025 BUN/creatinine 16/0.8, potassium 4.4, hemoglobin 11.6         GI bleed    Recent Hospitalization  She received vitamin K  A total of 4 units PRBC transfused  1 unit of FFP transfused  2025 hemoglobin 9.4  2025 hemoglobin 11.6         Physical deconditioning    PT/OT              HPI: Client continues to receive PT/OT services.  Recent GI bleed.  Staff report no signs of hematuria, melena, or further episodes of epistaxis. Client denies headache or dizziness.  She denies chest pain or shortness of breath.  Room air.  She denies abdominal pain, nausea, vomiting, or diarrhea.  No change in appetite.  Denies dysuria.  No complaints of insomnia.  She has no current complaints of pain.    Note completed with [employer] [employer] in [place].  Please excuse any grammatical errors.    Review of Systems completed with staff and client input    Physical Exam  Constitutional:       Comments: Sitting in recliner   HENT:      Mouth/Throat:      Mouth: Mucous membranes are moist.     Cardiovascular:      Rate and Rhythm: Normal rate.   Pulmonary:      Effort: Pulmonary effort is normal.      Comments: Room air  No tachypnea  Abdominal:      General: Bowel sounds are normal.   Genitourinary:     Comments: Denies dysuria    Musculoskeletal:       Comments: PT/OT     Skin:     General: Skin is warm and dry.     Neurological:      Mental Status: She is alert. Mental status is at baseline.     Psychiatric:         Mood and Affect: Mood normal.                   Electronically Signed By: MARTA Chand   7/23/25  7:34 AM

## 2025-07-23 VITALS
RESPIRATION RATE: 15 BRPM | SYSTOLIC BLOOD PRESSURE: 127 MMHG | OXYGEN SATURATION: 97 % | TEMPERATURE: 98.1 F | DIASTOLIC BLOOD PRESSURE: 81 MMHG | HEART RATE: 87 BPM

## 2025-07-23 NOTE — ASSESSMENT & PLAN NOTE
Recent Hospitalization  She received vitamin K  A total of 4 units PRBC transfused  1 unit of FFP transfused  7/9/2025 hemoglobin 9.4  7/21/2025 hemoglobin 11.6

## 2025-07-23 NOTE — PROGRESS NOTES
Patient ID: Vanessa Adame is a 89 y.o. female who is acute skilled care being seen and evaluated for multiple medical problems.  [id number]   1935    /81   Pulse 87   Temp 36.7 °C (98.1 °F)   Resp 15   SpO2 97%     Assessment/Plan  Problem List Items Addressed This Visit       Benign essential HTN - Primary    Metoprolol tartrate 25 mg by mouth twice daily            OAB (overactive bladder)    Oxybutynin 5 mg by mouth twice daily         Chronic diastolic heart failure    Lasix 40 mg by mouth daily  Potassium 20 mEq by mouth twice daily  7/9/2025 BUN/creatinine 12/0.7, potassium 3.3, hemoglobin 9.4  7/21/2025 BUN/creatinine 16/0.8, potassium 4.4, hemoglobin 11.6         GI bleed    Recent Hospitalization  She received vitamin K  A total of 4 units PRBC transfused  1 unit of FFP transfused  7/9/2025 hemoglobin 9.4  7/21/2025 hemoglobin 11.6         Physical deconditioning    PT/OT              HPI: Client continues to receive PT/OT services.  Recent GI bleed.  Staff report no signs of hematuria, melena, or further episodes of epistaxis. Client denies headache or dizziness.  She denies chest pain or shortness of breath.  Room air.  She denies abdominal pain, nausea, vomiting, or diarrhea.  No change in appetite.  Denies dysuria.  No complaints of insomnia.  She has no current complaints of pain.    Note completed with Francisco J.  Please excuse any grammatical errors.    Review of Systems completed with staff and client input    Physical Exam  Constitutional:       Comments: Sitting in recliner   HENT:      Mouth/Throat:      Mouth: Mucous membranes are moist.     Cardiovascular:      Rate and Rhythm: Normal rate.   Pulmonary:      Effort: Pulmonary effort is normal.      Comments: Room air  No tachypnea  Abdominal:      General: Bowel sounds are normal.   Genitourinary:     Comments: Denies dysuria    Musculoskeletal:      Comments: PT/OT     Skin:     General: Skin is warm and dry.     Neurological:       Mental Status: She is alert. Mental status is at baseline.     Psychiatric:         Mood and Affect: Mood normal.

## 2025-07-23 NOTE — ASSESSMENT & PLAN NOTE
Lasix 40 mg by mouth daily  Potassium 20 mEq by mouth twice daily  7/9/2025 BUN/creatinine 12/0.7, potassium 3.3, hemoglobin 9.4  7/21/2025 BUN/creatinine 16/0.8, potassium 4.4, hemoglobin 11.6

## 2025-07-29 ENCOUNTER — NURSING HOME VISIT (OUTPATIENT)
Dept: POST ACUTE CARE | Facility: EXTERNAL LOCATION | Age: 89
End: 2025-07-29
Payer: MEDICARE

## 2025-07-29 DIAGNOSIS — K92.2 GASTROINTESTINAL HEMORRHAGE, UNSPECIFIED GASTROINTESTINAL HEMORRHAGE TYPE: ICD-10-CM

## 2025-07-29 DIAGNOSIS — Z86.718 HISTORY OF DVT (DEEP VEIN THROMBOSIS): ICD-10-CM

## 2025-07-29 DIAGNOSIS — R53.81 PHYSICAL DECONDITIONING: ICD-10-CM

## 2025-07-29 DIAGNOSIS — I50.32 CHRONIC DIASTOLIC HEART FAILURE: ICD-10-CM

## 2025-07-29 DIAGNOSIS — I10 BENIGN ESSENTIAL HTN: Primary | ICD-10-CM

## 2025-07-29 DIAGNOSIS — R63.4 WEIGHT LOSS: ICD-10-CM

## 2025-07-29 PROCEDURE — 99308 SBSQ NF CARE LOW MDM 20: CPT | Performed by: NURSE PRACTITIONER

## 2025-07-29 NOTE — LETTER
Patient: Vanessa Adame  : 1935    Encounter Date: 2025    Patient ID: Vanessa Adame is a 89 y.o. female who is acute skilled care being seen and evaluated for multiple medical problems.  [id number]   1935    /64   Pulse 66   Temp 36.6 °C (97.8 °F)   Resp 15   Wt 64.3 kg (141 lb 12.8 oz)   SpO2 98%   BMI 27.69 kg/m²     Assessment/Plan  Problem List Items Addressed This Visit       Benign essential HTN - Primary    Metoprolol tartrate 25 mg by mouth twice daily            Chronic diastolic heart failure    Lasix 40 mg by mouth daily  Potassium 20 mEq by mouth twice daily  2025 BUN/creatinine 12/0.7, potassium 3.3, hemoglobin 9.4  2025 BUN/creatinine 11/0.9, potassium 4.1, hemoglobin 11.8         GI bleed    Recent Hospitalization  She received vitamin K  A total of 4 units PRBC transfused  1 unit of FFP transfused  2025 hemoglobin 9.4  2025 hemoglobin 11.8  Pantoprazole 40 mg by mouth daily           History of DVT (deep vein thrombosis)    Per documentation-5 months of anticoagulation completed  Coumadin discontinued last hospitalization  Supratherapeutic INR-7.2  She is not currently prescribed Eliquis or Coumadin.         Physical deconditioning    PT/OT         Weight loss    Dietitian  House supplement  Frozen nutritional supplement  Mirtazapine 7.5 mg by mouth nightly  Repeat BMP and CBC with differential                HPI: Client continues to receive PT/OT services.  Staff report concern that she has a decreased appetite.  Weight loss.  Client denies headache or dizziness.  She denies chest pain or shortness of breath.  Room air.  She denies current abdominal pain, nausea, vomiting, diarrhea, or constipation.  She states her appetite is fair.  She denies heartburn.  She denies dysuria.  No complaints of insomnia.  She has no current complaints of pain.  Recent urine culture negative.  Will add Mirtazapine and get labs in the a.m.    Note completed  with [place].  Please excuse any grammatical errors.    Review of Systems note completed with client and staff input    Physical Exam  Constitutional:       Comments: Sitting in chair visiting with family   HENT:      Mouth/Throat:      Mouth: Mucous membranes are moist.     Cardiovascular:      Rate and Rhythm: Normal rate.   Pulmonary:      Effort: Pulmonary effort is normal.      Breath sounds: Normal breath sounds.      Comments: Room air  Abdominal:      General: Bowel sounds are normal.   Genitourinary:     Comments: Denies dysuria    Musculoskeletal:      Comments: PT/OT  RLE slight edema  LLE +1 edema     Skin:     General: Skin is warm and dry.     Neurological:      Mental Status: She is alert. Mental status is at baseline.     Psychiatric:         Mood and Affect: Mood normal.      Comments: Cooperative with assessment  No current signs of heightened anxiety                   Electronically Signed By: MARTA Chand   8/1/25  7:01 AM

## 2025-08-01 VITALS
RESPIRATION RATE: 15 BRPM | HEART RATE: 66 BPM | TEMPERATURE: 97.8 F | SYSTOLIC BLOOD PRESSURE: 108 MMHG | OXYGEN SATURATION: 98 % | WEIGHT: 141.8 LBS | DIASTOLIC BLOOD PRESSURE: 64 MMHG | BODY MASS INDEX: 27.69 KG/M2

## 2025-08-01 PROBLEM — R63.4 WEIGHT LOSS: Status: ACTIVE | Noted: 2025-08-01

## 2025-08-01 NOTE — ASSESSMENT & PLAN NOTE
Lasix 40 mg by mouth daily  Potassium 20 mEq by mouth twice daily  7/9/2025 BUN/creatinine 12/0.7, potassium 3.3, hemoglobin 9.4  7/23/2025 BUN/creatinine 11/0.9, potassium 4.1, hemoglobin 11.8

## 2025-08-01 NOTE — PROGRESS NOTES
Patient ID: Vanessa Adame is a 89 y.o. female who is acute skilled care being seen and evaluated for multiple medical problems.  [id number]   1935    /64   Pulse 66   Temp 36.6 °C (97.8 °F)   Resp 15   Wt 64.3 kg (141 lb 12.8 oz)   SpO2 98%   BMI 27.69 kg/m²     Assessment/Plan  Problem List Items Addressed This Visit       Benign essential HTN - Primary    Metoprolol tartrate 25 mg by mouth twice daily            Chronic diastolic heart failure    Lasix 40 mg by mouth daily  Potassium 20 mEq by mouth twice daily  7/9/2025 BUN/creatinine 12/0.7, potassium 3.3, hemoglobin 9.4  7/23/2025 BUN/creatinine 11/0.9, potassium 4.1, hemoglobin 11.8         GI bleed    Recent Hospitalization  She received vitamin K  A total of 4 units PRBC transfused  1 unit of FFP transfused  7/9/2025 hemoglobin 9.4  7/23/2025 hemoglobin 11.8  Pantoprazole 40 mg by mouth daily           History of DVT (deep vein thrombosis)    Per documentation-5 months of anticoagulation completed  Coumadin discontinued last hospitalization  Supratherapeutic INR-7.2  She is not currently prescribed Eliquis or Coumadin.         Physical deconditioning    PT/OT         Weight loss    Dietitian  House supplement  Frozen nutritional supplement  Mirtazapine 7.5 mg by mouth nightly  Repeat BMP and CBC with differential                HPI: Client continues to receive PT/OT services.  Staff report concern that she has a decreased appetite.  Weight loss.  Client denies headache or dizziness.  She denies chest pain or shortness of breath.  Room air.  She denies current abdominal pain, nausea, vomiting, diarrhea, or constipation.  She states her appetite is fair.  She denies heartburn.  She denies dysuria.  No complaints of insomnia.  She has no current complaints of pain.  Recent urine culture negative.  Will add Mirtazapine and get labs in the a.m.    Note completed with Francisco J.  Please excuse any grammatical errors.    Review of Systems note  completed with client and staff input    Physical Exam  Constitutional:       Comments: Sitting in chair visiting with family   HENT:      Mouth/Throat:      Mouth: Mucous membranes are moist.     Cardiovascular:      Rate and Rhythm: Normal rate.   Pulmonary:      Effort: Pulmonary effort is normal.      Breath sounds: Normal breath sounds.      Comments: Room air  Abdominal:      General: Bowel sounds are normal.   Genitourinary:     Comments: Denies dysuria    Musculoskeletal:      Comments: PT/OT  RLE slight edema  LLE +1 edema     Skin:     General: Skin is warm and dry.     Neurological:      Mental Status: She is alert. Mental status is at baseline.     Psychiatric:         Mood and Affect: Mood normal.      Comments: Cooperative with assessment  No current signs of heightened anxiety

## 2025-08-01 NOTE — ASSESSMENT & PLAN NOTE
Dietitian  House supplement  Frozen nutritional supplement  Mirtazapine 7.5 mg by mouth nightly  Repeat BMP and CBC with differential

## 2025-08-01 NOTE — ASSESSMENT & PLAN NOTE
Recent Hospitalization  She received vitamin K  A total of 4 units PRBC transfused  1 unit of FFP transfused  7/9/2025 hemoglobin 9.4  7/23/2025 hemoglobin 11.8  Pantoprazole 40 mg by mouth daily

## 2025-08-05 ENCOUNTER — NURSING HOME VISIT (OUTPATIENT)
Dept: POST ACUTE CARE | Facility: EXTERNAL LOCATION | Age: 89
End: 2025-08-05
Payer: MEDICARE

## 2025-08-05 DIAGNOSIS — K92.2 GASTROINTESTINAL HEMORRHAGE, UNSPECIFIED GASTROINTESTINAL HEMORRHAGE TYPE: ICD-10-CM

## 2025-08-05 DIAGNOSIS — I50.32 CHRONIC DIASTOLIC HEART FAILURE: ICD-10-CM

## 2025-08-05 DIAGNOSIS — I10 BENIGN ESSENTIAL HTN: Primary | ICD-10-CM

## 2025-08-05 DIAGNOSIS — N32.81 OAB (OVERACTIVE BLADDER): ICD-10-CM

## 2025-08-05 DIAGNOSIS — R52 PAIN: ICD-10-CM

## 2025-08-05 DIAGNOSIS — R63.4 WEIGHT LOSS: ICD-10-CM

## 2025-08-05 DIAGNOSIS — R53.81 PHYSICAL DECONDITIONING: ICD-10-CM

## 2025-08-05 PROCEDURE — 99308 SBSQ NF CARE LOW MDM 20: CPT | Performed by: NURSE PRACTITIONER

## 2025-08-05 NOTE — LETTER
Patient: Vanessa Adame  : 1935    Encounter Date: 2025    Patient ID: Vanessa Adame is a 89 y.o. female who is acute skilled care being seen and evaluated for multiple medical problems.  [id number]   1935    /78   Pulse 87   Temp 37 °C (98.6 °F)   Resp 15   Wt 64 kg (141 lb)   SpO2 98%   BMI 27.54 kg/m²     Assessment/Plan  Problem List Items Addressed This Visit       Benign essential HTN - Primary    Metoprolol tartrate 25 mg by mouth twice daily            OAB (overactive bladder)    Oxybutynin 5 mg by mouth twice daily  Repeat UA/culture         Chronic diastolic heart failure    Lasix 40 mg by mouth daily  Potassium 20 mEq by mouth twice daily  2025 BUN/creatinine 12/0.7, potassium 3.3, hemoglobin 9.4  2025 BUN/creatinine 11/0.9, potassium 4.1, hemoglobin 11.8  Repeat BMP and CBC with differential in the a.m. (poor appetite)         GI bleed    Recent Hospitalization  She received vitamin K  A total of 4 units PRBC transfused  1 unit of FFP transfused  Contact  GI-need EGD report  2025 hemoglobin 9.4  2025 hemoglobin 11.8  Pantoprazole 40 mg by mouth daily            Pain    Acetaminophen 650 mg by mouth every 6 hours as needed         Physical deconditioning    PT/OT         Weight loss    Dietitian  House supplement  Frozen nutritional supplement  Mirtazapine 7.5 mg by mouth nightly                   HPI: Client continues to receive PT/OT services.  Staff report client continues to have a decreased appetite.  Will repeat labs and UA/culture.  Client denies headache or dizziness.  Denies chest pain or shortness of breath.  Room air.  She denies abdominal pain, nausea, vomiting, or diarrhea.  She states that her appetite is fair, however staff report it has decreased.  She denies dysuria.  She denies insomnia.  She has no current complaints of pain.    Note completed with Francisco J.  Please excuse any grammatical errors.    Review of Systems ROS as  described in HPI     Physical Exam  Constitutional:       Comments: Sitting in wheelchair   HENT:      Mouth/Throat:      Mouth: Mucous membranes are moist.     Cardiovascular:      Rate and Rhythm: Normal rate.   Pulmonary:      Effort: Pulmonary effort is normal.      Breath sounds: Normal breath sounds.      Comments: Room air  Abdominal:      General: Bowel sounds are normal.   Genitourinary:     Comments: Denies dysuria    Musculoskeletal:      Comments: PT/OT  Wheelchair transfers     Skin:     General: Skin is warm and dry.     Neurological:      Mental Status: She is alert. Mental status is at baseline.     Psychiatric:         Mood and Affect: Mood normal.      Comments: Cooperative with assessment  Follows direction  No current signs of heightened anxiety                   Electronically Signed By: MARTA Chand   8/8/25  7:05 AM

## 2025-08-08 VITALS
OXYGEN SATURATION: 98 % | BODY MASS INDEX: 27.54 KG/M2 | TEMPERATURE: 98.6 F | RESPIRATION RATE: 15 BRPM | DIASTOLIC BLOOD PRESSURE: 78 MMHG | HEART RATE: 87 BPM | WEIGHT: 141 LBS | SYSTOLIC BLOOD PRESSURE: 122 MMHG

## 2025-08-08 NOTE — ASSESSMENT & PLAN NOTE
Lasix 40 mg by mouth daily  Potassium 20 mEq by mouth twice daily  7/9/2025 BUN/creatinine 12/0.7, potassium 3.3, hemoglobin 9.4  7/23/2025 BUN/creatinine 11/0.9, potassium 4.1, hemoglobin 11.8  Repeat BMP and CBC with differential in the a.m. (poor appetite)

## 2025-08-08 NOTE — PROGRESS NOTES
Patient ID: Vanessa Adame is a 89 y.o. female who is acute skilled care being seen and evaluated for multiple medical problems.  [id number]   1935    /78   Pulse 87   Temp 37 °C (98.6 °F)   Resp 15   Wt 64 kg (141 lb)   SpO2 98%   BMI 27.54 kg/m²     Assessment/Plan  Problem List Items Addressed This Visit       Benign essential HTN - Primary    Metoprolol tartrate 25 mg by mouth twice daily            OAB (overactive bladder)    Oxybutynin 5 mg by mouth twice daily  Repeat UA/culture         Chronic diastolic heart failure    Lasix 40 mg by mouth daily  Potassium 20 mEq by mouth twice daily  7/9/2025 BUN/creatinine 12/0.7, potassium 3.3, hemoglobin 9.4  7/23/2025 BUN/creatinine 11/0.9, potassium 4.1, hemoglobin 11.8  Repeat BMP and CBC with differential in the a.m. (poor appetite)         GI bleed    Recent Hospitalization  She received vitamin K  A total of 4 units PRBC transfused  1 unit of FFP transfused  Contact  GI-need EGD report  7/9/2025 hemoglobin 9.4  7/23/2025 hemoglobin 11.8  Pantoprazole 40 mg by mouth daily            Pain    Acetaminophen 650 mg by mouth every 6 hours as needed         Physical deconditioning    PT/OT         Weight loss    Dietitian  House supplement  Frozen nutritional supplement  Mirtazapine 7.5 mg by mouth nightly                   HPI: Client continues to receive PT/OT services.  Staff report client continues to have a decreased appetite.  Will repeat labs and UA/culture.  Client denies headache or dizziness.  Denies chest pain or shortness of breath.  Room air.  She denies abdominal pain, nausea, vomiting, or diarrhea.  She states that her appetite is fair, however staff report it has decreased.  She denies dysuria.  She denies insomnia.  She has no current complaints of pain.    Note completed with Francisco J.  Please excuse any grammatical errors.    Review of Systems ROS as described in HPI     Physical Exam  Constitutional:       Comments: Sitting in  wheelchair   HENT:      Mouth/Throat:      Mouth: Mucous membranes are moist.     Cardiovascular:      Rate and Rhythm: Normal rate.   Pulmonary:      Effort: Pulmonary effort is normal.      Breath sounds: Normal breath sounds.      Comments: Room air  Abdominal:      General: Bowel sounds are normal.   Genitourinary:     Comments: Denies dysuria    Musculoskeletal:      Comments: PT/OT  Wheelchair transfers     Skin:     General: Skin is warm and dry.     Neurological:      Mental Status: She is alert. Mental status is at baseline.     Psychiatric:         Mood and Affect: Mood normal.      Comments: Cooperative with assessment  Follows direction  No current signs of heightened anxiety

## 2025-08-08 NOTE — ASSESSMENT & PLAN NOTE
Dietitian  House supplement  Frozen nutritional supplement  Mirtazapine 7.5 mg by mouth nightly

## 2025-08-08 NOTE — ASSESSMENT & PLAN NOTE
Recent Hospitalization  She received vitamin K  A total of 4 units PRBC transfused  1 unit of FFP transfused  Contact  GI-need EGD report  7/9/2025 hemoglobin 9.4  7/23/2025 hemoglobin 11.8  Pantoprazole 40 mg by mouth daily